# Patient Record
Sex: MALE | Race: WHITE | NOT HISPANIC OR LATINO | URBAN - METROPOLITAN AREA
[De-identification: names, ages, dates, MRNs, and addresses within clinical notes are randomized per-mention and may not be internally consistent; named-entity substitution may affect disease eponyms.]

---

## 2016-07-21 RX ORDER — GABAPENTIN 400 MG/1
3 CAPSULE ORAL
Qty: 0 | Refills: 0 | DISCHARGE
Start: 2016-07-21

## 2016-07-21 RX ORDER — POTASSIUM CHLORIDE 20 MEQ
1 PACKET (EA) ORAL
Qty: 0 | Refills: 0 | DISCHARGE
Start: 2016-07-21

## 2016-07-21 RX ORDER — ASPIRIN/CALCIUM CARB/MAGNESIUM 324 MG
1 TABLET ORAL
Qty: 0 | Refills: 0 | DISCHARGE
Start: 2016-07-21

## 2017-07-24 ENCOUNTER — EMERGENCY (EMERGENCY)
Facility: HOSPITAL | Age: 72
LOS: 1 days | Discharge: HOME | End: 2017-07-24
Attending: EMERGENCY MEDICINE
Payer: MEDICARE

## 2017-07-24 VITALS
TEMPERATURE: 98 F | DIASTOLIC BLOOD PRESSURE: 62 MMHG | SYSTOLIC BLOOD PRESSURE: 133 MMHG | HEART RATE: 42 BPM | OXYGEN SATURATION: 100 % | RESPIRATION RATE: 18 BRPM

## 2017-07-24 VITALS
HEART RATE: 48 BPM | TEMPERATURE: 98 F | HEIGHT: 70 IN | DIASTOLIC BLOOD PRESSURE: 50 MMHG | SYSTOLIC BLOOD PRESSURE: 97 MMHG | RESPIRATION RATE: 16 BRPM | WEIGHT: 184.97 LBS | OXYGEN SATURATION: 100 %

## 2017-07-24 DIAGNOSIS — Z98.89 OTHER SPECIFIED POSTPROCEDURAL STATES: Chronic | ICD-10-CM

## 2017-07-24 DIAGNOSIS — Z96.652 PRESENCE OF LEFT ARTIFICIAL KNEE JOINT: Chronic | ICD-10-CM

## 2017-07-24 DIAGNOSIS — Z95.1 PRESENCE OF AORTOCORONARY BYPASS GRAFT: Chronic | ICD-10-CM

## 2017-07-24 DIAGNOSIS — Z12.11 ENCOUNTER FOR SCREENING FOR MALIGNANT NEOPLASM OF COLON: Chronic | ICD-10-CM

## 2017-07-24 DIAGNOSIS — G56.00 CARPAL TUNNEL SYNDROME, UNSPECIFIED UPPER LIMB: Chronic | ICD-10-CM

## 2017-07-24 PROCEDURE — 71010: CPT | Mod: 26

## 2017-07-24 PROCEDURE — 70486 CT MAXILLOFACIAL W/O DYE: CPT

## 2017-07-24 PROCEDURE — 71045 X-RAY EXAM CHEST 1 VIEW: CPT

## 2017-07-24 PROCEDURE — 99284 EMERGENCY DEPT VISIT MOD MDM: CPT

## 2017-07-24 PROCEDURE — 72125 CT NECK SPINE W/O DYE: CPT | Mod: 26

## 2017-07-24 PROCEDURE — 70450 CT HEAD/BRAIN W/O DYE: CPT | Mod: 26

## 2017-07-24 PROCEDURE — 70450 CT HEAD/BRAIN W/O DYE: CPT

## 2017-07-24 PROCEDURE — 99284 EMERGENCY DEPT VISIT MOD MDM: CPT | Mod: 25

## 2017-07-24 PROCEDURE — 70486 CT MAXILLOFACIAL W/O DYE: CPT | Mod: 26

## 2017-07-24 PROCEDURE — 72125 CT NECK SPINE W/O DYE: CPT

## 2017-07-24 RX ORDER — ACETAMINOPHEN 500 MG
650 TABLET ORAL ONCE
Qty: 0 | Refills: 0 | Status: COMPLETED | OUTPATIENT
Start: 2017-07-24 | End: 2017-07-24

## 2017-07-24 RX ADMIN — Medication 650 MILLIGRAM(S): at 13:51

## 2017-07-24 NOTE — ED PROVIDER NOTE - MEDICAL DECISION MAKING DETAILS
Will give Tylenol for diffuse body soreness. Obtain CT head, cspine and mac face. Will give Tylenol for diffuse body soreness. Obtain CT head, c-spine and max face.

## 2017-07-24 NOTE — ED PROVIDER NOTE - SKIN WOUND TYPE
ABRASION TO THE R KNEE. HEALING WOUND TO B/L LEGS. ECCHYMOSIS TO B/L EXTREMITIES./ABRASION(S)/BRUSING

## 2017-07-24 NOTE — ED ADULT NURSE NOTE - OBJECTIVE STATEMENT
Late entry : Pt states he feel out of bed into a wood floor while reaching out for something , pt had a heard time getting upm and was crawling Late entry : Pt states he feel out of bed into a wood floor while reaching out for something , pt had a hard  time getting up and was crawling on the floor scamping his l side of the cheek , denies LOC " My whole body is soar"

## 2017-07-24 NOTE — ED PROVIDER NOTE - PSH
Carpal tunnel syndrome  B/L  S/P surgery  Encounter for screening colonoscopy    H/O umbilical hernia repair    History of appendectomy    History of knee replacement, total, left    History of tonsillectomy  appendectpmy  S/P CABG x 3    S/P laminectomy  lumbar  Status post arthroscopic knee surgery  left knee x2  Status post cardiac surgery  triple by-pass surgery, cardiac stents x3

## 2017-07-24 NOTE — ED PROVIDER NOTE - OBJECTIVE STATEMENT
71 y/o M pt with a PMHx of anemia, CAD, DM, HDL, and ventral tachycardia BIBA to the ED c/o lacerations/abrasions/bruising s/p fall today. Pt states that he fell onto the floor, out of bed and had trouble getting up. Pt called EMS himself. Her reports overall soreness.Pt sates he takes 1 baby Asprin daily. Denies LOC, n/v/d, dizziness, headache, chest pain, SOB, fever, chills, abdominal pain or any other complaints. NKDA. This patient is a 71 y/o man with a PMHx of anemia, CAD, DM, HDL, and ventral tachycardia BIBA to the ED c/o lacerations/abrasions/bruising s/p fall today. Pt states that he fell onto the floor, out of bed and had trouble getting up. Pt called EMS himself. He reports overall soreness to areas where he susptained abrasions.  Pt sates he takes 1 baby Asprin daily. Denies LOC, n/v/d, dizziness, headache, chest pain, SOB, and abdominal pain.

## 2017-07-24 NOTE — ED PROVIDER NOTE - PMH
Anemia    Back pain  s/p lumbar lami  CAD (coronary artery disease)  with stent placement  Carpal tunnel syndrome, bilateral  s/p surgery  Diabetes    DJD (degenerative joint disease)    High cholesterol    Reactive airway disease    Sleep apnea  Intolerant of CPAP  Umbilical hernia    Ventricular tachycardia

## 2017-12-06 ENCOUNTER — OUTPATIENT (OUTPATIENT)
Dept: OUTPATIENT SERVICES | Facility: HOSPITAL | Age: 72
LOS: 1 days | End: 2017-12-06
Payer: MEDICARE

## 2017-12-06 ENCOUNTER — APPOINTMENT (OUTPATIENT)
Dept: RADIOLOGY | Facility: CLINIC | Age: 72
End: 2017-12-06

## 2017-12-06 DIAGNOSIS — Z96.652 PRESENCE OF LEFT ARTIFICIAL KNEE JOINT: Chronic | ICD-10-CM

## 2017-12-06 DIAGNOSIS — Z98.89 OTHER SPECIFIED POSTPROCEDURAL STATES: Chronic | ICD-10-CM

## 2017-12-06 DIAGNOSIS — G56.00 CARPAL TUNNEL SYNDROME, UNSPECIFIED UPPER LIMB: Chronic | ICD-10-CM

## 2017-12-06 DIAGNOSIS — Z95.1 PRESENCE OF AORTOCORONARY BYPASS GRAFT: Chronic | ICD-10-CM

## 2017-12-06 DIAGNOSIS — Z00.8 ENCOUNTER FOR OTHER GENERAL EXAMINATION: ICD-10-CM

## 2017-12-06 DIAGNOSIS — Z12.11 ENCOUNTER FOR SCREENING FOR MALIGNANT NEOPLASM OF COLON: Chronic | ICD-10-CM

## 2017-12-06 PROCEDURE — 72110 X-RAY EXAM L-2 SPINE 4/>VWS: CPT

## 2017-12-06 PROCEDURE — 73502 X-RAY EXAM HIP UNI 2-3 VIEWS: CPT | Mod: 26,RT

## 2017-12-06 PROCEDURE — 72110 X-RAY EXAM L-2 SPINE 4/>VWS: CPT | Mod: 26

## 2017-12-06 PROCEDURE — 73502 X-RAY EXAM HIP UNI 2-3 VIEWS: CPT

## 2018-01-15 ENCOUNTER — INPATIENT (INPATIENT)
Facility: HOSPITAL | Age: 73
LOS: 3 days | Discharge: NURSING, REHAB OR LONG-TERM CARE | DRG: 552 | End: 2018-01-19
Attending: INTERNAL MEDICINE | Admitting: INTERNAL MEDICINE
Payer: MEDICARE

## 2018-01-15 VITALS
TEMPERATURE: 98 F | WEIGHT: 205.03 LBS | RESPIRATION RATE: 16 BRPM | DIASTOLIC BLOOD PRESSURE: 73 MMHG | OXYGEN SATURATION: 99 % | HEART RATE: 60 BPM | SYSTOLIC BLOOD PRESSURE: 138 MMHG | HEIGHT: 72 IN

## 2018-01-15 DIAGNOSIS — Z12.11 ENCOUNTER FOR SCREENING FOR MALIGNANT NEOPLASM OF COLON: Chronic | ICD-10-CM

## 2018-01-15 DIAGNOSIS — Z98.89 OTHER SPECIFIED POSTPROCEDURAL STATES: Chronic | ICD-10-CM

## 2018-01-15 DIAGNOSIS — Z95.1 PRESENCE OF AORTOCORONARY BYPASS GRAFT: Chronic | ICD-10-CM

## 2018-01-15 DIAGNOSIS — G56.00 CARPAL TUNNEL SYNDROME, UNSPECIFIED UPPER LIMB: Chronic | ICD-10-CM

## 2018-01-15 DIAGNOSIS — Z96.652 PRESENCE OF LEFT ARTIFICIAL KNEE JOINT: Chronic | ICD-10-CM

## 2018-01-15 LAB
ALBUMIN SERPL ELPH-MCNC: 4.2 G/DL — SIGNIFICANT CHANGE UP (ref 3.3–5.2)
ALP SERPL-CCNC: 91 U/L — SIGNIFICANT CHANGE UP (ref 40–120)
ALT FLD-CCNC: 21 U/L — SIGNIFICANT CHANGE UP
ANION GAP SERPL CALC-SCNC: 15 MMOL/L — SIGNIFICANT CHANGE UP (ref 5–17)
APPEARANCE UR: CLEAR — SIGNIFICANT CHANGE UP
AST SERPL-CCNC: 29 U/L — SIGNIFICANT CHANGE UP
BACTERIA # UR AUTO: ABNORMAL
BASOPHILS # BLD AUTO: 0 K/UL — SIGNIFICANT CHANGE UP (ref 0–0.2)
BASOPHILS NFR BLD AUTO: 0.3 % — SIGNIFICANT CHANGE UP (ref 0–2)
BILIRUB SERPL-MCNC: 0.9 MG/DL — SIGNIFICANT CHANGE UP (ref 0.4–2)
BILIRUB UR-MCNC: NEGATIVE — SIGNIFICANT CHANGE UP
BUN SERPL-MCNC: 31 MG/DL — HIGH (ref 8–20)
CALCIUM SERPL-MCNC: 9.4 MG/DL — SIGNIFICANT CHANGE UP (ref 8.6–10.2)
CHLORIDE SERPL-SCNC: 100 MMOL/L — SIGNIFICANT CHANGE UP (ref 98–107)
CO2 SERPL-SCNC: 23 MMOL/L — SIGNIFICANT CHANGE UP (ref 22–29)
COLOR SPEC: YELLOW — SIGNIFICANT CHANGE UP
CREAT SERPL-MCNC: 0.81 MG/DL — SIGNIFICANT CHANGE UP (ref 0.5–1.3)
DIFF PNL FLD: ABNORMAL
EOSINOPHIL # BLD AUTO: 0.1 K/UL — SIGNIFICANT CHANGE UP (ref 0–0.5)
EOSINOPHIL NFR BLD AUTO: 1.2 % — SIGNIFICANT CHANGE UP (ref 0–5)
EPI CELLS # UR: SIGNIFICANT CHANGE UP
GLUCOSE SERPL-MCNC: 110 MG/DL — SIGNIFICANT CHANGE UP (ref 70–115)
GLUCOSE UR QL: NEGATIVE MG/DL — SIGNIFICANT CHANGE UP
HCT VFR BLD CALC: 34.2 % — LOW (ref 42–52)
HGB BLD-MCNC: 11.5 G/DL — LOW (ref 14–18)
KETONES UR-MCNC: NEGATIVE — SIGNIFICANT CHANGE UP
LEUKOCYTE ESTERASE UR-ACNC: NEGATIVE — SIGNIFICANT CHANGE UP
LYMPHOCYTES # BLD AUTO: 1.2 K/UL — SIGNIFICANT CHANGE UP (ref 1–4.8)
LYMPHOCYTES # BLD AUTO: 17.7 % — LOW (ref 20–55)
MCHC RBC-ENTMCNC: 31 PG — SIGNIFICANT CHANGE UP (ref 27–31)
MCHC RBC-ENTMCNC: 33.6 G/DL — SIGNIFICANT CHANGE UP (ref 32–36)
MCV RBC AUTO: 92.2 FL — SIGNIFICANT CHANGE UP (ref 80–94)
MONOCYTES # BLD AUTO: 0.6 K/UL — SIGNIFICANT CHANGE UP (ref 0–0.8)
MONOCYTES NFR BLD AUTO: 9.5 % — SIGNIFICANT CHANGE UP (ref 3–10)
NEUTROPHILS # BLD AUTO: 4.8 K/UL — SIGNIFICANT CHANGE UP (ref 1.8–8)
NEUTROPHILS NFR BLD AUTO: 71.2 % — SIGNIFICANT CHANGE UP (ref 37–73)
NITRITE UR-MCNC: NEGATIVE — SIGNIFICANT CHANGE UP
PH UR: 7 — SIGNIFICANT CHANGE UP (ref 5–8)
PLATELET # BLD AUTO: 201 K/UL — SIGNIFICANT CHANGE UP (ref 150–400)
POTASSIUM SERPL-MCNC: 4.6 MMOL/L — SIGNIFICANT CHANGE UP (ref 3.5–5.3)
POTASSIUM SERPL-SCNC: 4.6 MMOL/L — SIGNIFICANT CHANGE UP (ref 3.5–5.3)
PROT SERPL-MCNC: 7.3 G/DL — SIGNIFICANT CHANGE UP (ref 6.6–8.7)
PROT UR-MCNC: 15 MG/DL
RBC # BLD: 3.71 M/UL — LOW (ref 4.6–6.2)
RBC # FLD: 13.3 % — SIGNIFICANT CHANGE UP (ref 11–15.6)
RBC CASTS # UR COMP ASSIST: ABNORMAL /HPF (ref 0–4)
SODIUM SERPL-SCNC: 138 MMOL/L — SIGNIFICANT CHANGE UP (ref 135–145)
SP GR SPEC: 1.01 — SIGNIFICANT CHANGE UP (ref 1.01–1.02)
UROBILINOGEN FLD QL: NEGATIVE MG/DL — SIGNIFICANT CHANGE UP
WBC # BLD: 6.7 K/UL — SIGNIFICANT CHANGE UP (ref 4.8–10.8)
WBC # FLD AUTO: 6.7 K/UL — SIGNIFICANT CHANGE UP (ref 4.8–10.8)
WBC UR QL: SIGNIFICANT CHANGE UP

## 2018-01-15 PROCEDURE — 93010 ELECTROCARDIOGRAM REPORT: CPT

## 2018-01-15 PROCEDURE — 99285 EMERGENCY DEPT VISIT HI MDM: CPT

## 2018-01-15 PROCEDURE — 72131 CT LUMBAR SPINE W/O DYE: CPT | Mod: 26

## 2018-01-15 PROCEDURE — 74177 CT ABD & PELVIS W/CONTRAST: CPT | Mod: 26

## 2018-01-15 RX ORDER — CYCLOBENZAPRINE HYDROCHLORIDE 10 MG/1
10 TABLET, FILM COATED ORAL ONCE
Qty: 0 | Refills: 0 | Status: COMPLETED | OUTPATIENT
Start: 2018-01-15 | End: 2018-01-15

## 2018-01-15 RX ORDER — KETOROLAC TROMETHAMINE 30 MG/ML
15 SYRINGE (ML) INJECTION ONCE
Qty: 0 | Refills: 0 | Status: DISCONTINUED | OUTPATIENT
Start: 2018-01-15 | End: 2018-01-15

## 2018-01-15 RX ADMIN — Medication 15 MILLIGRAM(S): at 08:51

## 2018-01-15 RX ADMIN — Medication 1 MILLIGRAM(S): at 21:00

## 2018-01-15 RX ADMIN — CYCLOBENZAPRINE HYDROCHLORIDE 10 MILLIGRAM(S): 10 TABLET, FILM COATED ORAL at 08:51

## 2018-01-15 RX ADMIN — Medication 50 MILLIGRAM(S): at 08:51

## 2018-01-15 NOTE — ED PROVIDER NOTE - CHPI ED SYMPTOMS NEG
no motor function loss/no bowel dysfunction/no bladder dysfunction/no constipation/no numbness/no tingling

## 2018-01-15 NOTE — ED PROVIDER NOTE - SHIFT CHANGE DETAILS
RECEIVED IN SIGN OUT  ELDERLY MALE LIVES ALONE, SEVERE SPINAL STENOSIS ON MEDS  CAN BARELY WALK  NEEDS PT EVAL, SW

## 2018-01-15 NOTE — ED ADULT TRIAGE NOTE - CHIEF COMPLAINT QUOTE
pt comes to ed reporting bilateral lower extremity decreased sensation; states unable to work this morning when he woke up. reports hx "partial paralysis" from spinal injury but worse today.

## 2018-01-15 NOTE — ED PROVIDER NOTE - NS ED ROS FT
Denies fever, chills, HA, dizziness, blurry vision, sore throat, coughing, SOB, CP, nausea, vomiting, abdominal pain, flank pain, diarrhea, constipation, blood in stool, urinary frequency/urgency/dysuria, hematuria, LE edema, or rashes.

## 2018-01-15 NOTE — ED PROVIDER NOTE - OBJECTIVE STATEMENT
Patient with PMH "partial paralysis" of b/l legs presents complaining of worsening low back and LE pain/weakness. He states that last night his legs felt like "100 lbs". He notes that two weeks ago he started taking a muscle relaxer Rx'd by his PMD for worsening back and leg pain, which is Rx'd "as needed", but he has been needing it daily. He otherwise has had no changes to his medications. He denies any fevers, any trauma, and fecal or urinary incontinence. He states he has otherwise been in his usual state of good health.    He is a poor historian otherwise.

## 2018-01-15 NOTE — ED ADULT NURSE NOTE - OBJECTIVE STATEMENT
Patient arrived to the ED from home, patient states that he has a hx of partial paralysis in his legs for the last few years from a "bad spine". Patient states that he has had a laminectomy in the past but still has difficulty with his legs. Patient states that last night he started to have increased weakness in his legs and states that he was unable to walk. Patient states that he is still having some weakness. He denies any pain. NAD noted. Patient denies any changes in bowel or bladder function

## 2018-01-15 NOTE — ED PROVIDER NOTE - MEDICAL DECISION MAKING DETAILS
Patient presents complaining of worsening bilateral LE pain and weakness. No weakness appreciated on exam, sensation at baseline. No signs of trauma. Patient does have some abdominal tenderness to palpation suprapubically. Will CT A/P and reconstruct L/S, treat with steroids, toradol and flexeril for back pain and reassess.

## 2018-01-15 NOTE — ED PROVIDER NOTE - LOCATION
back Full ROM of left hip, left knee, left ankle with 5/5 strength. Left TKR. Full ROM of the right hip, knee and ankle with 5/5 strength.

## 2018-01-15 NOTE — CHART NOTE - NSCHARTNOTEFT_GEN_A_CORE
AMILCAR Note - pt referred to SW for d/c planning. pt lives alone, utilizes first floor (has one step then landing then step into home - pt stays on first floor - laundry is in basement 12 steps down) pt has family local and caregiver Anahi. pt states he can not walk recently and the cane isn't helping - according to EMR pt has all home DME RW, [employer], Cane, Grab bars etc. MD ordered PT eval but it has not been completed today - pt to stay in ED for PT eval and safe d/c planning. pt has said he is agreeable to [facility] if that is recommendation.   pt was here at [facility] in Nov 2016 and did go to [facility] (previously was in [facility] and will not return there) SW to follow for safe planning after PT eval.

## 2018-01-15 NOTE — ED PROVIDER NOTE - CARE PLAN
Principal Discharge DX:	Chronic bilateral low back pain with bilateral sciatica  Secondary Diagnosis:	Constipation, unspecified constipation type Principal Discharge DX:	Inability to ambulate due to multiple joints  Secondary Diagnosis:	Constipation, unspecified constipation type  Secondary Diagnosis:	Functional disease present

## 2018-01-15 NOTE — ED PROVIDER NOTE - PROGRESS NOTE DETAILS
Chemistry not resulted. I called lab who notes that they have no power and that only one machine is working and there is a host communication error and no results are crossing over. The patient's chemistry is currently running. Multiple attempts by myself and nurse to get PT to see the patient prior to 18:00, without avail. I cannot send the patient home as he is unable to ambulate without assistance and he only uses a cane at home and lives alone. I contacted  [facility] for assistance. Patient signed out to Dr. Presley pending PT to see in the morning. PT IN NAD, HAVING BREAKFAST. COMFORTABLE

## 2018-01-15 NOTE — ED PROVIDER NOTE - PRINCIPAL DIAGNOSIS
Chronic bilateral low back pain with bilateral sciatica Inability to ambulate due to multiple joints

## 2018-01-16 DIAGNOSIS — M48.061 SPINAL STENOSIS, LUMBAR REGION WITHOUT NEUROGENIC CLAUDICATION: ICD-10-CM

## 2018-01-16 DIAGNOSIS — I25.10 ATHEROSCLEROTIC HEART DISEASE OF NATIVE CORONARY ARTERY WITHOUT ANGINA PECTORIS: ICD-10-CM

## 2018-01-16 DIAGNOSIS — R26.2 DIFFICULTY IN WALKING, NOT ELSEWHERE CLASSIFIED: ICD-10-CM

## 2018-01-16 DIAGNOSIS — E11.9 TYPE 2 DIABETES MELLITUS WITHOUT COMPLICATIONS: ICD-10-CM

## 2018-01-16 LAB
GLUCOSE BLDC GLUCOMTR-MCNC: 103 MG/DL — HIGH (ref 70–99)
GLUCOSE BLDC GLUCOMTR-MCNC: 164 MG/DL — HIGH (ref 70–99)

## 2018-01-16 PROCEDURE — 99223 1ST HOSP IP/OBS HIGH 75: CPT | Mod: AI

## 2018-01-16 RX ORDER — BUMETANIDE 0.25 MG/ML
1 INJECTION INTRAMUSCULAR; INTRAVENOUS DAILY
Qty: 0 | Refills: 0 | Status: DISCONTINUED | OUTPATIENT
Start: 2018-01-16 | End: 2018-01-19

## 2018-01-16 RX ORDER — ASPIRIN/CALCIUM CARB/MAGNESIUM 324 MG
81 TABLET ORAL DAILY
Qty: 0 | Refills: 0 | Status: DISCONTINUED | OUTPATIENT
Start: 2018-01-16 | End: 2018-01-19

## 2018-01-16 RX ORDER — OXYCODONE AND ACETAMINOPHEN 5; 325 MG/1; MG/1
1 TABLET ORAL EVERY 6 HOURS
Qty: 0 | Refills: 0 | Status: DISCONTINUED | OUTPATIENT
Start: 2018-01-16 | End: 2018-01-19

## 2018-01-16 RX ORDER — MORPHINE SULFATE 50 MG/1
2 CAPSULE, EXTENDED RELEASE ORAL EVERY 6 HOURS
Qty: 0 | Refills: 0 | Status: DISCONTINUED | OUTPATIENT
Start: 2018-01-16 | End: 2018-01-19

## 2018-01-16 RX ORDER — SODIUM CHLORIDE 9 MG/ML
1000 INJECTION, SOLUTION INTRAVENOUS
Qty: 0 | Refills: 0 | Status: DISCONTINUED | OUTPATIENT
Start: 2018-01-16 | End: 2018-01-19

## 2018-01-16 RX ORDER — DEXTROSE 50 % IN WATER 50 %
1 SYRINGE (ML) INTRAVENOUS ONCE
Qty: 0 | Refills: 0 | Status: DISCONTINUED | OUTPATIENT
Start: 2018-01-16 | End: 2018-01-19

## 2018-01-16 RX ORDER — ATORVASTATIN CALCIUM 80 MG/1
40 TABLET, FILM COATED ORAL AT BEDTIME
Qty: 0 | Refills: 0 | Status: DISCONTINUED | OUTPATIENT
Start: 2018-01-16 | End: 2018-01-19

## 2018-01-16 RX ORDER — ENOXAPARIN SODIUM 100 MG/ML
40 INJECTION SUBCUTANEOUS EVERY 24 HOURS
Qty: 0 | Refills: 0 | Status: DISCONTINUED | OUTPATIENT
Start: 2018-01-16 | End: 2018-01-19

## 2018-01-16 RX ORDER — DEXTROSE 50 % IN WATER 50 %
12.5 SYRINGE (ML) INTRAVENOUS ONCE
Qty: 0 | Refills: 0 | Status: DISCONTINUED | OUTPATIENT
Start: 2018-01-16 | End: 2018-01-19

## 2018-01-16 RX ORDER — GABAPENTIN 400 MG/1
300 CAPSULE ORAL AT BEDTIME
Qty: 0 | Refills: 0 | Status: DISCONTINUED | OUTPATIENT
Start: 2018-01-16 | End: 2018-01-19

## 2018-01-16 RX ORDER — DEXTROSE 50 % IN WATER 50 %
25 SYRINGE (ML) INTRAVENOUS ONCE
Qty: 0 | Refills: 0 | Status: DISCONTINUED | OUTPATIENT
Start: 2018-01-16 | End: 2018-01-19

## 2018-01-16 RX ORDER — GLUCAGON INJECTION, SOLUTION 0.5 MG/.1ML
1 INJECTION, SOLUTION SUBCUTANEOUS ONCE
Qty: 0 | Refills: 0 | Status: DISCONTINUED | OUTPATIENT
Start: 2018-01-16 | End: 2018-01-19

## 2018-01-16 RX ORDER — INSULIN LISPRO 100/ML
VIAL (ML) SUBCUTANEOUS
Qty: 0 | Refills: 0 | Status: DISCONTINUED | OUTPATIENT
Start: 2018-01-16 | End: 2018-01-19

## 2018-01-16 RX ORDER — MAGNESIUM OXIDE 400 MG ORAL TABLET 241.3 MG
400 TABLET ORAL DAILY
Qty: 0 | Refills: 0 | Status: DISCONTINUED | OUTPATIENT
Start: 2018-01-16 | End: 2018-01-19

## 2018-01-16 RX ORDER — MOMETASONE FUROATE 220 UG/1
1 INHALANT RESPIRATORY (INHALATION)
Qty: 0 | Refills: 0 | Status: DISCONTINUED | OUTPATIENT
Start: 2018-01-16 | End: 2018-01-19

## 2018-01-16 RX ORDER — GABAPENTIN 400 MG/1
300 CAPSULE ORAL THREE TIMES A DAY
Qty: 0 | Refills: 0 | Status: DISCONTINUED | OUTPATIENT
Start: 2018-01-16 | End: 2018-01-19

## 2018-01-16 RX ORDER — PANTOPRAZOLE SODIUM 20 MG/1
40 TABLET, DELAYED RELEASE ORAL
Qty: 0 | Refills: 0 | Status: DISCONTINUED | OUTPATIENT
Start: 2018-01-16 | End: 2018-01-19

## 2018-01-16 RX ORDER — ATENOLOL 25 MG/1
50 TABLET ORAL DAILY
Qty: 0 | Refills: 0 | Status: DISCONTINUED | OUTPATIENT
Start: 2018-01-16 | End: 2018-01-19

## 2018-01-16 RX ORDER — POTASSIUM CHLORIDE 20 MEQ
10 PACKET (EA) ORAL DAILY
Qty: 0 | Refills: 0 | Status: DISCONTINUED | OUTPATIENT
Start: 2018-01-16 | End: 2018-01-19

## 2018-01-16 RX ADMIN — ATORVASTATIN CALCIUM 40 MILLIGRAM(S): 80 TABLET, FILM COATED ORAL at 21:10

## 2018-01-16 RX ADMIN — OXYCODONE AND ACETAMINOPHEN 1 TABLET(S): 5; 325 TABLET ORAL at 17:37

## 2018-01-16 RX ADMIN — GABAPENTIN 300 MILLIGRAM(S): 400 CAPSULE ORAL at 21:11

## 2018-01-16 RX ADMIN — Medication 2: at 17:22

## 2018-01-16 RX ADMIN — OXYCODONE AND ACETAMINOPHEN 1 TABLET(S): 5; 325 TABLET ORAL at 17:22

## 2018-01-16 RX ADMIN — MOMETASONE FUROATE 1 PUFF(S): 220 INHALANT RESPIRATORY (INHALATION) at 20:00

## 2018-01-16 NOTE — PHYSICAL THERAPY INITIAL EVALUATION ADULT - ADDITIONAL COMMENTS
Pt lives alone in a 2 story house with 3 steps to enter on the 1st floor.  Reports amb with SAC and Modified Independent with ADLs and self care.  Has a HHA 3 x per week for cooking, shopping and laundry

## 2018-01-16 NOTE — H&P ADULT - HISTORY OF PRESENT ILLNESS
71 yo M w/ hx CABG/ PCI, DM2, Lumbar laminectomy with resulting partial paralysis from waist down, Cervical spine surgery presents with progressive weakness in LE along with back pain and unsteady gait with cane.    no new saddle paresthesia, urinary/bowel incontinence or LE weakness.  all findings at baseline per patient.    in ER, seen by physical therapy and Subacute rehab advised with patient agreeable as unsafe discharge.

## 2018-01-16 NOTE — CHART NOTE - NSCHARTNOTEFT_GEN_A_CORE
SOCIAL WORK NOTE:  PATIENT HELD OVERNIGHT FOR PT AND SW IN THE [facility]. CONSULTED WITH PT AND PATIENT IS IN DEFINATE NEED OF REHAB.  MET WITH PATIENT WHOM IS ALERT AND ORIENTED. PLEASANT AND COOPERATIVE.  HAD A LONG CONVERSATION WITH PATIENT REGARDING HIS OPTIONS FOR  DC PLANNING.  PATIENT DOES NOT REALLY HAVE ANY FAMILY THAT CAN ASSIST AND WOULD LIKE [facility].  MADE PATIENT AWARE OF THE 3 NIGHT STAY RULE AND THAT HE WOULDN'T BE PREPARED TO LEAVE UNTIL FRIDAY MORNING INTO A [facility]. ALSO MADE THE PATIENT AWARE THAT HIS 3 NIGHT STAY IS A NON-QUALIFYING STAY AND MAY B E AT RISK TO GETTING A BILL FOR THE [facility] DOWN THE LINE.  PATIENT EXPRESSED UNDERSTANDING AND IS VERY AFRAID TO AMBULATE WITH CANE AT THIS TIME AND WILL TAKE [facility].  PREFERENCE IS FOR [facility] ([facility]). DR MARQUEZ AWARE AND CCC AS WELL.  WILL COMPLETE PSYCHOSOCIAL ONCE PATINT ADMITTED

## 2018-01-16 NOTE — H&P ADULT - ASSESSMENT
71 yo M w/ hx CABG/ PCI, DM2, Lumbar laminectomy with resulting partial paralysis from waist down, Cervical spine surgery presents with progressive weakness in LE along with back pain and unsteady gait with cane.

## 2018-01-16 NOTE — H&P ADULT - FAMILY HISTORY
Family history of early CAD     Family history of CHF (congestive heart failure)     Family history of dementia     Family history of prostate cancer     Sibling  Still living? Yes, Estimated age: Age Unknown  Family history of diabetes mellitus, Age at diagnosis: Age Unknown

## 2018-01-16 NOTE — PHYSICAL THERAPY INITIAL EVALUATION ADULT - GENERAL OBSERVATIONS, REHAB EVAL
Pt found lying in bed with c/o bilat LE weakness and numbness, pleasant and engaging and agreeable to PT

## 2018-01-16 NOTE — H&P ADULT - EXTREMITIES COMMENTS
chronic LE venous insufficiency skin changes  small ulceration right heel without surrounding erythema or fluctuance noted

## 2018-01-16 NOTE — ED ADULT NURSE REASSESSMENT NOTE - NS ED NURSE REASSESS COMMENT FT1
aware   pt to be admitted for 3 days stay...Dr Bangura aware
6 tower to call right back for report
PT at bedside  pt states ambulates at home with cane/rw   breakfast given
called 6 tower to give report.  pt with  ?ulcer looking small circular area noted to bottom left heel  'its been there awhile'  'it doesn't really hurt'  no drainage noted
report given to Enedina DEAN     bandaids intact to BUE  'please leave them there'  'I need them just like they are'   gauze applied with kait to left foot.  waiting transport
Contact Info: Pina Kwong (caregiver) [phone]. Please call her with any information updates.
Patient continues to rest in bed at this time, patient is waiting to be seen by PT. Several attempts made to contact PT, but no answer.
Patient continues to rest in bed at this time, patient taken to CT. NAD noted. Updated on progress.
Pt is resting in bed comfortably at this time, no apparent distress noted at this time. pt safety maintained. Pt denies any complaints at this time. Plan of care explained, will continue to monitor.
assumed care of pt   pt resting comfortably on stretcher,  multiple bandaids to BUE  'I have a lot of skin tears/issues"      IVSL 20g RFA +flush +blood return site clean/dry    [place]=strength    waiting for PT/SW
at 2100, pt had an episode of shortness of breath and was shaking his arms saying "he is having convulsions." MD Presley called to bedside to evaluate pt. Pt was placed on 2L Nc. RN instructed deep breathing exercises. stat ekg was performed at this time and pt was medicated as per orders. pt states he has h/o anxiety. pt states he feels better at this time. will continue to monitor.
pt care assumed at 1930, no apparent distress noted at this time, charting as noted. pt received Alert and Oriented to person, place, situation and time laying in bed comfortably. pt c/o of the noisy environment. door was closed at this time and ear plugs were provided. pt is awaiting PT in the morning. plan of care explained, will continue to monitor.

## 2018-01-16 NOTE — PROVIDER CONTACT NOTE (OTHER) - ASSESSMENT
Pt with no c/o pain before, during or after RX.  Pt will benefit from PT to maximize functional independence.  Will continue to follow.  Pt left supine in bed in no apparent distress and call bell within reach. Nurse aware.

## 2018-01-17 LAB
GLUCOSE BLDC GLUCOMTR-MCNC: 116 MG/DL — HIGH (ref 70–99)
GLUCOSE BLDC GLUCOMTR-MCNC: 154 MG/DL — HIGH (ref 70–99)
GLUCOSE BLDC GLUCOMTR-MCNC: 168 MG/DL — HIGH (ref 70–99)
GLUCOSE BLDC GLUCOMTR-MCNC: 86 MG/DL — SIGNIFICANT CHANGE UP (ref 70–99)
HBA1C BLD-MCNC: 6.2 % — HIGH (ref 4–5.6)

## 2018-01-17 PROCEDURE — 99233 SBSQ HOSP IP/OBS HIGH 50: CPT

## 2018-01-17 RX ORDER — LANOLIN ALCOHOL/MO/W.PET/CERES
5 CREAM (GRAM) TOPICAL ONCE
Qty: 0 | Refills: 0 | Status: COMPLETED | OUTPATIENT
Start: 2018-01-17 | End: 2018-01-17

## 2018-01-17 RX ADMIN — Medication 2: at 11:49

## 2018-01-17 RX ADMIN — PANTOPRAZOLE SODIUM 40 MILLIGRAM(S): 20 TABLET, DELAYED RELEASE ORAL at 05:22

## 2018-01-17 RX ADMIN — ATORVASTATIN CALCIUM 40 MILLIGRAM(S): 80 TABLET, FILM COATED ORAL at 21:33

## 2018-01-17 RX ADMIN — Medication 10 MILLIEQUIVALENT(S): at 11:50

## 2018-01-17 RX ADMIN — BUMETANIDE 1 MILLIGRAM(S): 0.25 INJECTION INTRAMUSCULAR; INTRAVENOUS at 05:21

## 2018-01-17 RX ADMIN — ENOXAPARIN SODIUM 40 MILLIGRAM(S): 100 INJECTION SUBCUTANEOUS at 05:22

## 2018-01-17 RX ADMIN — OXYCODONE AND ACETAMINOPHEN 1 TABLET(S): 5; 325 TABLET ORAL at 00:48

## 2018-01-17 RX ADMIN — MOMETASONE FUROATE 1 PUFF(S): 220 INHALANT RESPIRATORY (INHALATION) at 05:21

## 2018-01-17 RX ADMIN — GABAPENTIN 300 MILLIGRAM(S): 400 CAPSULE ORAL at 21:34

## 2018-01-17 RX ADMIN — MAGNESIUM OXIDE 400 MG ORAL TABLET 400 MILLIGRAM(S): 241.3 TABLET ORAL at 11:50

## 2018-01-17 RX ADMIN — OXYCODONE AND ACETAMINOPHEN 1 TABLET(S): 5; 325 TABLET ORAL at 22:30

## 2018-01-17 RX ADMIN — ATENOLOL 50 MILLIGRAM(S): 25 TABLET ORAL at 05:21

## 2018-01-17 RX ADMIN — OXYCODONE AND ACETAMINOPHEN 1 TABLET(S): 5; 325 TABLET ORAL at 01:22

## 2018-01-17 RX ADMIN — OXYCODONE AND ACETAMINOPHEN 1 TABLET(S): 5; 325 TABLET ORAL at 21:32

## 2018-01-17 RX ADMIN — Medication 81 MILLIGRAM(S): at 11:50

## 2018-01-17 RX ADMIN — Medication 2: at 17:05

## 2018-01-17 NOTE — PROGRESS NOTE ADULT - SUBJECTIVE AND OBJECTIVE BOX
seen for weakness    chronic LE weakness   pain ok  ros negative otherwise     MEDICATIONS  (STANDING):  aspirin  chewable 81 milliGRAM(s) Oral daily  ATENolol  Tablet 50 milliGRAM(s) Oral daily  atorvastatin 40 milliGRAM(s) Oral at bedtime  buMETAnide 1 milliGRAM(s) Oral daily  dextrose 5%. 1000 milliLiter(s) (50 mL/Hr) IV Continuous <Continuous>  dextrose 50% Injectable 12.5 Gram(s) IV Push once  dextrose 50% Injectable 25 Gram(s) IV Push once  dextrose 50% Injectable 25 Gram(s) IV Push once  enoxaparin Injectable 40 milliGRAM(s) SubCutaneous every 24 hours  gabapentin 300 milliGRAM(s) Oral at bedtime  insulin lispro (HumaLOG) corrective regimen sliding scale   SubCutaneous three times a day before meals  magnesium oxide 400 milliGRAM(s) Oral daily  mometasone 220 MICROgram(s) Inhaler 1 Puff(s) Inhalation two times a day  pantoprazole    Tablet 40 milliGRAM(s) Oral before breakfast  potassium chloride    Tablet ER 10 milliEquivalent(s) Oral daily    MEDICATIONS  (PRN):  dextrose Gel 1 Dose(s) Oral once PRN Blood Glucose LESS THAN 70 milliGRAM(s)/deciliter  gabapentin 300 milliGRAM(s) Oral three times a day PRN moderate pain  glucagon  Injectable 1 milliGRAM(s) IntraMuscular once PRN Glucose LESS THAN 70 milligrams/deciliter  morphine  - Injectable 2 milliGRAM(s) IV Push every 6 hours PRN breakthrough pain  oxyCODONE    5 mG/acetaminophen 325 mG 1 Tablet(s) Oral every 6 hours PRN Severe Pain (7 - 10)      Allergies    penicillin (Other)    Vital Signs Last 24 Hrs  T(C): 36.6 (17 Jan 2018 10:44), Max: 36.8 (17 Jan 2018 00:10)  T(F): 97.8 (17 Jan 2018 10:44), Max: 98.2 (17 Jan 2018 00:10)  HR: 78 (17 Jan 2018 10:44) (67 - 82)  BP: 109/63 (17 Jan 2018 10:44) (108/50 - 136/67)  BP(mean): --  RR: 18 (17 Jan 2018 10:44) (18 - 19)  SpO2: 98% (17 Jan 2018 10:44) (98% - 98%)    PHYSICAL EXAM:    GENERAL: NAD  CHEST/LUNG: Clear to percussion bilaterally  HEART: Regular rate and rhythm; S1 S2  ABDOMEN: Soft, Nontender, Nondistended; Bowel sounds present  EXTREMITIES: no edema.  NERVOUS SYSTEM:  Alert & Oriented X3, gen weakness, non focal   LABS:                CAPILLARY BLOOD GLUCOSE      POCT Blood Glucose.: 154 mg/dL (17 Jan 2018 11:44)  POCT Blood Glucose.: 86 mg/dL (17 Jan 2018 08:43)  POCT Blood Glucose.: 103 mg/dL (16 Jan 2018 21:10)  POCT Blood Glucose.: 164 mg/dL (16 Jan 2018 17:08)        RADIOLOGY & ADDITIONAL TESTS:

## 2018-01-18 LAB
ANION GAP SERPL CALC-SCNC: 14 MMOL/L — SIGNIFICANT CHANGE UP (ref 5–17)
BUN SERPL-MCNC: 50 MG/DL — HIGH (ref 8–20)
CALCIUM SERPL-MCNC: 8.9 MG/DL — SIGNIFICANT CHANGE UP (ref 8.6–10.2)
CHLORIDE SERPL-SCNC: 96 MMOL/L — LOW (ref 98–107)
CO2 SERPL-SCNC: 28 MMOL/L — SIGNIFICANT CHANGE UP (ref 22–29)
CREAT SERPL-MCNC: 1.33 MG/DL — HIGH (ref 0.5–1.3)
CRP SERPL-MCNC: <0.4 MG/DL — SIGNIFICANT CHANGE UP (ref 0–0.4)
ERYTHROCYTE [SEDIMENTATION RATE] IN BLOOD: 46 MM/HR — HIGH (ref 0–20)
GLUCOSE BLDC GLUCOMTR-MCNC: 105 MG/DL — HIGH (ref 70–99)
GLUCOSE BLDC GLUCOMTR-MCNC: 112 MG/DL — HIGH (ref 70–99)
GLUCOSE BLDC GLUCOMTR-MCNC: 113 MG/DL — HIGH (ref 70–99)
GLUCOSE BLDC GLUCOMTR-MCNC: 195 MG/DL — HIGH (ref 70–99)
GLUCOSE SERPL-MCNC: 182 MG/DL — HIGH (ref 70–115)
HCT VFR BLD CALC: 34.3 % — LOW (ref 42–52)
HGB BLD-MCNC: 11.6 G/DL — LOW (ref 14–18)
LACTATE BLDV-MCNC: 1.3 MMOL/L — SIGNIFICANT CHANGE UP (ref 0.5–2)
MCHC RBC-ENTMCNC: 31.2 PG — HIGH (ref 27–31)
MCHC RBC-ENTMCNC: 33.8 G/DL — SIGNIFICANT CHANGE UP (ref 32–36)
MCV RBC AUTO: 92.2 FL — SIGNIFICANT CHANGE UP (ref 80–94)
PLATELET # BLD AUTO: 207 K/UL — SIGNIFICANT CHANGE UP (ref 150–400)
POTASSIUM SERPL-MCNC: 4.6 MMOL/L — SIGNIFICANT CHANGE UP (ref 3.5–5.3)
POTASSIUM SERPL-SCNC: 4.6 MMOL/L — SIGNIFICANT CHANGE UP (ref 3.5–5.3)
RBC # BLD: 3.72 M/UL — LOW (ref 4.6–6.2)
RBC # FLD: 13.4 % — SIGNIFICANT CHANGE UP (ref 11–15.6)
SODIUM SERPL-SCNC: 138 MMOL/L — SIGNIFICANT CHANGE UP (ref 135–145)
WBC # BLD: 6.1 K/UL — SIGNIFICANT CHANGE UP (ref 4.8–10.8)
WBC # FLD AUTO: 6.1 K/UL — SIGNIFICANT CHANGE UP (ref 4.8–10.8)

## 2018-01-18 PROCEDURE — 99223 1ST HOSP IP/OBS HIGH 75: CPT

## 2018-01-18 PROCEDURE — 99233 SBSQ HOSP IP/OBS HIGH 50: CPT

## 2018-01-18 PROCEDURE — 72148 MRI LUMBAR SPINE W/O DYE: CPT | Mod: 26

## 2018-01-18 PROCEDURE — 72146 MRI CHEST SPINE W/O DYE: CPT | Mod: 26,59

## 2018-01-18 PROCEDURE — 72141 MRI NECK SPINE W/O DYE: CPT | Mod: 26

## 2018-01-18 PROCEDURE — 72147 MRI CHEST SPINE W/DYE: CPT | Mod: 26

## 2018-01-18 RX ORDER — SODIUM CHLORIDE 9 MG/ML
1000 INJECTION INTRAMUSCULAR; INTRAVENOUS; SUBCUTANEOUS
Qty: 0 | Refills: 0 | Status: DISCONTINUED | OUTPATIENT
Start: 2018-01-18 | End: 2018-01-19

## 2018-01-18 RX ORDER — ALPRAZOLAM 0.25 MG
0.25 TABLET ORAL THREE TIMES A DAY
Qty: 0 | Refills: 0 | Status: DISCONTINUED | OUTPATIENT
Start: 2018-01-18 | End: 2018-01-19

## 2018-01-18 RX ADMIN — ATORVASTATIN CALCIUM 40 MILLIGRAM(S): 80 TABLET, FILM COATED ORAL at 21:38

## 2018-01-18 RX ADMIN — ENOXAPARIN SODIUM 40 MILLIGRAM(S): 100 INJECTION SUBCUTANEOUS at 05:58

## 2018-01-18 RX ADMIN — Medication 0.25 MILLIGRAM(S): at 17:16

## 2018-01-18 RX ADMIN — MAGNESIUM OXIDE 400 MG ORAL TABLET 400 MILLIGRAM(S): 241.3 TABLET ORAL at 13:14

## 2018-01-18 RX ADMIN — Medication 10 MILLIEQUIVALENT(S): at 13:14

## 2018-01-18 RX ADMIN — PANTOPRAZOLE SODIUM 40 MILLIGRAM(S): 20 TABLET, DELAYED RELEASE ORAL at 05:57

## 2018-01-18 RX ADMIN — MOMETASONE FUROATE 1 PUFF(S): 220 INHALANT RESPIRATORY (INHALATION) at 09:16

## 2018-01-18 RX ADMIN — Medication 2: at 13:14

## 2018-01-18 RX ADMIN — OXYCODONE AND ACETAMINOPHEN 1 TABLET(S): 5; 325 TABLET ORAL at 11:35

## 2018-01-18 RX ADMIN — BUMETANIDE 1 MILLIGRAM(S): 0.25 INJECTION INTRAMUSCULAR; INTRAVENOUS at 05:57

## 2018-01-18 RX ADMIN — OXYCODONE AND ACETAMINOPHEN 1 TABLET(S): 5; 325 TABLET ORAL at 11:28

## 2018-01-18 RX ADMIN — Medication 5 MILLIGRAM(S): at 00:11

## 2018-01-18 RX ADMIN — MOMETASONE FUROATE 1 PUFF(S): 220 INHALANT RESPIRATORY (INHALATION) at 21:38

## 2018-01-18 RX ADMIN — Medication 81 MILLIGRAM(S): at 13:14

## 2018-01-18 RX ADMIN — ATENOLOL 50 MILLIGRAM(S): 25 TABLET ORAL at 05:57

## 2018-01-18 RX ADMIN — GABAPENTIN 300 MILLIGRAM(S): 400 CAPSULE ORAL at 21:38

## 2018-01-18 RX ADMIN — SODIUM CHLORIDE 75 MILLILITER(S): 9 INJECTION INTRAMUSCULAR; INTRAVENOUS; SUBCUTANEOUS at 23:00

## 2018-01-18 NOTE — DIETITIAN INITIAL EVALUATION ADULT. - OTHER INFO
Pt lost 150 lbs 10 years ago, but recently gained back ~50 lbs. Pt states his cardiologist wants him at 170lbs.

## 2018-01-18 NOTE — PROGRESS NOTE ADULT - SUBJECTIVE AND OBJECTIVE BOX
Pt Name: ALESSIA IBARRA    MRN: [id number]    71 yo M admitted to medicine 1/16/18 w/ hx CABG/ PCI, DM2, Lumbar laminectomy with resulting partial paralysis from waist down, Cervical spine surgery presents with progressive weakness in LE along with back pain and unsteady gait with cane. Denies any back pain ant this time. States his legs feel heavy   Denies new saddle paresthesia, urinary/bowel incontinence or LE weakness.      HEALTH ISSUES - PROBLEM Dx:  Coronary artery disease involving native coronary artery of native heart without angina pectoris: Coronary artery disease involving native coronary artery of native heart without angina pectoris  Type 2 diabetes mellitus without complication, without long-term current use of insulin: Type 2 diabetes mellitus without complication, without long-term current use of insulin  Spinal stenosis of lumbar region with radiculopathy: Spinal stenosis of lumbar region with radiculopathy    REVIEW OF SYSTEMS    General: alert, responsive, in NAD	    Skin/Breast: no rashes, no pruritis  	  Ophthalmologic: No visual changes. No redness  	  ENMT: no discharge, no swelling    Respiratory and Thorax: no difficulty breathing. no cough  	  Cardiovascular:	no chest pain, no palpitations    Gastrointestinal:	no abdominal pain, no diarrhea    Genitourinary: no dysuria, no bleeding    Musculoskeletal:	 see hpi    Neurological: no sensory or motor changes    Psychiatric: no anxiety or depression	    Hematology/Lymphatics:	 no swelling    Endocrine: no Hx of diabetes    ROS is otherwise negative.    PAST MEDICAL & SURGICAL HISTORY:  Anemia  Ventricular tachycardia  Reactive airway disease  Back pain: s/p lumbar lami  Sleep apnea: Intolerant of CPAP  Carpal tunnel syndrome, bilateral: s/p surgery  DJD (degenerative joint disease)  Umbilical hernia  High cholesterol  CAD (coronary artery disease): with stent placement  Diabetes  History of knee replacement, total, left  S/P CABG x 3  H/O umbilical hernia repair  Carpal tunnel syndrome: B/L  S/P surgery  Status post cardiac surgery: triple by-pass surgery, cardiac stents x3  S/P laminectomy: lumbar  Status post arthroscopic knee surgery: left knee x2  History of appendectomy  History of tonsillectomy: appendectpmy  Encounter for screening colonoscopy    Allergies: penicillin (Other)      Medications: aspirin  chewable 81 milliGRAM(s) Oral daily  ATENolol  Tablet 50 milliGRAM(s) Oral daily  atorvastatin 40 milliGRAM(s) Oral at bedtime  buMETAnide 1 milliGRAM(s) Oral daily  dextrose 5%. 1000 milliLiter(s) IV Continuous <Continuous>  dextrose 50% Injectable 12.5 Gram(s) IV Push once  dextrose 50% Injectable 25 Gram(s) IV Push once  dextrose 50% Injectable 25 Gram(s) IV Push once  dextrose Gel 1 Dose(s) Oral once PRN  enoxaparin Injectable 40 milliGRAM(s) SubCutaneous every 24 hours  gabapentin 300 milliGRAM(s) Oral at bedtime  gabapentin 300 milliGRAM(s) Oral three times a day PRN  glucagon  Injectable 1 milliGRAM(s) IntraMuscular once PRN  insulin lispro (HumaLOG) corrective regimen sliding scale   SubCutaneous three times a day before meals  magnesium oxide 400 milliGRAM(s) Oral daily  mometasone 220 MICROgram(s) Inhaler 1 Puff(s) Inhalation two times a day  morphine  - Injectable 2 milliGRAM(s) IV Push every 6 hours PRN  oxyCODONE    5 mG/acetaminophen 325 mG 1 Tablet(s) Oral every 6 hours PRN  pantoprazole    Tablet 40 milliGRAM(s) Oral before breakfast  potassium chloride    Tablet ER 10 milliEquivalent(s) Oral daily      FAMILY HISTORY:  Family history of diabetes mellitus (Sibling)  Family history of prostate cancer  Family history of dementia  Family history of CHF (congestive heart failure)  Family history of early CAD  : non-contributory    Social History:     Ambulation: Walking independently [ ] With Cane [ ] With Walker [ ]  Bedbound [ ]     PHYSICAL EXAM:    Vital Signs Last 24 Hrs  T(C): 36.6 (18 Jan 2018 07:20), Max: 36.9 (17 Jan 2018 23:33)  T(F): 97.8 (18 Jan 2018 07:20), Max: 98.5 (17 Jan 2018 23:33)  HR: 76 (18 Jan 2018 07:20) (63 - 78)  BP: 120/61 (18 Jan 2018 07:20) (107/53 - 120/61)  BP(mean): --  RR: 18 (18 Jan 2018 07:20) (18 - 19)  SpO2: 99% (18 Jan 2018 07:20) (98% - 100%)  Daily     Daily     Appearance: Alert, responsive, in no acute distress.    Skin: no rash on visible skin. Skin is clean, dry and intact. No bleeding. No abrasions. No ulcerations.    Vascular: 2+ distal pulses. Cap refill < 2 sec. No signs of venous insuffiency or stasis. No extremity ulcerations. No cyanosis.    Musculoskeletal:         Left Upper Extremity: Atraumatic with normal alignment NROM. No crepitus. No bony tenderness.        Right Upper Extremity: Atraumatic with normal alignment NROM. No crepitus. No bony tenderness.        Left Lower Extremity: Atraumatic with normal alignment NROM. No crepitus. No bony tenderness.        Right Lower Extremity: Atraumatic with normal alignment NROM. No crepitus. No bony tenderness.     Neurological: Sensation diminished to light touch in all extremities - patient's baseline.     Pathologic reflexes: [ ] Preciado,  [ ]  Clonus, Babinski, Cremasteric, Rectal tone            Reflexes:   Biceps, Brachioradialis, Patella, Achilles intact            Motor exam:          Upper extremity              Bi(C5)  WE(C6)  EE(C7)   FF(C8)                                                R         5/5        5/5        5/5       5/5                                               L          5/5        5/5        5/5       5/5         Lower extremity          HF(L2)   KE(L3)    TA(L4)   EHL(L5)  GS(S1)                                                 R        5/5        5/5        5/5       5/5         5/5                                               L         5/5        5/5       5/5       5/5          5/5    Imaging Studies:     EXAM:  CT REFORM SPINE L                          PROCEDURE DATE:  01/15/2018      INTERPRETATION:  TECHNIQUE: CT examination of the lumbar spine was   performed in continuous axial increments from L1 through the superior   portion of the sacrum. The examination was performed without intravenous   contrast material. The exam was filmed at bone windows and soft tissue   windows. Post processing sagittal and coronal reformatted imaging was   obtained.     HISTORY: Lower back pain.    FINDINGS there is a moderate to severe multifactorial central canal   stenosis@the L4-L5 disc level with a 3 mm anterolisthesis of L4 on L5   secondary to a bilateral facet arthrosis. There is narrowing of the   lateral recess@L5 vertebra resulting in narrowing of the bilateral L5   nerve roots. The left and right L4-L5 foramens are narrowed by superior   displacement of the L5 superior facet.    The L5-S1, L3-4, L2-3, L1-2 and T12-L1 disc levels show no herniated disc   central canal stenosis or foraminal stenosis. No fracture seen. As noted   there is a facet arthropathy involving the L4-S1 facet joints   bilaterally. The  Posterior spinous processes intact. The  Paraspinal soft tissues and the retroperitoneal vascular structures aside   from calcified plaques of abdominal aorta and iliac arteries intact. SI   joints intact.     IMPRESSION:  moderate multifactorial central canal stenosis @the L4-5 disc   level with bilateral L4-5 foraminal stenosis as described.  	  A/P:  Patient is a 72y old  Male who presents with a chief complaint of weakness, back pain (16 Jan 2018 13:42)    PLAN:  * Pain control  * MRI ordered  * Treatment plan to be finalized after review of pending imaging studies

## 2018-01-18 NOTE — PROGRESS NOTE ADULT - SUBJECTIVE AND OBJECTIVE BOX
seen for weakness    complaining of intermittent back/leg pain, pain meds helping.  ROS negative     MEDICATIONS  (STANDING):  aspirin  chewable 81 milliGRAM(s) Oral daily  ATENolol  Tablet 50 milliGRAM(s) Oral daily  atorvastatin 40 milliGRAM(s) Oral at bedtime  buMETAnide 1 milliGRAM(s) Oral daily  dextrose 5%. 1000 milliLiter(s) (50 mL/Hr) IV Continuous <Continuous>  dextrose 50% Injectable 12.5 Gram(s) IV Push once  dextrose 50% Injectable 25 Gram(s) IV Push once  dextrose 50% Injectable 25 Gram(s) IV Push once  enoxaparin Injectable 40 milliGRAM(s) SubCutaneous every 24 hours  gabapentin 300 milliGRAM(s) Oral at bedtime  insulin lispro (HumaLOG) corrective regimen sliding scale   SubCutaneous three times a day before meals  magnesium oxide 400 milliGRAM(s) Oral daily  mometasone 220 MICROgram(s) Inhaler 1 Puff(s) Inhalation two times a day  pantoprazole    Tablet 40 milliGRAM(s) Oral before breakfast  potassium chloride    Tablet ER 10 milliEquivalent(s) Oral daily    MEDICATIONS  (PRN):  dextrose Gel 1 Dose(s) Oral once PRN Blood Glucose LESS THAN 70 milliGRAM(s)/deciliter  gabapentin 300 milliGRAM(s) Oral three times a day PRN moderate pain  glucagon  Injectable 1 milliGRAM(s) IntraMuscular once PRN Glucose LESS THAN 70 milligrams/deciliter  morphine  - Injectable 2 milliGRAM(s) IV Push every 6 hours PRN breakthrough pain  oxyCODONE    5 mG/acetaminophen 325 mG 1 Tablet(s) Oral every 6 hours PRN Severe Pain (7 - 10)      Allergies    penicillin (Other)    Vital Signs Last 24 Hrs  T(C): 36.6 (18 Jan 2018 07:20), Max: 36.9 (17 Jan 2018 23:33)  T(F): 97.8 (18 Jan 2018 07:20), Max: 98.5 (17 Jan 2018 23:33)  HR: 76 (18 Jan 2018 07:20) (63 - 76)  BP: 120/61 (18 Jan 2018 07:20) (107/53 - 120/61)  BP(mean): --  RR: 18 (18 Jan 2018 07:20) (18 - 19)  SpO2: 99% (18 Jan 2018 07:20) (99% - 100%)    PHYSICAL EXAM:    GENERAL: NAD   CHEST/LUNG: Clear to percussion bilaterally  HEART: Regular rate and rhythm; S1 S2  ABDOMEN: Soft, Nontender, Nondistended; Bowel sounds present  EXTREMITIES:  no edema.   NERVOUS SYSTEM:  Alert & Oriented X3 nonfocal  PSYCH: normal mood, appropriate response.    LABS:                CAPILLARY BLOOD GLUCOSE      POCT Blood Glucose.: 195 mg/dL (18 Jan 2018 10:53)  POCT Blood Glucose.: 105 mg/dL (18 Jan 2018 07:28)  POCT Blood Glucose.: 116 mg/dL (17 Jan 2018 21:36)  POCT Blood Glucose.: 168 mg/dL (17 Jan 2018 17:04)  POCT Blood Glucose.: 154 mg/dL (17 Jan 2018 11:44)        RADIOLOGY & ADDITIONAL TESTS:

## 2018-01-19 ENCOUNTER — TRANSCRIPTION ENCOUNTER (OUTPATIENT)
Age: 73
End: 2018-01-19

## 2018-01-19 VITALS
OXYGEN SATURATION: 99 % | TEMPERATURE: 98 F | DIASTOLIC BLOOD PRESSURE: 65 MMHG | RESPIRATION RATE: 18 BRPM | HEART RATE: 58 BPM | SYSTOLIC BLOOD PRESSURE: 126 MMHG

## 2018-01-19 LAB
ANION GAP SERPL CALC-SCNC: 17 MMOL/L — SIGNIFICANT CHANGE UP (ref 5–17)
BUN SERPL-MCNC: 53 MG/DL — HIGH (ref 8–20)
CALCIUM SERPL-MCNC: 8.8 MG/DL — SIGNIFICANT CHANGE UP (ref 8.6–10.2)
CHLORIDE SERPL-SCNC: 98 MMOL/L — SIGNIFICANT CHANGE UP (ref 98–107)
CO2 SERPL-SCNC: 26 MMOL/L — SIGNIFICANT CHANGE UP (ref 22–29)
CREAT SERPL-MCNC: 1.17 MG/DL — SIGNIFICANT CHANGE UP (ref 0.5–1.3)
GLUCOSE BLDC GLUCOMTR-MCNC: 111 MG/DL — HIGH (ref 70–99)
GLUCOSE BLDC GLUCOMTR-MCNC: 178 MG/DL — HIGH (ref 70–99)
GLUCOSE BLDC GLUCOMTR-MCNC: 193 MG/DL — HIGH (ref 70–99)
GLUCOSE SERPL-MCNC: 119 MG/DL — HIGH (ref 70–115)
POTASSIUM SERPL-MCNC: 4.1 MMOL/L — SIGNIFICANT CHANGE UP (ref 3.5–5.3)
POTASSIUM SERPL-SCNC: 4.1 MMOL/L — SIGNIFICANT CHANGE UP (ref 3.5–5.3)
SODIUM SERPL-SCNC: 141 MMOL/L — SIGNIFICANT CHANGE UP (ref 135–145)

## 2018-01-19 PROCEDURE — 36415 COLL VENOUS BLD VENIPUNCTURE: CPT

## 2018-01-19 PROCEDURE — 96374 THER/PROPH/DIAG INJ IV PUSH: CPT | Mod: XU

## 2018-01-19 PROCEDURE — 85652 RBC SED RATE AUTOMATED: CPT

## 2018-01-19 PROCEDURE — 99285 EMERGENCY DEPT VISIT HI MDM: CPT | Mod: 25

## 2018-01-19 PROCEDURE — 82962 GLUCOSE BLOOD TEST: CPT

## 2018-01-19 PROCEDURE — 80048 BASIC METABOLIC PNL TOTAL CA: CPT

## 2018-01-19 PROCEDURE — 83605 ASSAY OF LACTIC ACID: CPT

## 2018-01-19 PROCEDURE — 72141 MRI NECK SPINE W/O DYE: CPT

## 2018-01-19 PROCEDURE — 99239 HOSP IP/OBS DSCHRG MGMT >30: CPT

## 2018-01-19 PROCEDURE — 85027 COMPLETE CBC AUTOMATED: CPT

## 2018-01-19 PROCEDURE — 96375 TX/PRO/DX INJ NEW DRUG ADDON: CPT | Mod: XU

## 2018-01-19 PROCEDURE — 97163 PT EVAL HIGH COMPLEX 45 MIN: CPT

## 2018-01-19 PROCEDURE — 83036 HEMOGLOBIN GLYCOSYLATED A1C: CPT

## 2018-01-19 PROCEDURE — 72147 MRI CHEST SPINE W/DYE: CPT

## 2018-01-19 PROCEDURE — 74177 CT ABD & PELVIS W/CONTRAST: CPT

## 2018-01-19 PROCEDURE — 93005 ELECTROCARDIOGRAM TRACING: CPT

## 2018-01-19 PROCEDURE — 94640 AIRWAY INHALATION TREATMENT: CPT

## 2018-01-19 PROCEDURE — 72148 MRI LUMBAR SPINE W/O DYE: CPT

## 2018-01-19 PROCEDURE — 80053 COMPREHEN METABOLIC PANEL: CPT

## 2018-01-19 PROCEDURE — 72146 MRI CHEST SPINE W/O DYE: CPT

## 2018-01-19 PROCEDURE — 81001 URINALYSIS AUTO W/SCOPE: CPT

## 2018-01-19 PROCEDURE — 86140 C-REACTIVE PROTEIN: CPT

## 2018-01-19 RX ORDER — ALPRAZOLAM 0.25 MG
1 TABLET ORAL
Qty: 0 | Refills: 0 | COMMUNITY
Start: 2018-01-19

## 2018-01-19 RX ORDER — LANOLIN ALCOHOL/MO/W.PET/CERES
5 CREAM (GRAM) TOPICAL ONCE
Qty: 0 | Refills: 0 | Status: COMPLETED | OUTPATIENT
Start: 2018-01-19 | End: 2018-01-19

## 2018-01-19 RX ORDER — ALPRAZOLAM 0.25 MG
1 TABLET ORAL
Qty: 0 | Refills: 0 | DISCHARGE
Start: 2018-01-19

## 2018-01-19 RX ADMIN — Medication 2: at 16:05

## 2018-01-19 RX ADMIN — MOMETASONE FUROATE 1 PUFF(S): 220 INHALANT RESPIRATORY (INHALATION) at 06:02

## 2018-01-19 RX ADMIN — OXYCODONE AND ACETAMINOPHEN 1 TABLET(S): 5; 325 TABLET ORAL at 11:58

## 2018-01-19 RX ADMIN — ATENOLOL 50 MILLIGRAM(S): 25 TABLET ORAL at 06:02

## 2018-01-19 RX ADMIN — Medication 5 MILLIGRAM(S): at 01:30

## 2018-01-19 RX ADMIN — BUMETANIDE 1 MILLIGRAM(S): 0.25 INJECTION INTRAMUSCULAR; INTRAVENOUS at 06:02

## 2018-01-19 RX ADMIN — MAGNESIUM OXIDE 400 MG ORAL TABLET 400 MILLIGRAM(S): 241.3 TABLET ORAL at 11:52

## 2018-01-19 RX ADMIN — OXYCODONE AND ACETAMINOPHEN 1 TABLET(S): 5; 325 TABLET ORAL at 01:26

## 2018-01-19 RX ADMIN — Medication 2: at 11:51

## 2018-01-19 RX ADMIN — PANTOPRAZOLE SODIUM 40 MILLIGRAM(S): 20 TABLET, DELAYED RELEASE ORAL at 06:02

## 2018-01-19 RX ADMIN — Medication 0.25 MILLIGRAM(S): at 15:41

## 2018-01-19 RX ADMIN — OXYCODONE AND ACETAMINOPHEN 1 TABLET(S): 5; 325 TABLET ORAL at 13:00

## 2018-01-19 RX ADMIN — ENOXAPARIN SODIUM 40 MILLIGRAM(S): 100 INJECTION SUBCUTANEOUS at 06:02

## 2018-01-19 RX ADMIN — GABAPENTIN 300 MILLIGRAM(S): 400 CAPSULE ORAL at 22:00

## 2018-01-19 RX ADMIN — OXYCODONE AND ACETAMINOPHEN 1 TABLET(S): 5; 325 TABLET ORAL at 00:26

## 2018-01-19 RX ADMIN — ATORVASTATIN CALCIUM 40 MILLIGRAM(S): 80 TABLET, FILM COATED ORAL at 22:00

## 2018-01-19 RX ADMIN — Medication 81 MILLIGRAM(S): at 11:52

## 2018-01-19 RX ADMIN — Medication 10 MILLIEQUIVALENT(S): at 11:52

## 2018-01-19 NOTE — PROGRESS NOTE ADULT - PROBLEM SELECTOR PLAN 2
shanta  hold glipizide and metformin as inpatient

## 2018-01-19 NOTE — PROGRESS NOTE ADULT - SUBJECTIVE AND OBJECTIVE BOX
ALESSIA STACEY    [id number]                      71 yo M admitted to medicine 1/16/18 w/ hx CABG/ PCI, DM2, Lumbar laminectomy and Prior cervical spine surgery. Patient presents with progressive weakness in LE along with back pain and unsteady gait with cane. Denies any back pain ant this time. States his legs feel heavy   Denies new saddle paresthesia, urinary/bowel incontinence or LE weakness.      SUBJECTIVE: Patient seen and examined with Dr Fuller    Pain (0-10): 0    OBJECTIVE:     Vital Signs Last 24 Hrs  T(C): 36.9 (19 Jan 2018 07:51), Max: 36.9 (18 Jan 2018 15:08)  T(F): 98.4 (19 Jan 2018 07:51), Max: 98.5 (18 Jan 2018 15:08)  HR: 64 (19 Jan 2018 07:51) (50 - 64)  BP: 128/70 (19 Jan 2018 07:51) (96/52 - 128/70)  BP(mean): --  RR: 20 (19 Jan 2018 07:51) (18 - 20)  SpO2: 99% (19 Jan 2018 07:51) (98% - 100%)  I&O's Summary      Constitutional: Alert, responsive, in no acute distress.     Abdominal: soft and supple non distended    Lymphatics no pretibial pitting edema    Spine:          Sensation: at baseline          Pathologic reflexes: No Clonus noted                       Motor exam:  Full ROM bilateral UE,  strength equal.   LE: bilaterally patient able to SLR, Has full ROM and strength 4/5 equally.   warm well perfused; capillary refill <3 seconds       LABS:                        11.6   6.1   )-----------( 207      ( 18 Jan 2018 18:46 )             34.3       01-18    138  |  96<L>  |  50.0<H>  ----------------------------<  182<H>  4.6   |  28.0  |  1.33<H>    Ca    8.9      18 Jan 2018 18:46          RADIOLOGY & ADDITIONAL STUDIES:   ******PRELIMINARY REPORT******    ******PRELIMINARY REPORT******           EXAM:  MR SPINE THORACIC IC                          PROCEDURE DATE:  01/18/2018        ******PRELIMINARY REPORT******    ******PRELIMINARY REPORT******          INTERPRETATION:  [facility] RADIOLOGIST PRELIMINARY REPORT    EXAM:    MR Thoracic Spine Without and With Intravenous Contrast    CLINICAL HISTORY:    72 years old, male; Pain; Pain in thoracic spine; Without myelpathy or   radiculopathy    TECHNIQUE:    Magnetic resonance images of the thoracic spine without and with   intravenous   contrast in multiple planes.    CONTRAST:    9 mL of gadavist administered intravenously.    COMPARISON:    MR THORACIC SPINE 2018-01-18 08:23    FINDINGS:    Vertebrae:  Postcontrastimaging through the thoracic spine was   obtained to   complement a prior study from 8:32 AM this morning.  No acute fracture.    Discs/spinal canal/neural foramina:  At all levels from C7-T1 to T8-9,   most   pronounced at T6-7, there is fairly diffuse but mild enhancement of the   thoracic discs.  There is no significant disc herniation, central or   neural   foraminal stenosis at any level. There is multilevel disc desiccation. On   prior   STIR images, only the approximately T7-8 disc demonstrates moderately   increased   signal.    Spinal cord:  Unremarkable.  Normal signal.  No abnormal enhancement.    Soft tissues:  Unremarkable.  Other: Axial postcontrast images are significantly degraded by patient   motion   artifact.    IMPRESSION:        At all levels from C7-T1 to T8-9, most pronounced at T6-7, there is   fairly   diffuse but mild enhancement of the thoracic discs. No other abnormal   enhancement is seen. There is no dominant disc enhancement, abnormally   increased or signal, epidural enhancement or other findings suggestive of   infection, aside from the slight increased or signal at approximately   T7-8.   Findings are felt most likely to reflect inflammatory or arthritic   enhancement.   Multilevel discitis cannot be absolutely excluded but is felt much less   likely.   Correlation with sedimentation rate and white cell count would be helpful   to   exclude the somewhat less likely possibility of discitis at multiple   levels.   Otherwise unremarkable MRI thoracic spine with contrast.          ******PRELIMINARY REPORT******    ******PRELIMINARY REPORT******              JESSICA JACKSON M.D.;[facility] RADIOLOGIST        A/P :  72y Male S/P         POD#  -   Patient is familiar to our service. Lengthy discussion with Dr Fuller regarding options.   PT will see patient again this morning, pending how he does will determine treatment.   If patient is able to ambulate with walker or cane he may DC to [facility] today. If patient unable to accomplish that we will start the surgical pre op fpr next week.  Patient understands his risks/ benefits and options. We will follow up after PT sees patient. ALESSIA STACEY    [id number]                      71 yo M admitted to medicine 1/16/18 w/ hx CABG/ PCI, DM2, Lumbar laminectomy and Prior cervical spine surgery. Patient presents with progressive weakness in LE along with back pain and unsteady gait with cane. Denies any back pain ant this time. States his legs feel heavy   Denies new saddle paresthesia, urinary/bowel incontinence or LE weakness.      SUBJECTIVE: Patient seen and examined with Dr Fuller    Pain (0-10): 0    OBJECTIVE:     Vital Signs Last 24 Hrs  T(C): 36.9 (19 Jan 2018 07:51), Max: 36.9 (18 Jan 2018 15:08)  T(F): 98.4 (19 Jan 2018 07:51), Max: 98.5 (18 Jan 2018 15:08)  HR: 64 (19 Jan 2018 07:51) (50 - 64)  BP: 128/70 (19 Jan 2018 07:51) (96/52 - 128/70)  BP(mean): --  RR: 20 (19 Jan 2018 07:51) (18 - 20)  SpO2: 99% (19 Jan 2018 07:51) (98% - 100%)  I&O's Summary      Constitutional: Alert, responsive, in no acute distress.     Abdominal: soft and supple non distended    Lymphatics no pretibial pitting edema    Spine:          Sensation: at baseline          Pathologic reflexes: No Clonus noted                       Motor exam:  Full ROM bilateral UE,  strength equal.   LE: bilaterally patient able to SLR, Has full ROM and strength 4/5 equally.   warm well perfused; capillary refill <3 seconds       LABS:                        11.6   6.1   )-----------( 207      ( 18 Jan 2018 18:46 )             34.3       01-18    138  |  96<L>  |  50.0<H>  ----------------------------<  182<H>  4.6   |  28.0  |  1.33<H>    Ca    8.9      18 Jan 2018 18:46          RADIOLOGY & ADDITIONAL STUDIES:   ******PRELIMINARY REPORT******    ******PRELIMINARY REPORT******           EXAM:  MR SPINE THORACIC IC                          PROCEDURE DATE:  01/18/2018        ******PRELIMINARY REPORT******    ******PRELIMINARY REPORT******          INTERPRETATION:  [facility] RADIOLOGIST PRELIMINARY REPORT    EXAM:    MR Thoracic Spine Without and With Intravenous Contrast    CLINICAL HISTORY:    72 years old, male; Pain; Pain in thoracic spine; Without myelpathy or   radiculopathy    TECHNIQUE:    Magnetic resonance images of the thoracic spine without and with   intravenous   contrast in multiple planes.    CONTRAST:    9 mL of gadavist administered intravenously.    COMPARISON:    MR THORACIC SPINE 2018-01-18 08:23    FINDINGS:    Vertebrae:  Postcontrastimaging through the thoracic spine was   obtained to   complement a prior study from 8:32 AM this morning.  No acute fracture.    Discs/spinal canal/neural foramina:  At all levels from C7-T1 to T8-9,   most   pronounced at T6-7, there is fairly diffuse but mild enhancement of the   thoracic discs.  There is no significant disc herniation, central or   neural   foraminal stenosis at any level. There is multilevel disc desiccation. On   prior   STIR images, only the approximately T7-8 disc demonstrates moderately   increased   signal.    Spinal cord:  Unremarkable.  Normal signal.  No abnormal enhancement.    Soft tissues:  Unremarkable.  Other: Axial postcontrast images are significantly degraded by patient   motion   artifact.    IMPRESSION:        At all levels from C7-T1 to T8-9, most pronounced at T6-7, there is   fairly   diffuse but mild enhancement of the thoracic discs. No other abnormal   enhancement is seen. There is no dominant disc enhancement, abnormally   increased or signal, epidural enhancement or other findings suggestive of   infection, aside from the slight increased or signal at approximately   T7-8.   Findings are felt most likely to reflect inflammatory or arthritic   enhancement.   Multilevel discitis cannot be absolutely excluded but is felt much less   likely.   Correlation with sedimentation rate and white cell count would be helpful   to   exclude the somewhat less likely possibility of discitis at multiple   levels.   Otherwise unremarkable MRI thoracic spine with contrast.          ******PRELIMINARY REPORT******    ******PRELIMINARY REPORT******              JESSICA JACKSON M.D.;[facility] RADIOLOGIST        A/P :  72y Male S/P         POD#  -   Patient is familiar to our service. Lengthy discussion with Dr Fuller regarding options.   PT will see patient again this morning, pending how he does will determine treatment.   If patient is able to ambulate with walker or cane he may DC [facility] today. If patient unable to accomplish that we will start the surgical pre op fpr next week.  Patient understands his risks/ benefits and options. We will follow up after PT sees patient.     Pt evaluated with no path reflexes and did well with PT/OT. Secondary to no acute radiographixc changes I rec. CARMELA and out pt scheduling for post cervical lami and lumbar lami. pt agrees with immediate conservative mngt

## 2018-01-19 NOTE — DISCHARGE NOTE ADULT - MEDICATION SUMMARY - MEDICATIONS TO TAKE
I will START or STAY ON the medications listed below when I get home from the hospital:    aspirin 81 mg oral tablet, chewable  -- 1 tab(s) by mouth once a day  -- Indication: For CAD (coronary artery disease)    oxyCODONE-acetaminophen 5 mg-325 mg oral tablet  -- 1 tab(s) by mouth every 6 hours, As needed, Severe Pain (7 - 10)  -- Indication: For pain    nitroglycerin 0.3 mg/hr transdermal film, extended release  -- 1 patch by transdermal patch once a day  -- Indication: For CAD (coronary artery disease)    gabapentin 100 mg oral tablet  -- 3 tab(s) by mouth once a day (at bedtime)  -- Indication: For pain    glipiZIDE 5 mg oral tablet, extended release  -- 1 tab(s) by mouth once a day  -- Indication: For Diabetes    metFORMIN 500 mg oral tablet, extended release  -- 1 tab(s) by mouth once a day  -- hold for 3 days please  -- Indication: For Diabetes    simvastatin 80 mg oral tablet  -- 1 tab(s) by mouth once a day (at bedtime)  -- Indication: For CAD (coronary artery disease)    ALPRAZolam 0.25 mg oral tablet  -- 1 tab(s) by mouth 3 times a day, As needed, anxiety  -- Indication: For anxiety    atenolol 50 mg oral tablet  -- 1 tab(s) by mouth once a day  -- Indication: For CAD (coronary artery disease)    bumetanide 1 mg oral tablet  -- 1 tab(s) by mouth once a day  -- Indication: For CAD (coronary artery disease)    ferrous sulfate  -- 65 milligram(s) by mouth once a day  -- Indication: For anemia    magnesium oxide 400 mg (241.3 mg elemental magnesium) oral tablet  -- 1 tab(s) by mouth once a day  -- Indication: For Supplement    potassium chloride 10 mEq oral tablet, extended release  -- 1 tab(s) by mouth once a day  -- Indication: For Supplement    omeprazole 40 mg oral delayed release capsule  -- 1 cap(s) by mouth once a day  -- Indication: For reflux    Asmanex HFA  -- 220 microgram(s) inhaled 2 times a day  -- Indication: For Shortness of breath

## 2018-01-19 NOTE — DISCHARGE NOTE ADULT - PATIENT PORTAL LINK FT
“You can access the Follow[employer] Patient Portal, offered by [facility], by registering with the following website: [email or web address]”

## 2018-01-19 NOTE — DISCHARGE NOTE ADULT - SECONDARY DIAGNOSIS.
EFRAÍN (acute kidney injury) Coronary artery disease involving native coronary artery of native heart without angina pectoris Type 2 diabetes mellitus without complication, without long-term current use of insulin

## 2018-01-19 NOTE — DISCHARGE NOTE ADULT - PLAN OF CARE
improved with IV fluids.  cautious use of bumex. improvement. physical therapy and pain control  follow up with Dr Fuller in 2 weeks. home medications well controlled  home medications

## 2018-01-19 NOTE — PROGRESS NOTE ADULT - SUBJECTIVE AND OBJECTIVE BOX
seen for spinal stenosis, weakness    no acute complaints  gen weakness.  ambulated with PT this am.  Ros otherwise negative     MEDICATIONS  (STANDING):  aspirin  chewable 81 milliGRAM(s) Oral daily  ATENolol  Tablet 50 milliGRAM(s) Oral daily  atorvastatin 40 milliGRAM(s) Oral at bedtime  dextrose 5%. 1000 milliLiter(s) (50 mL/Hr) IV Continuous <Continuous>  dextrose 50% Injectable 12.5 Gram(s) IV Push once  dextrose 50% Injectable 25 Gram(s) IV Push once  dextrose 50% Injectable 25 Gram(s) IV Push once  enoxaparin Injectable 40 milliGRAM(s) SubCutaneous every 24 hours  gabapentin 300 milliGRAM(s) Oral at bedtime  insulin lispro (HumaLOG) corrective regimen sliding scale   SubCutaneous three times a day before meals  magnesium oxide 400 milliGRAM(s) Oral daily  mometasone 220 MICROgram(s) Inhaler 1 Puff(s) Inhalation two times a day  pantoprazole    Tablet 40 milliGRAM(s) Oral before breakfast  potassium chloride    Tablet ER 10 milliEquivalent(s) Oral daily  sodium chloride 0.9%. 1000 milliLiter(s) (75 mL/Hr) IV Continuous <Continuous>    MEDICATIONS  (PRN):  ALPRAZolam 0.25 milliGRAM(s) Oral three times a day PRN anxiety  dextrose Gel 1 Dose(s) Oral once PRN Blood Glucose LESS THAN 70 milliGRAM(s)/deciliter  gabapentin 300 milliGRAM(s) Oral three times a day PRN moderate pain  glucagon  Injectable 1 milliGRAM(s) IntraMuscular once PRN Glucose LESS THAN 70 milligrams/deciliter  morphine  - Injectable 2 milliGRAM(s) IV Push every 6 hours PRN breakthrough pain  oxyCODONE    5 mG/acetaminophen 325 mG 1 Tablet(s) Oral every 6 hours PRN Severe Pain (7 - 10)      Allergies    penicillin (Other)      Vital Signs Last 24 Hrs  T(C): 36.9 (19 Jan 2018 07:51), Max: 36.9 (18 Jan 2018 15:08)  T(F): 98.4 (19 Jan 2018 07:51), Max: 98.5 (18 Jan 2018 15:08)  HR: 64 (19 Jan 2018 07:51) (50 - 64)  BP: 128/70 (19 Jan 2018 07:51) (96/52 - 128/70)  BP(mean): --  RR: 20 (19 Jan 2018 07:51) (18 - 20)  SpO2: 99% (19 Jan 2018 07:51) (98% - 100%)    PHYSICAL EXAM:    GENERAL: NAD  CHEST/LUNG: Clear to percussion bilaterally  HEART: Regular rate and rhythm; S1 S2  ABDOMEN: Soft, Nontender, Nondistended; Bowel sounds present  EXTREMITIES: no edema  NERVOUS SYSTEM:  Alert & Oriented X3, spontaneous movement extremities x 4  LABS:                        11.6   6.1   )-----------( 207      ( 18 Jan 2018 18:46 )             34.3     01-19    141  |  98  |  53.0<H>  ----------------------------<  119<H>  4.1   |  26.0  |  1.17    Ca    8.8      19 Jan 2018 09:03            CAPILLARY BLOOD GLUCOSE      POCT Blood Glucose.: 193 mg/dL (19 Jan 2018 11:50)  POCT Blood Glucose.: 111 mg/dL (19 Jan 2018 07:52)  POCT Blood Glucose.: 113 mg/dL (18 Jan 2018 21:55)  POCT Blood Glucose.: 112 mg/dL (18 Jan 2018 16:19)        RADIOLOGY & ADDITIONAL TESTS:

## 2018-01-19 NOTE — DISCHARGE NOTE ADULT - CARE PLAN
Principal Discharge DX:	Spinal stenosis of lumbar region, unspecified whether neurogenic claudication present  Goal:	improvement.  Assessment and plan of treatment:	physical therapy and pain control  follow up with Dr Fuller in 2 weeks.  Secondary Diagnosis:	Coronary artery disease involving native coronary artery of native heart without angina pectoris  Assessment and plan of treatment:	home medications  Secondary Diagnosis:	Type 2 diabetes mellitus without complication, without long-term current use of insulin  Assessment and plan of treatment:	well controlled  home medications  Secondary Diagnosis:	EFRAÍN (acute kidney injury)  Assessment and plan of treatment:	improved with IV fluids.  cautious use of bumex.

## 2018-01-19 NOTE — PROGRESS NOTE ADULT - PROBLEM SELECTOR PLAN 1
restart home meds, prn pain meds  MRI ordered after discussion with orthopedics  CARMELA pending discharge
restart home meds, prn pain meds  MRI ordered after discussion with orthopedics-- awaiting recommendations  CARMELA pending discharge
home meds, prn pain meds  MRI ordered after discussion with orthopedics--CARMELA with outpatient follow up as ambulating with PT

## 2018-01-19 NOTE — PROGRESS NOTE ADULT - PROBLEM SELECTOR PROBLEM 1
Spinal stenosis of lumbar region with radiculopathy

## 2018-01-29 ENCOUNTER — OTHER (OUTPATIENT)
Age: 73
End: 2018-01-29

## 2018-02-14 ENCOUNTER — APPOINTMENT (OUTPATIENT)
Dept: ORTHOPEDIC SURGERY | Facility: CLINIC | Age: 73
End: 2018-02-14
Payer: COMMERCIAL

## 2018-02-14 VITALS
DIASTOLIC BLOOD PRESSURE: 57 MMHG | HEIGHT: 72 IN | WEIGHT: 194 LBS | BODY MASS INDEX: 26.28 KG/M2 | SYSTOLIC BLOOD PRESSURE: 96 MMHG | HEART RATE: 54 BPM

## 2018-02-14 PROCEDURE — 72040 X-RAY EXAM NECK SPINE 2-3 VW: CPT

## 2018-02-14 PROCEDURE — 99215 OFFICE O/P EST HI 40 MIN: CPT

## 2018-03-26 ENCOUNTER — OUTPATIENT (OUTPATIENT)
Dept: OUTPATIENT SERVICES | Facility: HOSPITAL | Age: 73
LOS: 1 days | End: 2018-03-26
Payer: MEDICARE

## 2018-03-26 VITALS
HEART RATE: 51 BPM | TEMPERATURE: 98 F | SYSTOLIC BLOOD PRESSURE: 98 MMHG | WEIGHT: 199.96 LBS | RESPIRATION RATE: 16 BRPM | DIASTOLIC BLOOD PRESSURE: 53 MMHG | HEIGHT: 72 IN

## 2018-03-26 DIAGNOSIS — Z98.89 OTHER SPECIFIED POSTPROCEDURAL STATES: Chronic | ICD-10-CM

## 2018-03-26 DIAGNOSIS — Z95.1 PRESENCE OF AORTOCORONARY BYPASS GRAFT: Chronic | ICD-10-CM

## 2018-03-26 DIAGNOSIS — E11.9 TYPE 2 DIABETES MELLITUS WITHOUT COMPLICATIONS: ICD-10-CM

## 2018-03-26 DIAGNOSIS — Z96.652 PRESENCE OF LEFT ARTIFICIAL KNEE JOINT: Chronic | ICD-10-CM

## 2018-03-26 DIAGNOSIS — M19.90 UNSPECIFIED OSTEOARTHRITIS, UNSPECIFIED SITE: ICD-10-CM

## 2018-03-26 DIAGNOSIS — I25.10 ATHEROSCLEROTIC HEART DISEASE OF NATIVE CORONARY ARTERY WITHOUT ANGINA PECTORIS: ICD-10-CM

## 2018-03-26 DIAGNOSIS — Z29.9 ENCOUNTER FOR PROPHYLACTIC MEASURES, UNSPECIFIED: ICD-10-CM

## 2018-03-26 DIAGNOSIS — Z12.11 ENCOUNTER FOR SCREENING FOR MALIGNANT NEOPLASM OF COLON: Chronic | ICD-10-CM

## 2018-03-26 DIAGNOSIS — Z01.818 ENCOUNTER FOR OTHER PREPROCEDURAL EXAMINATION: ICD-10-CM

## 2018-03-26 DIAGNOSIS — G56.00 CARPAL TUNNEL SYNDROME, UNSPECIFIED UPPER LIMB: Chronic | ICD-10-CM

## 2018-03-26 LAB
ANION GAP SERPL CALC-SCNC: 11 MMOL/L — SIGNIFICANT CHANGE UP (ref 5–17)
APPEARANCE UR: CLEAR — SIGNIFICANT CHANGE UP
APTT BLD: 33.8 SEC — SIGNIFICANT CHANGE UP (ref 27.5–37.4)
BILIRUB UR-MCNC: NEGATIVE — SIGNIFICANT CHANGE UP
BUN SERPL-MCNC: 37 MG/DL — HIGH (ref 8–20)
CALCIUM SERPL-MCNC: 9.5 MG/DL — SIGNIFICANT CHANGE UP (ref 8.6–10.2)
CHLORIDE SERPL-SCNC: 100 MMOL/L — SIGNIFICANT CHANGE UP (ref 98–107)
CO2 SERPL-SCNC: 29 MMOL/L — SIGNIFICANT CHANGE UP (ref 22–29)
COLOR SPEC: SIGNIFICANT CHANGE UP
CREAT SERPL-MCNC: 0.91 MG/DL — SIGNIFICANT CHANGE UP (ref 0.5–1.3)
DIFF PNL FLD: NEGATIVE — SIGNIFICANT CHANGE UP
GLUCOSE SERPL-MCNC: 98 MG/DL — SIGNIFICANT CHANGE UP (ref 70–115)
GLUCOSE UR QL: NEGATIVE MG/DL — SIGNIFICANT CHANGE UP
HBA1C BLD-MCNC: 5.6 % — SIGNIFICANT CHANGE UP (ref 4–5.6)
HCT VFR BLD CALC: 36.1 % — LOW (ref 42–52)
HGB BLD-MCNC: 12 G/DL — LOW (ref 14–18)
INR BLD: 0.94 RATIO — SIGNIFICANT CHANGE UP (ref 0.88–1.16)
KETONES UR-MCNC: NEGATIVE — SIGNIFICANT CHANGE UP
LEUKOCYTE ESTERASE UR-ACNC: NEGATIVE — SIGNIFICANT CHANGE UP
MCHC RBC-ENTMCNC: 30.5 PG — SIGNIFICANT CHANGE UP (ref 27–31)
MCHC RBC-ENTMCNC: 33.2 G/DL — SIGNIFICANT CHANGE UP (ref 32–36)
MCV RBC AUTO: 91.6 FL — SIGNIFICANT CHANGE UP (ref 80–94)
MRSA PCR RESULT.: SIGNIFICANT CHANGE UP
NITRITE UR-MCNC: NEGATIVE — SIGNIFICANT CHANGE UP
PH UR: 6 — SIGNIFICANT CHANGE UP (ref 5–8)
PLATELET # BLD AUTO: 194 K/UL — SIGNIFICANT CHANGE UP (ref 150–400)
POTASSIUM SERPL-MCNC: 4.3 MMOL/L — SIGNIFICANT CHANGE UP (ref 3.5–5.3)
POTASSIUM SERPL-SCNC: 4.3 MMOL/L — SIGNIFICANT CHANGE UP (ref 3.5–5.3)
PROT UR-MCNC: NEGATIVE MG/DL — SIGNIFICANT CHANGE UP
PROTHROM AB SERPL-ACNC: 10.3 SEC — SIGNIFICANT CHANGE UP (ref 9.8–12.7)
RBC # BLD: 3.94 M/UL — LOW (ref 4.6–6.2)
RBC # FLD: 13 % — SIGNIFICANT CHANGE UP (ref 11–15.6)
S AUREUS DNA NOSE QL NAA+PROBE: SIGNIFICANT CHANGE UP
SODIUM SERPL-SCNC: 140 MMOL/L — SIGNIFICANT CHANGE UP (ref 135–145)
SP GR SPEC: 1.01 — SIGNIFICANT CHANGE UP (ref 1.01–1.02)
TYPE + AB SCN PNL BLD: SIGNIFICANT CHANGE UP
UROBILINOGEN FLD QL: NEGATIVE MG/DL — SIGNIFICANT CHANGE UP
WBC # BLD: 7.4 K/UL — SIGNIFICANT CHANGE UP (ref 4.8–10.8)
WBC # FLD AUTO: 7.4 K/UL — SIGNIFICANT CHANGE UP (ref 4.8–10.8)

## 2018-03-26 PROCEDURE — 93010 ELECTROCARDIOGRAM REPORT: CPT

## 2018-03-26 NOTE — H&P PST ADULT - ASSESSMENT
72 year old male presents with cervical spine disorder scheduled for a cervical laminectomy at C4-C5-C6 and a posterior cervical fusion on 4/9/18. 72 year old male presents with cervical spine disorder scheduled for a cervical laminectomy at C4-C5-C6 and a posterior cervical fusion on 18.  CAPRINI SCORE [CLOT]    AGE RELATED RISK FACTORS                                                       MOBILITY RELATED FACTORS  [ ] Age 41-60 years                                            (1 Point)                  [ ] Bed rest                                                        (1 Point)  [X ] Age: 61-74 years                                           (2 Points)                 [ ] Plaster cast                                                   (2 Points)  [ ] Age= 75 years                                              (3 Points)                 [ ] Bed bound for more than 72 hours                 (2 Points)    DISEASE RELATED RISK FACTORS                                               GENDER SPECIFIC FACTORS  [ ] Edema in the lower extremities                       (1 Point)                  [ ] Pregnancy                                                     (1 Point)  [ ] Varicose veins                                               (1 Point)                  [ ] Post-partum < 6 weeks                                   (1 Point)             [X ] BMI > 25 Kg/m2                                            (1 Point)                  [ ] Hormonal therapy  or oral contraception          (1 Point)                 [ ] Sepsis (in the previous month)                        (1 Point)                  [ ] History of pregnancy complications                 (1 point)  [ ] Pneumonia or serious lung disease                                               [ ] Unexplained or recurrent                     (1 Point)           (in the previous month)                               (1 Point)  [ ] Abnormal pulmonary function test                     (1 Point)                 SURGERY RELATED RISK FACTORS  [ ] Acute myocardial infarction                              (1 Point)                 [ ]  Section                                             (1 Point)  [ ] Congestive heart failure (in the previous month)  (1 Point)               [ ] Minor surgery                                                  (1 Point)   [ ] Inflammatory bowel disease                             (1 Point)                 [ ] Arthroscopic surgery                                        (2 Points)  [ ] Central venous access                                      (2 Points)                [X ] General surgery lasting more than 45 minutes   (2 Points)       [ ] Stroke (in the previous month)                          (5 Points)               [ ] Elective arthroplasty                                         (5 Points)                                                                                                                                               HEMATOLOGY RELATED FACTORS                                                 TRAUMA RELATED RISK FACTORS  [ ] Prior episodes of VTE                                     (3 Points)                 [ ] Fracture of the hip, pelvis, or leg                       (5 Points)  [ ] Positive family history for VTE                         (3 Points)                 [ ] Acute spinal cord injury (in the previous month)  (5 Points)  [ ] Prothrombin 07316 A                                     (3 Points)                 [ ] Paralysis  (less than 1 month)                             (5 Points)  [ ] Factor V Leiden                                             (3 Points)                  [ ] Multiple Trauma within 1 month                        (5 Points)  [ ] Lupus anticoagulants                                     (3 Points)                                                           [ ] Anticardiolipin antibodies                               (3 Points)                                                       [ ] High homocysteine in the blood                      (3 Points)                                             [ ] Other congenital or acquired thrombophilia      (3 Points)                                                [ ] Heparin induced thrombocytopenia                  (3 Points)                                          Total Score [     5     ]

## 2018-03-26 NOTE — H&P PST ADULT - NS MD HP INPLANTS MED DEV
Left knee replacement/Artificial joint Artificial joint/Left knee replacement, hardware in cervical spine

## 2018-03-26 NOTE — H&P PST ADULT - MUSCULOSKELETAL
details… detailed exam decreased ROM due to pain/diminished strength/decreased ROM/Bilateral lower extremities

## 2018-03-26 NOTE — H&P PST ADULT - SKIN COMMENTS
multiple ecchymotic areas on upper and lower extremities. Skin tear on R lower shin covered with bandaid.

## 2018-03-26 NOTE — H&P PST ADULT - NSHPATTENDINGPLANDISCUSS_GEN_ALL_CORE
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

## 2018-03-26 NOTE — H&P PST ADULT - PSH
Carpal tunnel syndrome  B/L  S/P surgery  Encounter for screening colonoscopy    H/O umbilical hernia repair    History of appendectomy    History of knee replacement, total, left    History of tonsillectomy  appendectpmy  S/P CABG x 3    S/P laminectomy  lumbar  Status post arthroscopic knee surgery  left knee x2  Status post cardiac surgery  triple by-pass surgery, cardiac stents x3 Carpal tunnel syndrome  B/L  S/P surgery  Encounter for screening colonoscopy    H/O umbilical hernia repair    History of appendectomy    History of knee replacement, total, left    History of tonsillectomy  appendectomy  S/P CABG x 3    S/P laminectomy  lumbar  Status post arthroscopic knee surgery  left knee x2  Status post cardiac surgery  triple by-pass surgery, cardiac stents x3

## 2018-03-26 NOTE — PATIENT PROFILE ADULT. - PSH
Carpal tunnel syndrome  B/L  S/P surgery  Encounter for screening colonoscopy    H/O umbilical hernia repair    History of appendectomy    History of knee replacement, total, left    History of tonsillectomy  appendectomy  S/P CABG x 3    S/P laminectomy  lumbar  Status post arthroscopic knee surgery  left knee x2  Status post cardiac surgery  triple by-pass surgery, cardiac stents x3

## 2018-03-26 NOTE — H&P PST ADULT - HISTORY OF PRESENT ILLNESS
72 year old male with a history of Diabetes, CAD s/p CABG and multiple stent placements, dyslipidemia, KEVYN, and GERD presents with a cervical spine disorder scheduled for a cervical laminectomy and posterior cervical fusion on 4/9/18.

## 2018-03-26 NOTE — H&P PST ADULT - FAMILY HISTORY
Mother  Still living? No  Family history of early CAD, Age at diagnosis: Age Unknown  Family history of CHF (congestive heart failure), Age at diagnosis: 61-70     Father  Still living? No  Family history of dementia, Age at diagnosis:   Family history of prostate cancer, Age at diagnosis: Age Unknown     Sibling  Still living? Yes, Estimated age: Age Unknown  Family history of diabetes mellitus, Age at diagnosis: Age Unknown

## 2018-03-26 NOTE — H&P PST ADULT - PROBLEM SELECTOR PLAN 1
Cervical Laminectomy and posterior fusion 4/9  Labs pending.  Medical Clearance scheduled for 3/28/18.

## 2018-03-27 DIAGNOSIS — R82.71 BACTERIURIA: ICD-10-CM

## 2018-03-27 LAB
CULTURE RESULTS: SIGNIFICANT CHANGE UP
SPECIMEN SOURCE: SIGNIFICANT CHANGE UP

## 2018-03-27 RX ORDER — LEVOFLOXACIN 500 MG/1
500 TABLET, FILM COATED ORAL
Qty: 3 | Refills: 0 | Status: ACTIVE | COMMUNITY
Start: 2018-03-27 | End: 1900-01-01

## 2018-03-27 RX ORDER — ALPRAZOLAM 2 MG/1
TABLET ORAL
Refills: 0 | Status: ACTIVE | COMMUNITY

## 2018-04-04 ENCOUNTER — APPOINTMENT (OUTPATIENT)
Dept: CT IMAGING | Facility: CLINIC | Age: 73
End: 2018-04-04
Payer: MEDICARE

## 2018-04-04 ENCOUNTER — OUTPATIENT (OUTPATIENT)
Dept: OUTPATIENT SERVICES | Facility: HOSPITAL | Age: 73
LOS: 1 days | End: 2018-04-04
Payer: MEDICARE

## 2018-04-04 DIAGNOSIS — Z95.1 PRESENCE OF AORTOCORONARY BYPASS GRAFT: Chronic | ICD-10-CM

## 2018-04-04 DIAGNOSIS — G56.00 CARPAL TUNNEL SYNDROME, UNSPECIFIED UPPER LIMB: Chronic | ICD-10-CM

## 2018-04-04 DIAGNOSIS — Z98.89 OTHER SPECIFIED POSTPROCEDURAL STATES: Chronic | ICD-10-CM

## 2018-04-04 DIAGNOSIS — Z12.11 ENCOUNTER FOR SCREENING FOR MALIGNANT NEOPLASM OF COLON: Chronic | ICD-10-CM

## 2018-04-04 DIAGNOSIS — Z96.652 PRESENCE OF LEFT ARTIFICIAL KNEE JOINT: Chronic | ICD-10-CM

## 2018-04-04 DIAGNOSIS — G95.9 DISEASE OF SPINAL CORD, UNSPECIFIED: ICD-10-CM

## 2018-04-04 DIAGNOSIS — M47.812 SPONDYLOSIS WITHOUT MYELOPATHY OR RADICULOPATHY, CERVICAL REGION: ICD-10-CM

## 2018-04-04 PROCEDURE — 72125 CT NECK SPINE W/O DYE: CPT | Mod: 26

## 2018-04-04 PROCEDURE — 72125 CT NECK SPINE W/O DYE: CPT

## 2018-04-04 PROCEDURE — 76377 3D RENDER W/INTRP POSTPROCES: CPT

## 2018-04-04 PROCEDURE — 76377 3D RENDER W/INTRP POSTPROCES: CPT | Mod: 26

## 2018-04-08 ENCOUNTER — TRANSCRIPTION ENCOUNTER (OUTPATIENT)
Age: 73
End: 2018-04-08

## 2018-04-09 ENCOUNTER — APPOINTMENT (OUTPATIENT)
Dept: ORTHOPEDIC SURGERY | Facility: HOSPITAL | Age: 73
End: 2018-04-09

## 2018-04-09 ENCOUNTER — INPATIENT (INPATIENT)
Facility: HOSPITAL | Age: 73
LOS: 2 days | Discharge: HOME | DRG: 460 | End: 2018-04-12
Attending: ORTHOPAEDIC SURGERY | Admitting: ORTHOPAEDIC SURGERY
Payer: MEDICARE

## 2018-04-09 VITALS
RESPIRATION RATE: 16 BRPM | HEART RATE: 38 BPM | SYSTOLIC BLOOD PRESSURE: 136 MMHG | DIASTOLIC BLOOD PRESSURE: 78 MMHG | OXYGEN SATURATION: 100 % | WEIGHT: 199.96 LBS | HEIGHT: 72 IN | TEMPERATURE: 98 F

## 2018-04-09 DIAGNOSIS — Z96.652 PRESENCE OF LEFT ARTIFICIAL KNEE JOINT: Chronic | ICD-10-CM

## 2018-04-09 DIAGNOSIS — G95.9 DISEASE OF SPINAL CORD, UNSPECIFIED: ICD-10-CM

## 2018-04-09 DIAGNOSIS — Z12.11 ENCOUNTER FOR SCREENING FOR MALIGNANT NEOPLASM OF COLON: Chronic | ICD-10-CM

## 2018-04-09 DIAGNOSIS — Z98.89 OTHER SPECIFIED POSTPROCEDURAL STATES: Chronic | ICD-10-CM

## 2018-04-09 DIAGNOSIS — G56.00 CARPAL TUNNEL SYNDROME, UNSPECIFIED UPPER LIMB: Chronic | ICD-10-CM

## 2018-04-09 DIAGNOSIS — Z95.1 PRESENCE OF AORTOCORONARY BYPASS GRAFT: Chronic | ICD-10-CM

## 2018-04-09 LAB
GLUCOSE BLDC GLUCOMTR-MCNC: 121 MG/DL — HIGH (ref 70–99)
GLUCOSE BLDC GLUCOMTR-MCNC: 207 MG/DL — HIGH (ref 70–99)
GLUCOSE BLDC GLUCOMTR-MCNC: 208 MG/DL — HIGH (ref 70–99)
GLUCOSE BLDC GLUCOMTR-MCNC: 238 MG/DL — HIGH (ref 70–99)
TYPE + AB SCN PNL BLD: SIGNIFICANT CHANGE UP

## 2018-04-09 PROCEDURE — 36415 COLL VENOUS BLD VENIPUNCTURE: CPT

## 2018-04-09 PROCEDURE — 85027 COMPLETE CBC AUTOMATED: CPT

## 2018-04-09 PROCEDURE — 86850 RBC ANTIBODY SCREEN: CPT

## 2018-04-09 PROCEDURE — 87086 URINE CULTURE/COLONY COUNT: CPT

## 2018-04-09 PROCEDURE — 87640 STAPH A DNA AMP PROBE: CPT

## 2018-04-09 PROCEDURE — 81003 URINALYSIS AUTO W/O SCOPE: CPT

## 2018-04-09 PROCEDURE — 80048 BASIC METABOLIC PNL TOTAL CA: CPT

## 2018-04-09 PROCEDURE — G0463: CPT

## 2018-04-09 PROCEDURE — 22614 ARTHRD PST TQ 1NTRSPC EA ADD: CPT

## 2018-04-09 PROCEDURE — 83036 HEMOGLOBIN GLYCOSYLATED A1C: CPT

## 2018-04-09 PROCEDURE — 86900 BLOOD TYPING SEROLOGIC ABO: CPT

## 2018-04-09 PROCEDURE — 85730 THROMBOPLASTIN TIME PARTIAL: CPT

## 2018-04-09 PROCEDURE — 86923 COMPATIBILITY TEST ELECTRIC: CPT

## 2018-04-09 PROCEDURE — 63045 LAM FACETEC & FORAMOT CRV: CPT

## 2018-04-09 PROCEDURE — 85610 PROTHROMBIN TIME: CPT

## 2018-04-09 PROCEDURE — 93005 ELECTROCARDIOGRAM TRACING: CPT

## 2018-04-09 PROCEDURE — 22600 ARTHRD PST TQ 1NTRSPC CRV: CPT

## 2018-04-09 PROCEDURE — 63048 LAM FACETEC &FORAMOT EA ADDL: CPT

## 2018-04-09 PROCEDURE — 86901 BLOOD TYPING SEROLOGIC RH(D): CPT

## 2018-04-09 PROCEDURE — 22843 INSERT SPINE FIXATION DEVICE: CPT

## 2018-04-09 RX ORDER — ASPIRIN/CALCIUM CARB/MAGNESIUM 324 MG
81 TABLET ORAL DAILY
Qty: 0 | Refills: 0 | Status: DISCONTINUED | OUTPATIENT
Start: 2018-04-10 | End: 2018-04-12

## 2018-04-09 RX ORDER — FENTANYL CITRATE 50 UG/ML
25 INJECTION INTRAVENOUS
Qty: 0 | Refills: 0 | Status: DISCONTINUED | OUTPATIENT
Start: 2018-04-09 | End: 2018-04-09

## 2018-04-09 RX ORDER — INSULIN LISPRO 100/ML
VIAL (ML) SUBCUTANEOUS
Qty: 0 | Refills: 0 | Status: DISCONTINUED | OUTPATIENT
Start: 2018-04-09 | End: 2018-04-12

## 2018-04-09 RX ORDER — SODIUM CHLORIDE 9 MG/ML
1000 INJECTION, SOLUTION INTRAVENOUS
Qty: 0 | Refills: 0 | Status: DISCONTINUED | OUTPATIENT
Start: 2018-04-09 | End: 2018-04-12

## 2018-04-09 RX ORDER — ATENOLOL 25 MG/1
50 TABLET ORAL DAILY
Qty: 0 | Refills: 0 | Status: DISCONTINUED | OUTPATIENT
Start: 2018-04-11 | End: 2018-04-12

## 2018-04-09 RX ORDER — DEXTROSE 50 % IN WATER 50 %
1 SYRINGE (ML) INTRAVENOUS ONCE
Qty: 0 | Refills: 0 | Status: DISCONTINUED | OUTPATIENT
Start: 2018-04-09 | End: 2018-04-12

## 2018-04-09 RX ORDER — GLUCAGON INJECTION, SOLUTION 0.5 MG/.1ML
1 INJECTION, SOLUTION SUBCUTANEOUS ONCE
Qty: 0 | Refills: 0 | Status: DISCONTINUED | OUTPATIENT
Start: 2018-04-09 | End: 2018-04-12

## 2018-04-09 RX ORDER — ALPRAZOLAM 0.25 MG
0.25 TABLET ORAL THREE TIMES A DAY
Qty: 0 | Refills: 0 | Status: DISCONTINUED | OUTPATIENT
Start: 2018-04-09 | End: 2018-04-12

## 2018-04-09 RX ORDER — MAGNESIUM HYDROXIDE 400 MG/1
30 TABLET, CHEWABLE ORAL EVERY 12 HOURS
Qty: 0 | Refills: 0 | Status: DISCONTINUED | OUTPATIENT
Start: 2018-04-09 | End: 2018-04-12

## 2018-04-09 RX ORDER — SENNA PLUS 8.6 MG/1
2 TABLET ORAL AT BEDTIME
Qty: 0 | Refills: 0 | Status: DISCONTINUED | OUTPATIENT
Start: 2018-04-09 | End: 2018-04-12

## 2018-04-09 RX ORDER — ONDANSETRON 8 MG/1
4 TABLET, FILM COATED ORAL ONCE
Qty: 0 | Refills: 0 | Status: DISCONTINUED | OUTPATIENT
Start: 2018-04-09 | End: 2018-04-09

## 2018-04-09 RX ORDER — ALBUTEROL 90 UG/1
2.5 AEROSOL, METERED ORAL EVERY 6 HOURS
Qty: 0 | Refills: 0 | Status: DISCONTINUED | OUTPATIENT
Start: 2018-04-09 | End: 2018-04-12

## 2018-04-09 RX ORDER — BACITRACIN ZINC 500 UNIT/G
1 OINTMENT IN PACKET (EA) TOPICAL
Qty: 0 | Refills: 0 | Status: DISCONTINUED | OUTPATIENT
Start: 2018-04-09 | End: 2018-04-12

## 2018-04-09 RX ORDER — ATORVASTATIN CALCIUM 80 MG/1
40 TABLET, FILM COATED ORAL AT BEDTIME
Qty: 0 | Refills: 0 | Status: DISCONTINUED | OUTPATIENT
Start: 2018-04-09 | End: 2018-04-12

## 2018-04-09 RX ORDER — OXYCODONE HYDROCHLORIDE 5 MG/1
5 TABLET ORAL EVERY 4 HOURS
Qty: 0 | Refills: 0 | Status: DISCONTINUED | OUTPATIENT
Start: 2018-04-09 | End: 2018-04-12

## 2018-04-09 RX ORDER — TAMSULOSIN HYDROCHLORIDE 0.4 MG/1
0.4 CAPSULE ORAL AT BEDTIME
Qty: 0 | Refills: 0 | Status: DISCONTINUED | OUTPATIENT
Start: 2018-04-09 | End: 2018-04-12

## 2018-04-09 RX ORDER — SODIUM CHLORIDE 9 MG/ML
3 INJECTION INTRAMUSCULAR; INTRAVENOUS; SUBCUTANEOUS EVERY 8 HOURS
Qty: 0 | Refills: 0 | Status: DISCONTINUED | OUTPATIENT
Start: 2018-04-09 | End: 2018-04-09

## 2018-04-09 RX ORDER — PANTOPRAZOLE SODIUM 20 MG/1
40 TABLET, DELAYED RELEASE ORAL
Qty: 0 | Refills: 0 | Status: DISCONTINUED | OUTPATIENT
Start: 2018-04-09 | End: 2018-04-12

## 2018-04-09 RX ORDER — GABAPENTIN 400 MG/1
300 CAPSULE ORAL
Qty: 0 | Refills: 0 | Status: DISCONTINUED | OUTPATIENT
Start: 2018-04-09 | End: 2018-04-12

## 2018-04-09 RX ORDER — SODIUM CHLORIDE 9 MG/ML
1000 INJECTION, SOLUTION INTRAVENOUS
Qty: 0 | Refills: 0 | Status: DISCONTINUED | OUTPATIENT
Start: 2018-04-09 | End: 2018-04-09

## 2018-04-09 RX ORDER — BUPIVACAINE 13.3 MG/ML
20 INJECTION, SUSPENSION, LIPOSOMAL INFILTRATION ONCE
Qty: 0 | Refills: 0 | Status: DISCONTINUED | OUTPATIENT
Start: 2018-04-09 | End: 2018-04-09

## 2018-04-09 RX ORDER — MOMETASONE FUROATE 220 UG/1
2 INHALANT RESPIRATORY (INHALATION)
Qty: 0 | Refills: 0 | Status: DISCONTINUED | OUTPATIENT
Start: 2018-04-09 | End: 2018-04-12

## 2018-04-09 RX ORDER — DEXAMETHASONE 0.5 MG/5ML
4 ELIXIR ORAL EVERY 6 HOURS
Qty: 0 | Refills: 0 | Status: COMPLETED | OUTPATIENT
Start: 2018-04-09 | End: 2018-04-11

## 2018-04-09 RX ORDER — DEXTROSE 50 % IN WATER 50 %
25 SYRINGE (ML) INTRAVENOUS ONCE
Qty: 0 | Refills: 0 | Status: DISCONTINUED | OUTPATIENT
Start: 2018-04-09 | End: 2018-04-12

## 2018-04-09 RX ORDER — METHOCARBAMOL 500 MG/1
500 TABLET, FILM COATED ORAL EVERY 6 HOURS
Qty: 0 | Refills: 0 | Status: DISCONTINUED | OUTPATIENT
Start: 2018-04-09 | End: 2018-04-12

## 2018-04-09 RX ORDER — ACETAMINOPHEN 500 MG
975 TABLET ORAL EVERY 6 HOURS
Qty: 0 | Refills: 0 | Status: DISCONTINUED | OUTPATIENT
Start: 2018-04-09 | End: 2018-04-12

## 2018-04-09 RX ORDER — ENOXAPARIN SODIUM 100 MG/ML
40 INJECTION SUBCUTANEOUS EVERY 24 HOURS
Qty: 0 | Refills: 0 | Status: DISCONTINUED | OUTPATIENT
Start: 2018-04-10 | End: 2018-04-12

## 2018-04-09 RX ORDER — DEXTROSE 50 % IN WATER 50 %
12.5 SYRINGE (ML) INTRAVENOUS ONCE
Qty: 0 | Refills: 0 | Status: DISCONTINUED | OUTPATIENT
Start: 2018-04-09 | End: 2018-04-12

## 2018-04-09 RX ORDER — ONDANSETRON 8 MG/1
4 TABLET, FILM COATED ORAL EVERY 6 HOURS
Qty: 0 | Refills: 0 | Status: DISCONTINUED | OUTPATIENT
Start: 2018-04-09 | End: 2018-04-12

## 2018-04-09 RX ORDER — OXYCODONE HYDROCHLORIDE 5 MG/1
10 TABLET ORAL EVERY 4 HOURS
Qty: 0 | Refills: 0 | Status: DISCONTINUED | OUTPATIENT
Start: 2018-04-09 | End: 2018-04-12

## 2018-04-09 RX ORDER — VANCOMYCIN HCL 1 G
1000 VIAL (EA) INTRAVENOUS
Qty: 0 | Refills: 0 | Status: COMPLETED | OUTPATIENT
Start: 2018-04-09 | End: 2018-04-09

## 2018-04-09 RX ORDER — VANCOMYCIN HCL 1 G
1250 VIAL (EA) INTRAVENOUS ONCE
Qty: 0 | Refills: 0 | Status: COMPLETED | OUTPATIENT
Start: 2018-04-09 | End: 2018-04-09

## 2018-04-09 RX ORDER — NITROGLYCERIN 6.5 MG
1 CAPSULE, EXTENDED RELEASE ORAL DAILY
Qty: 0 | Refills: 0 | Status: DISCONTINUED | OUTPATIENT
Start: 2018-04-09 | End: 2018-04-12

## 2018-04-09 RX ORDER — MOMETASONE FUROATE 220 UG/1
2 INHALANT RESPIRATORY (INHALATION)
Qty: 0 | Refills: 0 | Status: DISCONTINUED | OUTPATIENT
Start: 2018-04-09 | End: 2018-04-09

## 2018-04-09 RX ADMIN — SODIUM CHLORIDE 80 MILLILITER(S): 9 INJECTION, SOLUTION INTRAVENOUS at 17:20

## 2018-04-09 RX ADMIN — Medication 0.25 MILLIGRAM(S): at 23:30

## 2018-04-09 RX ADMIN — OXYCODONE HYDROCHLORIDE 10 MILLIGRAM(S): 5 TABLET ORAL at 22:59

## 2018-04-09 RX ADMIN — GABAPENTIN 300 MILLIGRAM(S): 400 CAPSULE ORAL at 17:19

## 2018-04-09 RX ADMIN — OXYCODONE HYDROCHLORIDE 10 MILLIGRAM(S): 5 TABLET ORAL at 17:16

## 2018-04-09 RX ADMIN — Medication 1 APPLICATION(S): at 17:16

## 2018-04-09 RX ADMIN — SENNA PLUS 2 TABLET(S): 8.6 TABLET ORAL at 21:51

## 2018-04-09 RX ADMIN — ATORVASTATIN CALCIUM 40 MILLIGRAM(S): 80 TABLET, FILM COATED ORAL at 21:51

## 2018-04-09 RX ADMIN — Medication 3: at 17:16

## 2018-04-09 RX ADMIN — OXYCODONE HYDROCHLORIDE 10 MILLIGRAM(S): 5 TABLET ORAL at 21:59

## 2018-04-09 RX ADMIN — Medication 4 MILLIGRAM(S): at 23:30

## 2018-04-09 RX ADMIN — TAMSULOSIN HYDROCHLORIDE 0.4 MILLIGRAM(S): 0.4 CAPSULE ORAL at 21:51

## 2018-04-09 RX ADMIN — MOMETASONE FUROATE 2 PUFF(S): 220 INHALANT RESPIRATORY (INHALATION) at 23:30

## 2018-04-09 RX ADMIN — Medication 4 MILLIGRAM(S): at 17:16

## 2018-04-09 RX ADMIN — Medication 250 MILLIGRAM(S): at 18:30

## 2018-04-09 RX ADMIN — Medication 166.67 MILLIGRAM(S): at 07:06

## 2018-04-09 RX ADMIN — OXYCODONE HYDROCHLORIDE 10 MILLIGRAM(S): 5 TABLET ORAL at 18:15

## 2018-04-09 NOTE — BRIEF OPERATIVE NOTE - PROCEDURE
<<-----Click on this checkbox to enter Procedure Cervical fusion, posterior technique  04/09/2018    Active  DBRANDENSTEIN  Cervical laminectomy  04/09/2018    Active  DBRANDENSTEIN

## 2018-04-09 NOTE — PROGRESS NOTE ADULT - SUBJECTIVE AND OBJECTIVE BOX
ALESSIA [facility]  [id number]  73yMale    STATUS POST: posterior cervical laminectomy C4, C5, C6, posterior instrumented fusionC3-T1 for cervical stenosis with myelopathy, right lower extremity weakness, gait disturbance.  Disease of spinal cord  No h/o HF  Family history of diabetes mellitus (Sibling)  Family history of prostate cancer  Family history of dementia  Family history of CHF (congestive heart failure)  Family history of early CAD  Handoff  Anemia  Ventricular tachycardia  Reactive airway disease  Back pain  Sleep apnea  Tachycardia  Carpal tunnel syndrome, bilateral  DJD (degenerative joint disease)  Umbilical hernia  High cholesterol  CAD (coronary artery disease)  Diabetes  Hypertension  Cervical myelopathy with cervical radiculopathy  Cervical myelopathy with cervical radiculopathy  Need for prophylactic measure  Diabetes  CAD (coronary artery disease)  DJD (degenerative joint disease)  Cervical laminectomy  Cervical fusion, posterior technique  History of knee replacement, total, left  S/P CABG x 3  H/O left knee surgery  H/O umbilical hernia repair  Carpal tunnel syndrome  Status post cardiac surgery  S/P laminectomy  Status post arthroscopic knee surgery  History of appendectomy  History of tonsillectomy  Encounter for screening colonoscopy  DISEASE OF SPINAL CORD, UNSPEC      SUBJECTIVE: Patient seen and examined doing well  Pain controlled, positive posterior neck pain as expected     OBJECTIVE:   T(C): 36.4 (04-09-18 @ 06:00), Max: 36.4 (04-09-18 @ 06:00)  HR: 38 (04-09-18 @ 06:00) (38 - 38)  BP: 136/78 (04-09-18 @ 06:00) (136/78 - 136/78)  RR: 16 (04-09-18 @ 06:00) (16 - 16)  SpO2: 100% (04-09-18 @ 06:00) (100% - 100%)  Constitutional: Pleasant in no acute distress  Psych:A&Ox3  HEENT: gray point insertion sites clean, dry  Abdominal: soft and supple non distended  Lymphatics: no pretibial pitting edema  Spine:          Dressing:  clean/dry/intact, HV patent               Sensation:         [x ] Upper extremity         stocking distribution paresthesia bilaterally manually         [ x] Lower extremity           stocking distribution paresthesia bilaterally manually                               Motor:                  [ x] left Lower and upper extremity grossly intact manually, positive posterior cervicalgia as expected.  Right lower extremity 4/5 manually EHL, dorsi/plantarflexion, hip flexor, knee extension            Vascular:[x] warm well perfused; capillary refill <2 seconds, hairless, consistent with peripheral vascular disease               A/P :73y MaleS/P posterior cervical laminectomy and fusion as above  POD#0  -    Pain control  -    DVT ppx: [ x]SCDs, early ambulation,   asa 81 mg daily restart tomorrow and continue.  lovenox 40 mg daily until ambulating well.   -    Periop abx:  vanco      -    Likely 48-72 hours admission then acute vs CARMELA, acute rehab eval called  -    Monitor Drain Output, can discontinue drain when output equal or less than 10 cc/hr/12 hr.  change dressing when drain pulled   -    Resume home meds- simvastatin, alprazalam. Atenolol restart 4/11 with parameters or earlier as deemed by Medicine Service.  bumetamide on hold perioperatively, will resteart as deemed ready by medicine team.  Oral antiglycemics on hold.  Patient on diabetic diet, sliding scale insulin coverage.    -PT/OT WBAT, balance and gait  -brace cervical collar with OOB is for comfort and not mandatory but should be encouraged, never to be worn in bed, -medical follow up   - Dr Ricci will follow for medical post op.  flomax start tonight to avoid urinary retention.  pull engle tomorrow. ALESSIA [facility]  [id number]  73yMale    STATUS POST: posterior cervical laminectomy C4, C5, C6, posterior instrumented fusionC3-T1 for cervical stenosis with myelopathy, right lower extremity weakness, gait disturbance.  Disease of spinal cord  No h/o HF  Family history of diabetes mellitus (Sibling)  Family history of prostate cancer  Family history of dementia  Family history of CHF (congestive heart failure)  Family history of early CAD  Handoff  Anemia  Ventricular tachycardia  Reactive airway disease  Back pain  Sleep apnea  Tachycardia  Carpal tunnel syndrome, bilateral  DJD (degenerative joint disease)  Umbilical hernia  High cholesterol  CAD (coronary artery disease)  Diabetes  Hypertension  Cervical myelopathy with cervical radiculopathy  Cervical myelopathy with cervical radiculopathy  Need for prophylactic measure  Diabetes  CAD (coronary artery disease)  DJD (degenerative joint disease)  Cervical laminectomy  Cervical fusion, posterior technique  History of knee replacement, total, left  S/P CABG x 3  H/O left knee surgery  H/O umbilical hernia repair  Carpal tunnel syndrome  Status post cardiac surgery  S/P laminectomy  Status post arthroscopic knee surgery  History of appendectomy  History of tonsillectomy  Encounter for screening colonoscopy  DISEASE OF SPINAL CORD, UNSPEC      SUBJECTIVE: Patient seen and examined doing well  Pain controlled, positive posterior neck pain as expected     OBJECTIVE:   T(C): 36.4 (04-09-18 @ 06:00), Max: 36.4 (04-09-18 @ 06:00)  HR: 38 (04-09-18 @ 06:00) (38 - 38)  BP: 136/78 (04-09-18 @ 06:00) (136/78 - 136/78)  RR: 16 (04-09-18 @ 06:00) (16 - 16)  SpO2: 100% (04-09-18 @ 06:00) (100% - 100%)  Constitutional: Pleasant in no acute distress  Psych:A&Ox3  HEENT: gray point insertion sites clean, dry  Abdominal: soft and supple non distended  Lymphatics: no pretibial pitting edema  Spine:          Dressing:  clean/dry/intact, HV patent               Sensation:         [x ] Upper extremity         stocking distribution paresthesia bilaterally manually         [ x] Lower extremity           stocking distribution paresthesia bilaterally manually                               Motor:                  [ x] left Lower and upper extremity grossly intact manually, positive posterior cervicalgia as expected.  Right lower extremity 4/5 manually EHL, dorsi/plantarflexion, hip flexor, knee extension            Vascular:[x] warm well perfused; capillary refill <2 seconds, hairless, consistent with peripheral vascular disease               A/P :73y MaleS/P posterior cervical laminectomy and fusion as above  POD#0  -    Pain control  -    DVT ppx: [ x]SCDs, early ambulation,   asa 81 mg daily restart tomorrow and continue.  lovenox 40 mg daily until ambulating well.   -    Periop abx:  vanco      -    Likely 48-72 hours admission then acute vs CARMELA, acute rehab eval called  -    Monitor Drain Output, can discontinue drain when output equal or less than 10 cc/hr/12 hr.  change dressing when drain pulled   -    Resume home meds- simvastatin, alprazalam. Atenolol restart 4/11 with parameters or earlier as deemed by Medicine Service.  bumetamide on hold perioperatively, will resteart as deemed ready by medicine team.  Oral antiglycemics on hold.  Patient on diabetic diet, sliding scale insulin coverage.    -PT/OT WBAT, balance and gait, OOB with assistance as patient is myelopathic, has gait disturbance  -brace cervical collar with OOB is for comfort and not mandatory but should be encouraged, never to be worn in bed, -medical follow up   - Dr Ricci will follow for medical post op.  flomax start tonight to avoid urinary retention.  pull engle tomorrow. ALESSIA [facility]  [id number]  73yMale    STATUS POST: posterior cervical laminectomy C4, C5, C6, posterior instrumented fusionC3-T1 for cervical stenosis with myelopathy, right lower extremity weakness, gait disturbance.  Disease of spinal cord  No h/o HF  Family history of diabetes mellitus (Sibling)  Family history of prostate cancer  Family history of dementia  Family history of CHF (congestive heart failure)  Family history of early CAD  Handoff  Anemia  Ventricular tachycardia  Reactive airway disease  Back pain  Sleep apnea  Tachycardia  Carpal tunnel syndrome, bilateral  DJD (degenerative joint disease)  Umbilical hernia  High cholesterol  CAD (coronary artery disease)  Diabetes  Hypertension  Cervical myelopathy with cervical radiculopathy  Cervical myelopathy with cervical radiculopathy  Need for prophylactic measure  Diabetes  CAD (coronary artery disease)  DJD (degenerative joint disease)  Cervical laminectomy  Cervical fusion, posterior technique  History of knee replacement, total, left  S/P CABG x 3  H/O left knee surgery  H/O umbilical hernia repair  Carpal tunnel syndrome  Status post cardiac surgery  S/P laminectomy  Status post arthroscopic knee surgery  History of appendectomy  History of tonsillectomy  Encounter for screening colonoscopy  DISEASE OF SPINAL CORD, UNSPEC      SUBJECTIVE: Patient seen and examined doing well  Pain controlled, positive posterior neck pain as expected     OBJECTIVE:   T(C): 36.4 (04-09-18 @ 06:00), Max: 36.4 (04-09-18 @ 06:00)  HR: 38 (04-09-18 @ 06:00) (38 - 38)  BP: 136/78 (04-09-18 @ 06:00) (136/78 - 136/78)  RR: 16 (04-09-18 @ 06:00) (16 - 16)  SpO2: 100% (04-09-18 @ 06:00) (100% - 100%)  Constitutional: Pleasant in no acute distress  Psych:A&Ox3  HEENT: gray point insertion sites clean, dry  Abdominal: soft and supple non distended  Lymphatics: no pretibial pitting edema  Spine:          Dressing:  clean/dry/intact, HV patent               Sensation:         [x ] Upper extremity         stocking distribution paresthesia bilaterally manually         [ x] Lower extremity           stocking distribution paresthesia bilaterally manually                               Motor:                  [ x] bilateral Lower extremity EHL, dorsi/plantarflexion and upper extremity grossly intact manually, Bilateral lower extremity hip flexor, knee extension 4/5 manually            Vascular:[x] warm well perfused; capillary refill <2 seconds, hairless, consistent with peripheral vascular disease               A/P :73y MaleS/P posterior cervical laminectomy and fusion as above  POD#0  -    Pain control  -    DVT ppx: [ x]SCDs, early ambulation,   asa 81 mg daily restart tomorrow and continue.  lovenox 40 mg daily until ambulating well.   -    Periop abx:  vanco      -    Likely 48-72 hours admission then acute vs CARMELA, acute rehab eval called  -    Monitor Drain Output, can discontinue drain when output equal or less than 10 cc/hr/12 hr.  change dressing when drain pulled   -    Resume home meds- simvastatin, alprazalam. Atenolol restart 4/11 with parameters or earlier as deemed by Medicine Service.  bumetamide on hold perioperatively, will resteart as deemed ready by medicine team.  Oral antiglycemics on hold.  Patient on diabetic diet, sliding scale insulin coverage.    -PT/OT WBAT, balance and gait, OOB with assistance as patient is myelopathic, has gait disturbance  -brace cervical collar with OOB is for comfort and not mandatory but should be encouraged, never to be worn in bed, -medical follow up   - Dr Ricci will follow for medical post op.  flomax start tonight to avoid urinary retention.  pull engle tomorrow. ALESSIA [facility]  [id number]  73yMale    STATUS POST: posterior cervical laminectomy C4, C5, C6, posterior instrumented fusionC3-T1 for cervical stenosis with myelopathy, right lower extremity weakness, gait disturbance.  Disease of spinal cord  No h/o HF  Family history of diabetes mellitus (Sibling)  Family history of prostate cancer  Family history of dementia  Family history of CHF (congestive heart failure)  Family history of early CAD  Handoff  Anemia  Ventricular tachycardia  Reactive airway disease  Back pain  Sleep apnea  Tachycardia  Carpal tunnel syndrome, bilateral  DJD (degenerative joint disease)  Umbilical hernia  High cholesterol  CAD (coronary artery disease)  Diabetes  Hypertension  Cervical myelopathy with cervical radiculopathy  Cervical myelopathy with cervical radiculopathy  Need for prophylactic measure  Diabetes  CAD (coronary artery disease)  DJD (degenerative joint disease)  Cervical laminectomy  Cervical fusion, posterior technique  History of knee replacement, total, left  S/P CABG x 3  H/O left knee surgery  H/O umbilical hernia repair  Carpal tunnel syndrome  Status post cardiac surgery  S/P laminectomy  Status post arthroscopic knee surgery  History of appendectomy  History of tonsillectomy  Encounter for screening colonoscopy  DISEASE OF SPINAL CORD, UNSPEC      SUBJECTIVE: Patient seen and examined doing well  Pain controlled, positive posterior neck pain as expected     OBJECTIVE:   T(C): 36.4 (04-09-18 @ 06:00), Max: 36.4 (04-09-18 @ 06:00)  HR: 38 (04-09-18 @ 06:00) (38 - 38)  BP: 136/78 (04-09-18 @ 06:00) (136/78 - 136/78)  RR: 16 (04-09-18 @ 06:00) (16 - 16)  SpO2: 100% (04-09-18 @ 06:00) (100% - 100%)  Constitutional: Pleasant in no acute distress  Psych:A&Ox3  HEENT: gray point insertion sites clean, dry  Abdominal: soft and supple non distended  Lymphatics: no pretibial pitting edema  Spine:          Dressing:  clean/dry/intact, HV patent               Sensation:         [x ] Upper extremity         stocking distribution paresthesia bilaterally manually         [ x] Lower extremity           stocking distribution paresthesia bilaterally manually                               Motor:                  [ x] bilateral Lower extremity EHL, dorsi/plantarflexion and upper extremity grossly intact manually, Bilateral lower extremity hip flexor, knee extension 4/5 manually            Vascular:[x] warm well perfused; capillary refill <2 seconds, hairless, consistent with peripheral vascular disease               A/P :73y MaleS/P posterior cervical laminectomy and fusion as above  POD#0  -    Pain control  -    DVT ppx: [ x]SCDs, early ambulation,   asa 81 mg daily restart tomorrow and continue.  lovenox 40 mg daily until ambulating well.   -    Periop abx:  vanco      -    Likely 48-72 hours admission then acute vs CARMELA, acute rehab eval called  -    Monitor Drain Output, can discontinue drain when output equal or less than 10 cc/hr/12 hr.  change dressing when drain pulled   -    Resume home meds- simvastatin, alprazalam. Atenolol restart 4/11 with parameters or earlier as deemed by Medicine Service.  bumetamide on hold perioperatively, will resteart as deemed ready by medicine team.  Oral antiglycemics on hold.  Patient on diabetic diet, sliding scale insulin coverage.   - Hx asthma- albuterol neb prn, asmanex 2 puff bid.   -PT/OT WBAT, balance and gait, OOB with assistance as patient is myelopathic, has gait disturbance  -brace cervical collar with OOB is for comfort and not mandatory but should be encouraged, never to be worn in bed, -medical follow up   - Dr Ricci will follow for medical post op.  flomax start tonight to avoid urinary retention.  pull engle tomorrow.

## 2018-04-09 NOTE — CONSULT NOTE ADULT - SUBJECTIVE AND OBJECTIVE BOX
HPI:  72 year old male with a history of Diabetes, CAD s/p CABG and multiple stent placements, dyslipidemia, KEVYN, and GERD presents with a cervical spine disorder scheduled for a cervical laminectomy and posterior cervical fusion on 4/9/18. (26 Mar 2018 15:27)  Post Op now    Home Medications:   · 	gabapentin 100 mg oral tablet: 3 tab(s) orally once a day (at bedtime)  · 	potassium chloride 10 mEq oral tablet, extended release: 1 tab(s) orally once a day  · 	aspirin 81 mg oral tablet, chewable: 1 tab(s) orally once a day  · 	bumetanide 1 mg oral tablet: 1 tab(s) orally once a day  · 	magnesium oxide 400 mg (241.3 mg elemental magnesium) oral tablet: 1 tab(s) orally once a day  · 	Asmanex HFA: 220 microgram(s) inhaled 2 times a day  · 	simvastatin 80 mg oral tablet: 1 tab(s) orally once a day (at bedtime)  · 	ferrous sulfate: 65 milligram(s) orally once a day  · 	nitroglycerin 0.3 mg/hr transdermal film, extended release: 1 patch transdermal once a day  · 	glipiZIDE 5 mg oral tablet, extended release: 1 tab(s) orally once a day  · 	metFORMIN 500 mg oral tablet, extended release: 1 tab(s) orally once a day  · 	atenolol 50 mg oral tablet: 1 tab(s) orally once a day  · 	omeprazole 40 mg oral delayed release capsule: 1 cap(s) orally once a day      PAST MEDICAL & SURGICAL HISTORY:  Anemia  Ventricular tachycardia  Reactive airway disease  Back pain: s/p lumbar lami  Sleep apnea: Intolerant of CPAP  Carpal tunnel syndrome, bilateral: s/p surgery  DJD (degenerative joint disease)  Umbilical hernia  High cholesterol  CAD (coronary artery disease): with stent placement  Diabetes  History of knee replacement, total, left  S/P CABG x 3  H/O umbilical hernia repair  Carpal tunnel syndrome: B/L  S/P surgery  Status post cardiac surgery: triple by-pass surgery, cardiac stents x3  S/P laminectomy: lumbar  Status post arthroscopic knee surgery: left knee x2  History of appendectomy  History of tonsillectomy: appendectomy  Encounter for screening colonoscopy    acetaminophen   Tablet. 975 milliGRAM(s) Oral every 6 hours PRN  ALBUTerol   0.5% 2.5 milliGRAM(s) Nebulizer every 6 hours PRN  ALPRAZolam 0.25 milliGRAM(s) Oral three times a day  aluminum hydroxide/magnesium hydroxide/simethicone Suspension 30 milliLiter(s) Oral every 12 hours PRN  atorvastatin 40 milliGRAM(s) Oral at bedtime  BACItracin   Ointment 1 Application(s) Topical two times a day  dexamethasone     Tablet 4 milliGRAM(s) Oral every 6 hours  dextrose 5%. 1000 milliLiter(s) IV Continuous <Continuous>  dextrose 50% Injectable 12.5 Gram(s) IV Push once  dextrose 50% Injectable 25 Gram(s) IV Push once  dextrose 50% Injectable 25 Gram(s) IV Push once  dextrose Gel 1 Dose(s) Oral once PRN  gabapentin 300 milliGRAM(s) Oral two times a day  glucagon  Injectable 1 milliGRAM(s) IntraMuscular once PRN  insulin lispro (HumaLOG) corrective regimen sliding scale   SubCutaneous three times a day before meals  magnesium hydroxide Suspension 30 milliLiter(s) Oral every 12 hours PRN  methocarbamol 500 milliGRAM(s) Oral every 6 hours PRN  mometasone 220 MICROgram(s) Inhaler 2 Puff(s) Inhalation two times a day  nitroglycerin    Patch 0.3 mG/Hr(s) 1 patch Transdermal daily  ondansetron Injectable 4 milliGRAM(s) IV Push every 6 hours PRN  oxyCODONE    IR 5 milliGRAM(s) Oral every 4 hours PRN  oxyCODONE    IR 10 milliGRAM(s) Oral every 4 hours PRN  pantoprazole    Tablet 40 milliGRAM(s) Oral before breakfast  senna 2 Tablet(s) Oral at bedtime  sodium chloride 0.45%. 1000 milliLiter(s) IV Continuous <Continuous>  tamsulosin 0.4 milliGRAM(s) Oral at bedtime    MEDICATIONS  (STANDING):  ALPRAZolam 0.25 milliGRAM(s) Oral three times a day  atorvastatin 40 milliGRAM(s) Oral at bedtime  BACItracin   Ointment 1 Application(s) Topical two times a day  dexamethasone     Tablet 4 milliGRAM(s) Oral every 6 hours  dextrose 5%. 1000 milliLiter(s) (50 mL/Hr) IV Continuous <Continuous>  dextrose 50% Injectable 12.5 Gram(s) IV Push once  dextrose 50% Injectable 25 Gram(s) IV Push once  dextrose 50% Injectable 25 Gram(s) IV Push once  gabapentin 300 milliGRAM(s) Oral two times a day  insulin lispro (HumaLOG) corrective regimen sliding scale   SubCutaneous three times a day before meals  mometasone 220 MICROgram(s) Inhaler 2 Puff(s) Inhalation two times a day  nitroglycerin    Patch 0.3 mG/Hr(s) 1 patch Transdermal daily  pantoprazole    Tablet 40 milliGRAM(s) Oral before breakfast  senna 2 Tablet(s) Oral at bedtime  sodium chloride 0.45%. 1000 milliLiter(s) (80 mL/Hr) IV Continuous <Continuous>  tamsulosin 0.4 milliGRAM(s) Oral at bedtime    MEDICATIONS  (PRN):  acetaminophen   Tablet. 975 milliGRAM(s) Oral every 6 hours PRN Mild Pain (1 - 3) or temp 100.3f or above  ALBUTerol   0.5% 2.5 milliGRAM(s) Nebulizer every 6 hours PRN Shortness of Breath and/or Wheezing  aluminum hydroxide/magnesium hydroxide/simethicone Suspension 30 milliLiter(s) Oral every 12 hours PRN Indigestion  dextrose Gel 1 Dose(s) Oral once PRN Blood Glucose LESS THAN 70 milliGRAM(s)/deciliter  glucagon  Injectable 1 milliGRAM(s) IntraMuscular once PRN Glucose LESS THAN 70 milligrams/deciliter  magnesium hydroxide Suspension 30 milliLiter(s) Oral every 12 hours PRN Constipation  methocarbamol 500 milliGRAM(s) Oral every 6 hours PRN Back Spasms  ondansetron Injectable 4 milliGRAM(s) IV Push every 6 hours PRN Nausea  oxyCODONE    IR 5 milliGRAM(s) Oral every 4 hours PRN Moderate Pain (4 - 6)  oxyCODONE    IR 10 milliGRAM(s) Oral every 4 hours PRN Severe Pain (7 - 10)      Allergies    penicillin (Other)    Intolerances        SOCIAL HISTORY:    FAMILY HISTORY:  Family history of diabetes mellitus (Sibling)  Family history of prostate cancer  Family history of dementia  Family history of CHF (congestive heart failure)  Family history of early CAD          ROS  - Headache  - Neck Stiffness  - Chest Pain  - SOB  - Abd pain  - Pelvic Pain  - Leg Pain      Vital Signs Last 24 Hrs  T(C): 37.2 (09 Apr 2018 16:52), Max: 37.2 (09 Apr 2018 12:10)  T(F): 99 (09 Apr 2018 16:52), Max: 99 (09 Apr 2018 12:10)  HR: 77 (09 Apr 2018 16:52) (38 - 78)  BP: 124/51 (09 Apr 2018 16:52) (100/44 - 138/51)  BP(mean): --  RR: 20 (09 Apr 2018 16:52) (9 - 20)  SpO2: 97% (09 Apr 2018 16:52) (97% - 100%)      HEENT: PEARLA  Neck: Supple Incision clean  Cardio: S1 S2 SE Murmur  Pulm: Good Air entry No Rales Occ Ronchi  Abd: Soft NT ND BS+  Rectal - refused  Ext: No DCT  Skin: No Rash  Neuro: Awake Pleasant      Cervical Surgery - post op pain control  Anemia - follow up Hb  Ventricular tachycardia - -atenolol  Asthma -- cont meds add prn nebs  Sleep apnea: Intolerant of CPAP  CAD - restart ASA once surgically acceptable  Diabetes - SS and Lantus

## 2018-04-10 ENCOUNTER — TRANSCRIPTION ENCOUNTER (OUTPATIENT)
Age: 73
End: 2018-04-10

## 2018-04-10 LAB
ANION GAP SERPL CALC-SCNC: 12 MMOL/L — SIGNIFICANT CHANGE UP (ref 5–17)
BACTERIA UR CULT: NORMAL
BUN SERPL-MCNC: 37 MG/DL — HIGH (ref 8–20)
CALCIUM SERPL-MCNC: 8.5 MG/DL — LOW (ref 8.6–10.2)
CHLORIDE SERPL-SCNC: 100 MMOL/L — SIGNIFICANT CHANGE UP (ref 98–107)
CO2 SERPL-SCNC: 26 MMOL/L — SIGNIFICANT CHANGE UP (ref 22–29)
CREAT SERPL-MCNC: 1.09 MG/DL — SIGNIFICANT CHANGE UP (ref 0.5–1.3)
EOSINOPHIL # BLD AUTO: 0 K/UL — SIGNIFICANT CHANGE UP (ref 0–0.5)
EOSINOPHIL NFR BLD AUTO: 0 % — SIGNIFICANT CHANGE UP (ref 0–5)
GLUCOSE BLDC GLUCOMTR-MCNC: 206 MG/DL — HIGH (ref 70–99)
GLUCOSE BLDC GLUCOMTR-MCNC: 238 MG/DL — HIGH (ref 70–99)
GLUCOSE BLDC GLUCOMTR-MCNC: 271 MG/DL — HIGH (ref 70–99)
GLUCOSE BLDC GLUCOMTR-MCNC: 365 MG/DL — HIGH (ref 70–99)
GLUCOSE SERPL-MCNC: 190 MG/DL — HIGH (ref 70–115)
HCT VFR BLD CALC: 24.9 % — LOW (ref 42–52)
HGB BLD-MCNC: 8.1 G/DL — LOW (ref 14–18)
LYMPHOCYTES # BLD AUTO: 0.6 K/UL — LOW (ref 1–4.8)
LYMPHOCYTES # BLD AUTO: 4.5 % — LOW (ref 20–55)
MCHC RBC-ENTMCNC: 30.1 PG — SIGNIFICANT CHANGE UP (ref 27–31)
MCHC RBC-ENTMCNC: 32.5 G/DL — SIGNIFICANT CHANGE UP (ref 32–36)
MCV RBC AUTO: 92.6 FL — SIGNIFICANT CHANGE UP (ref 80–94)
MONOCYTES # BLD AUTO: 0.9 K/UL — HIGH (ref 0–0.8)
MONOCYTES NFR BLD AUTO: 6.9 % — SIGNIFICANT CHANGE UP (ref 3–10)
NEUTROPHILS # BLD AUTO: 11.1 K/UL — HIGH (ref 1.8–8)
NEUTROPHILS NFR BLD AUTO: 88.4 % — HIGH (ref 37–73)
PLATELET # BLD AUTO: 175 K/UL — SIGNIFICANT CHANGE UP (ref 150–400)
POTASSIUM SERPL-MCNC: 4.7 MMOL/L — SIGNIFICANT CHANGE UP (ref 3.5–5.3)
POTASSIUM SERPL-SCNC: 4.7 MMOL/L — SIGNIFICANT CHANGE UP (ref 3.5–5.3)
RBC # BLD: 2.69 M/UL — LOW (ref 4.6–6.2)
RBC # FLD: 13.3 % — SIGNIFICANT CHANGE UP (ref 11–15.6)
SODIUM SERPL-SCNC: 138 MMOL/L — SIGNIFICANT CHANGE UP (ref 135–145)
WBC # BLD: 12.5 K/UL — HIGH (ref 4.8–10.8)
WBC # FLD AUTO: 12.5 K/UL — HIGH (ref 4.8–10.8)

## 2018-04-10 PROCEDURE — 99222 1ST HOSP IP/OBS MODERATE 55: CPT | Mod: GC

## 2018-04-10 RX ORDER — INSULIN GLARGINE 100 [IU]/ML
30 INJECTION, SOLUTION SUBCUTANEOUS AT BEDTIME
Qty: 0 | Refills: 0 | Status: DISCONTINUED | OUTPATIENT
Start: 2018-04-10 | End: 2018-04-12

## 2018-04-10 RX ORDER — INSULIN LISPRO 100/ML
2 VIAL (ML) SUBCUTANEOUS
Qty: 0 | Refills: 0 | Status: DISCONTINUED | OUTPATIENT
Start: 2018-04-10 | End: 2018-04-12

## 2018-04-10 RX ADMIN — ATORVASTATIN CALCIUM 40 MILLIGRAM(S): 80 TABLET, FILM COATED ORAL at 21:04

## 2018-04-10 RX ADMIN — OXYCODONE HYDROCHLORIDE 10 MILLIGRAM(S): 5 TABLET ORAL at 03:33

## 2018-04-10 RX ADMIN — TAMSULOSIN HYDROCHLORIDE 0.4 MILLIGRAM(S): 0.4 CAPSULE ORAL at 21:04

## 2018-04-10 RX ADMIN — Medication 4 MILLIGRAM(S): at 17:39

## 2018-04-10 RX ADMIN — Medication 0.25 MILLIGRAM(S): at 21:03

## 2018-04-10 RX ADMIN — OXYCODONE HYDROCHLORIDE 10 MILLIGRAM(S): 5 TABLET ORAL at 09:43

## 2018-04-10 RX ADMIN — Medication 4 MILLIGRAM(S): at 06:03

## 2018-04-10 RX ADMIN — Medication 0.25 MILLIGRAM(S): at 13:48

## 2018-04-10 RX ADMIN — Medication 2: at 09:47

## 2018-04-10 RX ADMIN — GABAPENTIN 300 MILLIGRAM(S): 400 CAPSULE ORAL at 17:40

## 2018-04-10 RX ADMIN — OXYCODONE HYDROCHLORIDE 10 MILLIGRAM(S): 5 TABLET ORAL at 17:40

## 2018-04-10 RX ADMIN — INSULIN GLARGINE 30 UNIT(S): 100 INJECTION, SOLUTION SUBCUTANEOUS at 21:03

## 2018-04-10 RX ADMIN — OXYCODONE HYDROCHLORIDE 10 MILLIGRAM(S): 5 TABLET ORAL at 02:33

## 2018-04-10 RX ADMIN — GABAPENTIN 300 MILLIGRAM(S): 400 CAPSULE ORAL at 06:03

## 2018-04-10 RX ADMIN — OXYCODONE HYDROCHLORIDE 10 MILLIGRAM(S): 5 TABLET ORAL at 18:36

## 2018-04-10 RX ADMIN — PANTOPRAZOLE SODIUM 40 MILLIGRAM(S): 20 TABLET, DELAYED RELEASE ORAL at 06:03

## 2018-04-10 RX ADMIN — OXYCODONE HYDROCHLORIDE 10 MILLIGRAM(S): 5 TABLET ORAL at 10:35

## 2018-04-10 RX ADMIN — Medication 4 MILLIGRAM(S): at 13:46

## 2018-04-10 RX ADMIN — Medication 2 UNIT(S): at 21:07

## 2018-04-10 RX ADMIN — Medication 4 MILLIGRAM(S): at 23:56

## 2018-04-10 RX ADMIN — Medication 81 MILLIGRAM(S): at 13:48

## 2018-04-10 RX ADMIN — ENOXAPARIN SODIUM 40 MILLIGRAM(S): 100 INJECTION SUBCUTANEOUS at 13:54

## 2018-04-10 RX ADMIN — Medication 1 APPLICATION(S): at 06:04

## 2018-04-10 RX ADMIN — Medication 3: at 17:40

## 2018-04-10 RX ADMIN — Medication 1 PATCH: at 06:15

## 2018-04-10 RX ADMIN — Medication 1 APPLICATION(S): at 17:39

## 2018-04-10 RX ADMIN — Medication 2: at 13:48

## 2018-04-10 RX ADMIN — SENNA PLUS 2 TABLET(S): 8.6 TABLET ORAL at 21:04

## 2018-04-10 RX ADMIN — Medication 0.25 MILLIGRAM(S): at 06:03

## 2018-04-10 NOTE — OCCUPATIONAL THERAPY INITIAL EVALUATION ADULT - ADDITIONAL COMMENTS
Pt lives in private home with 4 SEAN and no steps inside; bedroom/bathroom on main level. Bathroom has tub with curtains. Pt owns RW, commode, shower chair. Pt is right handed. Pt does not drive. Pt has had a live-in personal aide 24 hours a day/7 days a week for the last 4 weeks. Aide states that he requires assistance out of bed and with all ADLs; aide states he guards patient while ambulating with RW. Aide drives pt to all appointments, completes all IADLs, etc.

## 2018-04-10 NOTE — OCCUPATIONAL THERAPY INITIAL EVALUATION ADULT - MANUAL MUSCLE TESTING RESULTS, REHAB EVAL
grossly assessed due to/bilateral shoulder flexion with AROM against gravity = 2/5; bilateral elbows 4/5; bilateral gross grasp = 4/5

## 2018-04-10 NOTE — OCCUPATIONAL THERAPY INITIAL EVALUATION ADULT - GENERAL OBSERVATIONS, REHAB EVAL
Received pt in semi-staley position in bed, +cardiac monitor/pulse ox, +IVs, +VCBs, +hemovac, +bedrails; pt agreeable to OT.

## 2018-04-10 NOTE — DISCHARGE NOTE ADULT - PLAN OF CARE
Improved function and pain control Leave occlusive dressing on wound for one week. You may then remove it and leave open to air. Protect while showering.  Leave steri strips intact, they will fall off on their own or be removed on first post op visit. Wear cervical collar when out of bed for comfort, especially when you are passenger in a car, may take off for meals & showering.  Physical therapy will be prescribed on second office visit.  For now, engage in light activity as tolerated, no lifting greater than 5 lb.  No driving while on pain meds and must wear cervical collar when in a vehicle for 28 days.  Use pain medications as prescribed. Leave occlusive dressing on wound for one week. You may then remove it and leave open to air. Protect while showering.  Leave steri strips intact, they will fall off on their own or be removed on first post op visit. Wear cervical collar when out of bed for comfort, especially when you are passenger in a car, may take off for meals & showering.  Physical therapy will be prescribed on second office visit.  For now, engage in light activity as tolerated, no lifting greater than 5 lb.  No driving while on pain meds and must wear cervical collar when in a vehicle for 28 days.  Use pain medications as prescribed.  LOVENOX for one week upon discharge. RESUME aspirin 81mg daily upon discharge.

## 2018-04-10 NOTE — CONSULT NOTE ADULT - SUBJECTIVE AND OBJECTIVE BOX
HPI:  73M PMHx spinal stenosis s/p cervical and lumbar laminectomy and fusions, CAD s/p CABG and stents, DM2 with peripheral neuropathy, bilateral carpal tunnel syndrome presented to [facility] 4/9 for elective spine surgery. Pt reports he continued to have pain in both legs and trouble with his balance and walking after both cervical and lumbar laminectomies. Outpatient work-up included a MRI of the cervical, thoracic and lumbar spine, which showed focal cord atrophy, deformity and chronic myelomalacia at C5-C6. He was then scheduled to undergo surgery with Dr. Fuller. He underwent an uncomplicated posterior cervical laminectomy from C5-C7 and fusion from C3-T1. Post-op he does not report any improvement in his pain at this time.     REVIEW OF SYSTEMS  Constitutional - No fever, No fatigue  HEENT - No visual disturbances,  +Neck pain localized to surgical site  Respiratory - No cough, No shortness of breath  Cardiovascular - No chest pain, No palpitations  Gastrointestinal - Chronic constipation, has been requesting prune juice with meals  Genitourinary - No dysuria, No frequency, No incontinence  Neurological - Bilateral LE weakness, intermittent non-painful spasms in bilateral LE, right > left, chronic numbness in bilateral feet at baseline, chronic numbness in hands at baseline, weak  due to carpal tunnel   Skin - No itching, No rashes  Musculoskeletal - Pain in bilateral LE, left groin pain from pulled muscle (started about 3-4wks prior to surgery)  Psychiatric - No depression, No anxiety  All other review of systems negative    PAST MEDICAL & SURGICAL HISTORY  Anemia  Sleep apnea  Tachycardia  Carpal tunnel syndrome, bilateral  DJD (degenerative joint disease)  Umbilical hernia  High cholesterol  CAD (coronary artery disease)  Diabetes  Hypertension  GERD  KEVYN  Cervical myelopathy with cervical radiculopathy  Spinal stenosis of cervical region  History of knee replacement, total, left  S/P CABG x 3  H/O umbilical hernia repair  S/P laminectomy  Status post arthroscopic knee surgery  History of appendectomy  History of tonsillectomy  Anterior cervical fusion    SOCIAL HISTORY  Smoking - Former smoker  EtOH - Denied   Drugs - Denied    FUNCTIONAL HISTORY  Right hand dominant  Lives alone in private house with 5 SEAN, 0 STI, Has 24/7 home health aide to assist in ADLs, transfers  Required assist for Ambulation, ADLs and transfers prior to hospitalization. Was ambulating primarily with RW at household distances,   Also has cane and walker  Retired , +Drives    CURRENT FUNCTIONAL STATUS  Bed mobility - mod assist  Transfers - min/mod assist with RW  Gait - Amb 15' x 2 RW min assist    FAMILY HISTORY   Family history of diabetes mellitus (Sibling)  Family history of prostate cancer  Family history of dementia  Family history of CHF (congestive heart failure)  Family history of early CAD      ALLERGIES  penicillin (Other)    VITALS  T(C): 36.4 (10 Apr 2018 08:33), Max: 37.9 (10 Apr 2018 04:39)  T(F): 97.6 (10 Apr 2018 08:33), Max: 100.2 (10 Apr 2018 04:39)  HR: 76 (10 Apr 2018 08:33) (67 - 78)  BP: 109/51 (10 Apr 2018 08:33) (100/44 - 138/51)  RR: 18 (10 Apr 2018 08:33) (9 - 20)  SpO2: 98% (10 Apr 2018 08:33) (96% - 100%)    PHYSICAL EXAM  Constitutional - NAD, Comfortable   HEENT - NCAT, EOMI  Neck - Posterior neck dressing c/d/i. Drain in place with bloody drainage  Chest - CTA bilaterally  Cardiovascular - RRR, S1S2  Abdomen - BS+, Soft, NTND  Extremities - No C/C/E, No calf tenderness, atrophy at 1st dorsal interosseous muscle bilateral hands, left thigh and groin pain with ROM   Neurologic Exam -                    Cognitive - Awake, Alert, AAO to self, place, date, year, situation     Communication - Fluent     Cranial Nerves - CN 2-12 grossly intact     Motor -                     LEFT    UE - ShAB 5/5, EF 5/5, EE 5/5, WE 5/5,  5/5                    RIGHT UE - ShAB 5/5, EF 5/5, EE 5/5, WE 5/5,  5/5                    LEFT    LE - HF 3/5, KE 4/5, DF 5/5, PF 5/5, EHL 5/5 (Hip flexion/knee extension limited secondary to pain)                    RIGHT LE - HF 4/5, KE 5/5, DF 4/5, PF 5/5, EHL 4/5        Sensory - Decreased to light touch in bilateral hands and feet     Reflexes - DTR intact and symmetrical, Negative Preciado's bilaterally, Negative Babinski's bilaterally. No clonus. Normal tone.      Coordination - Finger-to-nose intact bilaterally   Psychiatric - Affect WNL    RECENT LABS/IMAGING                        8.1    12.5  )-----------( 175      ( 10 Apr 2018 07:25 )             24.9     04-10    138  |  100  |  37.0<H>  ----------------------------<  190<H>  4.7   |  26.0  |  1.09    Ca    8.5<L>      10 Apr 2018 07:25      EXAM: MR SPINE CERVICAL     PROCEDURE DATE: 01/18/2018     INTERPRETATION:   CLINICAL INFORMATION: History of cervical stenosis with myelopathy status   post fusion bilateral extremity weakness follow-up.     TECHNIQUE: Multiplanar sequence MRI of cervical spine without administration   of contrast material.     COMPARISON: Prior study of 2/20/2016.     FINDINGS:     There is normal craniovertebral junction. There are degenerative changes of   the atlantoaxial articulation and at the tip of the odontoid which appears   to be somewhat eroded. The ventral and dorsal subarachnoid space are intact.     There is straightening of the normal cervical lordosis. There is   developmentally small canal related to short pedicles.     C2-3: There is disc desiccation and decrease in height. There is mild bulge   and endplate spur. There is no focal herniation. There are uncovertebral   arthrosis. There is normal central canal and neural foramina.     C3-4: There is disc desiccation and decrease in height. There is   broad-based disc osteophyte and endplate spur. There is no focal herniation.   There are uncovertebral arthrosis. There is mild canal and foraminal   narrowing.     C4-5: There is disc desiccation and decrease in height. There is broad-based   disc osteophyte complex and endplate hypertrophy. There is degenerative   uncovertebral arthrosis. There is mild canal and moderate right greater than   left foraminal stenosis.     C5-6: There are postoperative changes related to anterior cervical fusion   instrumentation. There is no pseudoarthrosis. There is disc osteophyte   complex that deforms ventral thecal sac with persistent stenosis slightly   improved. There is persistent cord deformity, atrophy and myelomalacia.     C6-7: There are postoperative changes related to anterior cervical fusion   instrumentation. There is no pseudoarthrosis. There is disc osteophyte   complex and endplate spur deforming ventral thecal sac and uncovertebral   arthrosis without gross pseudoarthrosis. There is mild to moderate canal and   foraminal narrowing.     C7-T1: There is disc desiccation and decrease in height. There is disc   osteophyte complex and endplate spur. There is mild uncovertebral joint   arthrosis. Normal central canal and intervertebral neuroforamina.     There is chronic myelomalacia changes in the cervical cord with chronic   atrophy and deformity C5-6.     Evaluation of paraspinal soft tissues demonstrates mild nonspecific   prominence of nasopharyngeal soft tissues.     IMPRESSION:     Postoperative change related to anterior cervical discectomy fusion   instrumentation C5-6 and C6-7. No pseudoarthrosis.     Focal cord atrophy, deformity and chronic myelomalacia C5-6.     Multilevel degenerative spondylosis overall stable as compared to previous   exam.       MEDICATIONS   MEDICATIONS  (STANDING):  ALPRAZolam 0.25 milliGRAM(s) Oral three times a day  aspirin  chewable 81 milliGRAM(s) Oral daily  atorvastatin 40 milliGRAM(s) Oral at bedtime  BACItracin   Ointment 1 Application(s) Topical two times a day  dexamethasone     Tablet 4 milliGRAM(s) Oral every 6 hours  enoxaparin Injectable 40 milliGRAM(s) SubCutaneous every 24 hours  gabapentin 300 milliGRAM(s) Oral two times a day  insulin lispro (HumaLOG) corrective regimen sliding scale   SubCutaneous three times a day before meals  mometasone 220 MICROgram(s) Inhaler 2 Puff(s) Inhalation two times a day  nitroglycerin    Patch 0.3 mG/Hr(s) 1 patch Transdermal daily  pantoprazole    Tablet 40 milliGRAM(s) Oral before breakfast  senna 2 Tablet(s) Oral at bedtime  sodium chloride 0.45%. 1000 milliLiter(s) (80 mL/Hr) IV Continuous <Continuous>  tamsulosin 0.4 milliGRAM(s) Oral at bedtime    MEDICATIONS  (PRN):  acetaminophen   Tablet. 975 milliGRAM(s) Oral every 6 hours PRN Mild Pain (1 - 3) or temp 100.3f or above  ALBUTerol    0.083% 2.5 milliGRAM(s) Nebulizer every 6 hours PRN Bronchospasm  ALBUTerol   0.5% 2.5 milliGRAM(s) Nebulizer every 6 hours PRN Shortness of Breath and/or Wheezing  aluminum hydroxide/magnesium hydroxide/simethicone Suspension 30 milliLiter(s) Oral every 12 hours PRN Indigestion  magnesium hydroxide Suspension 30 milliLiter(s) Oral every 12 hours PRN Constipation  methocarbamol 500 milliGRAM(s) Oral every 6 hours PRN Back Spasms  ondansetron Injectable 4 milliGRAM(s) IV Push every 6 hours PRN Nausea  oxyCODONE    IR 5 milliGRAM(s) Oral every 4 hours PRN Moderate Pain (4 - 6)  oxyCODONE    IR 10 milliGRAM(s) Oral every 4 hours PRN Severe Pain (7 - 10)      ASSESSMENT/PLAN  73M with cervical myelopathy s/p posterior C4-C6 laminectomy and C3-T1 fusion with functional, gait, ADL impairments.    Disposition - Continue bedside therapy while inpatient to maximize function with goal of home with home therapy. If discharge home is not an option may consider a short course of CARMELA.  PT - ROM, Bed mobility, Transfers, Ambulation with assistive device  OT - ADLs, ROM  Precautions - Falls  DVT Prophylaxis - Lovenox  Weight bearing status - WBAT bilateral LE  Skin - Turn Q2hrs  Diet - Consistent carb

## 2018-04-10 NOTE — PHYSICAL THERAPY INITIAL EVALUATION ADULT - RANGE OF MOTION EXAMINATION, REHAB EVAL
UEs assessed to below shoulder height/bilateral lower extremity ROM was WFL (within functional limits)/bilateral upper extremity ROM was WNL (within normal limits)

## 2018-04-10 NOTE — DISCHARGE NOTE ADULT - CARE PLAN
Principal Discharge DX:	Spinal stenosis of cervical region  Goal:	Improved function and pain control  Assessment and plan of treatment:	Leave occlusive dressing on wound for one week. You may then remove it and leave open to air. Protect while showering.  Leave steri strips intact, they will fall off on their own or be removed on first post op visit. Wear cervical collar when out of bed for comfort, especially when you are passenger in a car, may take off for meals & showering.  Physical therapy will be prescribed on second office visit.  For now, engage in light activity as tolerated, no lifting greater than 5 lb.  No driving while on pain meds and must wear cervical collar when in a vehicle for 28 days.  Use pain medications as prescribed. Principal Discharge DX:	Spinal stenosis of cervical region  Goal:	Improved function and pain control  Assessment and plan of treatment:	Leave occlusive dressing on wound for one week. You may then remove it and leave open to air. Protect while showering.  Leave steri strips intact, they will fall off on their own or be removed on first post op visit. Wear cervical collar when out of bed for comfort, especially when you are passenger in a car, may take off for meals & showering.  Physical therapy will be prescribed on second office visit.  For now, engage in light activity as tolerated, no lifting greater than 5 lb.  No driving while on pain meds and must wear cervical collar when in a vehicle for 28 days.  Use pain medications as prescribed.  LOVENOX for one week upon discharge. RESUME aspirin 81mg daily upon discharge.

## 2018-04-10 NOTE — CONSULT NOTE ADULT - ATTENDING COMMENTS
Chart reviewed. Patient's HHA at bedside. 73 year old male with history as listed above, admitted for surgery for cervical myelopathy. He underwent C4-C6 lami and C3-T1 fusion.   Continue daily therapy PT while in hospital. If unable to return home, discharge to [facility] may be considered.   Thank you.

## 2018-04-10 NOTE — DISCHARGE NOTE ADULT - HOSPITAL COURSE
72 year old male with a history of Diabetes, CAD s/p CABG and multiple stent placements, dyslipidemia, KEVYN, and GERD presents with a cervical myelopathy and past lumbar laminectomy with chronic lower extremity motor and sensory weaknesses was scheduled for a cervical laminectomy and posterior cervical fusion on 4/9/2018. He underwent a multi-level cervical laminectomy with multi-level posterior cervical and thoracic fusion. The pain was controlled. The patient was seen by PT and had an acute rehab evaluation. Drain out-put was monitored and removed when volume decreased. Patient was discharged in stable condition.

## 2018-04-10 NOTE — PROGRESS NOTE ADULT - SUBJECTIVE AND OBJECTIVE BOX
Anesthesia Postop NOte  73y Male po day 1    T(C): 36.4 (04-10-18 @ 08:33), Max: 37.9 (04-10-18 @ 04:39)  HR: 76 (04-10-18 @ 08:33) (67 - 78)  BP: 109/51 (04-10-18 @ 08:33) (100/44 - 138/51)  RR: 18 (04-10-18 @ 08:33) (9 - 20)  SpO2: 98% (04-10-18 @ 08:33) (96% - 100%)    Pt seen, VSS but still c/o lot of pain  9/10, denied nausea, no anesthesia complications or complaints noted or reported.

## 2018-04-10 NOTE — DISCHARGE NOTE ADULT - CARE PROVIDER_API CALL
Jacky Fuller (DO), Orthopaedic Surgery  [address] [place] [address]  Phone: [phone]  Fax: [phone]

## 2018-04-10 NOTE — PHYSICAL THERAPY INITIAL EVALUATION ADULT - ADDITIONAL COMMENTS
Patient has live in aide, assists with all ADLs and mobility.  Pt lives in private home with 4 steps to enter with left rail.  Bedroom and bathroom on main level.

## 2018-04-10 NOTE — DISCHARGE NOTE ADULT - MEDICATION SUMMARY - MEDICATIONS TO STOP TAKING
I will STOP taking the medications listed below when I get home from the hospital:    oxyCODONE-acetaminophen 5 mg-325 mg oral tablet  -- 1 tab(s) by mouth every 6 hours, As needed, Severe Pain (7 - 10)

## 2018-04-10 NOTE — PROGRESS NOTE ADULT - SUBJECTIVE AND OBJECTIVE BOX
HPI:  72 year old male with a history of Diabetes, CAD s/p CABG and multiple stent placements, dyslipidemia, KEVYN, and GERD presents with a cervical spine disorder scheduled for a cervical laminectomy and posterior cervical fusion on 4/9/18. (26 Mar 2018 15:27)     Allergies    penicillin (Other)    Intolerances      Anemia  Ventricular tachycardia  Reactive airway disease  Back pain  Sleep apnea  Tachycardia  Carpal tunnel syndrome, bilateral  DJD (degenerative joint disease)  Umbilical hernia  High cholesterol  CAD (coronary artery disease)  Diabetes  Hypertension    MEDICATIONS  (STANDING):  ALPRAZolam 0.25 milliGRAM(s) Oral three times a day  aspirin  chewable 81 milliGRAM(s) Oral daily  atorvastatin 40 milliGRAM(s) Oral at bedtime  BACItracin   Ointment 1 Application(s) Topical two times a day  dexamethasone     Tablet 4 milliGRAM(s) Oral every 6 hours  dextrose 5%. 1000 milliLiter(s) (50 mL/Hr) IV Continuous <Continuous>  dextrose 50% Injectable 12.5 Gram(s) IV Push once  dextrose 50% Injectable 25 Gram(s) IV Push once  dextrose 50% Injectable 25 Gram(s) IV Push once  enoxaparin Injectable 40 milliGRAM(s) SubCutaneous every 24 hours  gabapentin 300 milliGRAM(s) Oral two times a day  insulin lispro (HumaLOG) corrective regimen sliding scale   SubCutaneous three times a day before meals  mometasone 220 MICROgram(s) Inhaler 2 Puff(s) Inhalation two times a day  nitroglycerin    Patch 0.3 mG/Hr(s) 1 patch Transdermal daily  pantoprazole    Tablet 40 milliGRAM(s) Oral before breakfast  senna 2 Tablet(s) Oral at bedtime  sodium chloride 0.45%. 1000 milliLiter(s) (80 mL/Hr) IV Continuous <Continuous>  tamsulosin 0.4 milliGRAM(s) Oral at bedtime    MEDICATIONS  (PRN):  acetaminophen   Tablet. 975 milliGRAM(s) Oral every 6 hours PRN Mild Pain (1 - 3) or temp 100.3f or above  ALBUTerol    0.083% 2.5 milliGRAM(s) Nebulizer every 6 hours PRN Bronchospasm  ALBUTerol   0.5% 2.5 milliGRAM(s) Nebulizer every 6 hours PRN Shortness of Breath and/or Wheezing  aluminum hydroxide/magnesium hydroxide/simethicone Suspension 30 milliLiter(s) Oral every 12 hours PRN Indigestion  dextrose Gel 1 Dose(s) Oral once PRN Blood Glucose LESS THAN 70 milliGRAM(s)/deciliter  glucagon  Injectable 1 milliGRAM(s) IntraMuscular once PRN Glucose LESS THAN 70 milligrams/deciliter  magnesium hydroxide Suspension 30 milliLiter(s) Oral every 12 hours PRN Constipation  methocarbamol 500 milliGRAM(s) Oral every 6 hours PRN Back Spasms  ondansetron Injectable 4 milliGRAM(s) IV Push every 6 hours PRN Nausea  oxyCODONE    IR 5 milliGRAM(s) Oral every 4 hours PRN Moderate Pain (4 - 6)  oxyCODONE    IR 10 milliGRAM(s) Oral every 4 hours PRN Severe Pain (7 - 10)                           8.1    12.5  )-----------( 175      ( 10 Apr 2018 07:25 )             24.9     04-10    138  |  100  |  37.0<H>  ----------------------------<  190<H>  4.7   |  26.0  |  1.09    Ca    8.5<L>      10 Apr 2018 07:25        ;  Vital Signs Last 24 Hrs  T(C): 37.2 (10 Apr 2018 16:47), Max: 37.9 (10 Apr 2018 04:39)  T(F): 98.9 (10 Apr 2018 16:47), Max: 100.2 (10 Apr 2018 04:39)  HR: 74 (10 Apr 2018 16:47) (74 - 78)  BP: 110/57 (10 Apr 2018 16:47) (106/44 - 123/63)  BP(mean): --  RR: 18 (10 Apr 2018 16:47) (17 - 18)  SpO2: 96% (10 Apr 2018 16:47) (96% - 98%)  CAPILLARY BLOOD GLUCOSE      04-09 @ 07:01  -  04-10 @ 07:00  --------------------------------------------------------  IN: 1670 mL / OUT: 1115 mL / NET: 555 mL    04-10 @ 07:01  -  04-10 @ 19:47  --------------------------------------------------------  IN: 0 mL / OUT: 5 mL / NET: -5 mL      Patient feeling better mild neck pain No CP, No SOB, No N/V    HEENT: PEARLA  Neck: Supple Incision clean  Cardio: S1 S2 SE Murmur  Pulm: Good Air entry No Rales Occ Ronchi  Abd: Soft NT ND BS+  Rectal - refused  Ext: No DCT  Skin: No Rash  Neuro: Awake Pleasant      Cervical Surgery - post op pain control  Anemia - follow up Hb  Ventricular tachycardia - -atenolol  Asthma -- cont meds add prn nebs  Sleep apnea: Intolerant of CPAP  CAD - restart ASA once surgically acceptable  Diabetes - SS and Lantus

## 2018-04-10 NOTE — PROGRESS NOTE ADULT - SUBJECTIVE AND OBJECTIVE BOX
ORTHO-SPINE POST-OP PROGRESS NOTE:      [id number]    ALESSIA IBARRA      PROCEDURE: multi-level cervical laminectomy with multi-level posterior cervical and thoracic fusion    DOS: 4/9/2018      SUBJECTIVE: 73y Patient seen and examined. Patient reports of moderate discomfort that is controlled by pain medications. Patient has chronic distal UE sensory changes due to carpal tunnel and chronic LE dysesthesia and motor weaknesses. No acute post-operative changes reported.           I&O's Detail    09 Apr 2018 07:01  -  10 Apr 2018 07:00  --------------------------------------------------------  IN:    lactated ringers.: 200 mL    Oral Fluid: 350 mL    sodium chloride 0.45%.: 1120 mL  Total IN: 1670 mL    OUT:    Accordian: 105 mL    Indwelling Catheter - Urethral: 1010 mL  Total OUT: 1115 mL    Total NET: 555 mL            acetaminophen   Tablet. 975 milliGRAM(s) Oral every 6 hours PRN  ALBUTerol    0.083% 2.5 milliGRAM(s) Nebulizer every 6 hours PRN  ALBUTerol   0.5% 2.5 milliGRAM(s) Nebulizer every 6 hours PRN  ALPRAZolam 0.25 milliGRAM(s) Oral three times a day  aluminum hydroxide/magnesium hydroxide/simethicone Suspension 30 milliLiter(s) Oral every 12 hours PRN  aspirin  chewable 81 milliGRAM(s) Oral daily  atorvastatin 40 milliGRAM(s) Oral at bedtime  BACItracin   Ointment 1 Application(s) Topical two times a day  dexamethasone     Tablet 4 milliGRAM(s) Oral every 6 hours  dextrose 5%. 1000 milliLiter(s) IV Continuous <Continuous>  dextrose 50% Injectable 12.5 Gram(s) IV Push once  dextrose 50% Injectable 25 Gram(s) IV Push once  dextrose 50% Injectable 25 Gram(s) IV Push once  dextrose Gel 1 Dose(s) Oral once PRN  enoxaparin Injectable 40 milliGRAM(s) SubCutaneous every 24 hours  gabapentin 300 milliGRAM(s) Oral two times a day  glucagon  Injectable 1 milliGRAM(s) IntraMuscular once PRN  insulin lispro (HumaLOG) corrective regimen sliding scale   SubCutaneous three times a day before meals  magnesium hydroxide Suspension 30 milliLiter(s) Oral every 12 hours PRN  methocarbamol 500 milliGRAM(s) Oral every 6 hours PRN  mometasone 220 MICROgram(s) Inhaler 2 Puff(s) Inhalation two times a day  nitroglycerin    Patch 0.3 mG/Hr(s) 1 patch Transdermal daily  ondansetron Injectable 4 milliGRAM(s) IV Push every 6 hours PRN  oxyCODONE    IR 5 milliGRAM(s) Oral every 4 hours PRN  oxyCODONE    IR 10 milliGRAM(s) Oral every 4 hours PRN  pantoprazole    Tablet 40 milliGRAM(s) Oral before breakfast  senna 2 Tablet(s) Oral at bedtime  sodium chloride 0.45%. 1000 milliLiter(s) IV Continuous <Continuous>  tamsulosin 0.4 milliGRAM(s) Oral at bedtime        T(C): 37.9 (04-10-18 @ 04:39), Max: 37.9 (04-10-18 @ 04:39)  HR: 77 (04-10-18 @ 04:39) (67 - 78)  BP: 106/44 (04-10-18 @ 04:39) (100/44 - 138/51)  RR: 17 (04-10-18 @ 04:39) (9 - 20)  SpO2: 96% (04-10-18 @ 04:39) (96% - 100%)  Wt(kg): --      PHYSICAL EXAM:     Constitutional: Alert, responsive, in no acute distress.     Spine:          Dressing: incision site Clean/dry/intact WITH DRAIN NOTED - dressing around the drain is saturated with bloody discharge. No active bleeding or discharge noted.            Drains:  present with OUTPUT of 105 ml           Sensation: normal to light touch of the UE's. decreased sensation of both LE's distal to the knees.            Pathologic reflexes: No Clonus                  Motor exam: 3/5 motor noted of the lower extremity hip flexors. 5/5 motor of the ankles with DF and PF. 5/5 distal UE motor function of the wrists and fingers.   No calf tenderness.                                        Vascular:  + warm well perfused; capillary refill <3 seconds                                                       A/P :  72y Male S/P multi-level cervical laminectomy with multi-level posterior cervical and thoracic fusion with history of chronic myelopathy POD# 1    -  Pain control  -  DVT ppx: [x]SCDs   [x] Pharmacolgic    -  PT and out of bed today  -  Weight bearing status: WBAT [X]        PWB    [ ]     TTWB  [ ]      NWB  [ ]  -  Dispo: pending AR evaluation  - dressing changed  - drain - will continue to follow output ORTHO-SPINE POST-OP PROGRESS NOTE:      [id number]    ALESSIA IBARRA      PROCEDURE: multi-level cervical laminectomy with multi-level posterior cervical and thoracic fusion    DOS: 4/9/2018      SUBJECTIVE: 73y Patient seen and examined. Patient reports of moderate discomfort that is controlled by pain medications. Patient has chronic distal UE sensory changes due to carpal tunnel and chronic LE dysesthesia and motor weaknesses. No acute post-operative changes reported.           I&O's Detail    09 Apr 2018 07:01  -  10 Apr 2018 07:00  --------------------------------------------------------  IN:    lactated ringers.: 200 mL    Oral Fluid: 350 mL    sodium chloride 0.45%.: 1120 mL  Total IN: 1670 mL    OUT:    Accordian: 105 mL    Indwelling Catheter - Urethral: 1010 mL  Total OUT: 1115 mL    Total NET: 555 mL            acetaminophen   Tablet. 975 milliGRAM(s) Oral every 6 hours PRN  ALBUTerol    0.083% 2.5 milliGRAM(s) Nebulizer every 6 hours PRN  ALBUTerol   0.5% 2.5 milliGRAM(s) Nebulizer every 6 hours PRN  ALPRAZolam 0.25 milliGRAM(s) Oral three times a day  aluminum hydroxide/magnesium hydroxide/simethicone Suspension 30 milliLiter(s) Oral every 12 hours PRN  aspirin  chewable 81 milliGRAM(s) Oral daily  atorvastatin 40 milliGRAM(s) Oral at bedtime  BACItracin   Ointment 1 Application(s) Topical two times a day  dexamethasone     Tablet 4 milliGRAM(s) Oral every 6 hours  dextrose 5%. 1000 milliLiter(s) IV Continuous <Continuous>  dextrose 50% Injectable 12.5 Gram(s) IV Push once  dextrose 50% Injectable 25 Gram(s) IV Push once  dextrose 50% Injectable 25 Gram(s) IV Push once  dextrose Gel 1 Dose(s) Oral once PRN  enoxaparin Injectable 40 milliGRAM(s) SubCutaneous every 24 hours  gabapentin 300 milliGRAM(s) Oral two times a day  glucagon  Injectable 1 milliGRAM(s) IntraMuscular once PRN  insulin lispro (HumaLOG) corrective regimen sliding scale   SubCutaneous three times a day before meals  magnesium hydroxide Suspension 30 milliLiter(s) Oral every 12 hours PRN  methocarbamol 500 milliGRAM(s) Oral every 6 hours PRN  mometasone 220 MICROgram(s) Inhaler 2 Puff(s) Inhalation two times a day  nitroglycerin    Patch 0.3 mG/Hr(s) 1 patch Transdermal daily  ondansetron Injectable 4 milliGRAM(s) IV Push every 6 hours PRN  oxyCODONE    IR 5 milliGRAM(s) Oral every 4 hours PRN  oxyCODONE    IR 10 milliGRAM(s) Oral every 4 hours PRN  pantoprazole    Tablet 40 milliGRAM(s) Oral before breakfast  senna 2 Tablet(s) Oral at bedtime  sodium chloride 0.45%. 1000 milliLiter(s) IV Continuous <Continuous>  tamsulosin 0.4 milliGRAM(s) Oral at bedtime        T(C): 37.9 (04-10-18 @ 04:39), Max: 37.9 (04-10-18 @ 04:39)  HR: 77 (04-10-18 @ 04:39) (67 - 78)  BP: 106/44 (04-10-18 @ 04:39) (100/44 - 138/51)  RR: 17 (04-10-18 @ 04:39) (9 - 20)  SpO2: 96% (04-10-18 @ 04:39) (96% - 100%)  Wt(kg): --      PHYSICAL EXAM:     Constitutional: Alert, responsive, in no acute distress.     Spine:          Dressing: incision site Clean/dry/intact WITH DRAIN NOTED - dressing around the drain is saturated with bloody discharge. No active bleeding or discharge noted.            Drains:  present with OUTPUT of 105 ml           Sensation: normal to light touch of the UE's. decreased sensation of both LE's distal to the knees.            Pathologic reflexes: No Clonus                  Motor exam: 3/5 motor noted of the lower extremity hip flexors. 5/5 motor of the ankles with DF and PF. 5/5 distal UE motor function of the wrists and fingers.   No calf tenderness.                                        Vascular:  + warm well perfused; capillary refill <3 seconds                                                       A/P :  72y Male S/P multi-level cervical laminectomy with multi-level posterior cervical and thoracic fusion with history of chronic myelopathy POD# 1    -  Pain control  -  DVT ppx: [x]SCDs   [x] Pharmacolgic    -  PT and out of bed today  -  Weight bearing status: WBAT [X]        PWB    [ ]     TTWB  [ ]      NWB  [ ]  -  Dispo: pending AR evaluation  - dressing changed  - drain - will continue to follow output  had a pleasant post op convo with Mr Ibarra discussed intraop and post op course. acutely doing well with  no acute compromise

## 2018-04-10 NOTE — DISCHARGE NOTE ADULT - PATIENT PORTAL LINK FT
You can access the [email or web address] Portal, offered by [facility], by registering with the following website: [email or web address]

## 2018-04-10 NOTE — DISCHARGE NOTE ADULT - MEDICATION SUMMARY - MEDICATIONS TO TAKE
I will START or STAY ON the medications listed below when I get home from the hospital:    oxyCODONE 10 mg oral tablet  -- 1 tab(s) by mouth every 4 hours, As needed, Severe Pain (7 - 10)  -- Indication: For pain    oxyCODONE 5 mg oral tablet  -- 1 tab(s) by mouth every 4 hours, As needed, Moderate Pain (4 - 6)  -- Indication: For pain    acetaminophen 325 mg oral tablet  -- 3 tab(s) by mouth every 6 hours, As needed, Mild Pain (1 - 3) or temp 100.3f or above  -- Indication: For pain    aspirin 81 mg oral tablet, chewable  -- 1 tab(s) by mouth once a day START upon discharge  -- Indication: For clot prevention    nitroglycerin 0.3 mg/hr transdermal film, extended release  -- 1 patch by transdermal patch once a day  -- Indication: For cardiac    enoxaparin  -- 40 milligram(s) subcutaneous once a day for ONE wek from discharge  -- Indication: For clot prevention    gabapentin 100 mg oral tablet  -- 3 tab(s) by mouth once a day (at bedtime)  -- Indication: For home med    glipiZIDE 5 mg oral tablet, extended release  -- 1 tab(s) by mouth once a day  -- Indication: For home med    metFORMIN 500 mg oral tablet, extended release  -- 1 tab(s) by mouth once a day  -- hold for 3 days please  -- Indication: For home med    simvastatin 80 mg oral tablet  -- 1 tab(s) by mouth once a day (at bedtime)  -- Indication: For home med    ALPRAZolam 0.25 mg oral tablet  -- 1 tab(s) by mouth 2 times a day, As Needed  -- Indication: For home med    atenolol 50 mg oral tablet  -- 1 tab(s) by mouth once a day  -- Indication: For home med    bumetanide 1 mg oral tablet  -- 1 tab(s) by mouth once a day  -- Indication: For home med    ferrous sulfate  -- 65 milligram(s) by mouth once a day  -- Indication: For home med    magnesium oxide 400 mg (241.3 mg elemental magnesium) oral tablet  -- 1 tab(s) by mouth once a day  -- Indication: For home med    potassium chloride 10 mEq oral tablet, extended release  -- 1 tab(s) by mouth once a day  -- Indication: For home med    omeprazole 40 mg oral delayed release capsule  -- 1 cap(s) by mouth once a day  -- Indication: For home med    Asmanex HFA  -- 220 microgram(s) inhaled 2 times a day  -- Indication: For home med

## 2018-04-10 NOTE — OCCUPATIONAL THERAPY INITIAL EVALUATION ADULT - RANGE OF MOTION EXAMINATION
Bilateral shoulder flexion AROM tested to 90 degrees due to cervical spine precautions; bilateral elbows WFL, bilateral wrists WFL and biateral gross grasp WFL

## 2018-04-11 LAB
ANION GAP SERPL CALC-SCNC: 13 MMOL/L — SIGNIFICANT CHANGE UP (ref 5–17)
BUN SERPL-MCNC: 44 MG/DL — HIGH (ref 8–20)
CALCIUM SERPL-MCNC: 8.6 MG/DL — SIGNIFICANT CHANGE UP (ref 8.6–10.2)
CHLORIDE SERPL-SCNC: 98 MMOL/L — SIGNIFICANT CHANGE UP (ref 98–107)
CO2 SERPL-SCNC: 25 MMOL/L — SIGNIFICANT CHANGE UP (ref 22–29)
CREAT SERPL-MCNC: 0.93 MG/DL — SIGNIFICANT CHANGE UP (ref 0.5–1.3)
GLUCOSE BLDC GLUCOMTR-MCNC: 171 MG/DL — HIGH (ref 70–99)
GLUCOSE BLDC GLUCOMTR-MCNC: 252 MG/DL — HIGH (ref 70–99)
GLUCOSE BLDC GLUCOMTR-MCNC: 280 MG/DL — HIGH (ref 70–99)
GLUCOSE BLDC GLUCOMTR-MCNC: 338 MG/DL — HIGH (ref 70–99)
GLUCOSE SERPL-MCNC: 227 MG/DL — HIGH (ref 70–115)
HCT VFR BLD CALC: 24.6 % — LOW (ref 42–52)
HGB BLD-MCNC: 8.2 G/DL — LOW (ref 14–18)
MCHC RBC-ENTMCNC: 30.6 PG — SIGNIFICANT CHANGE UP (ref 27–31)
MCHC RBC-ENTMCNC: 33.3 G/DL — SIGNIFICANT CHANGE UP (ref 32–36)
MCV RBC AUTO: 91.8 FL — SIGNIFICANT CHANGE UP (ref 80–94)
PLATELET # BLD AUTO: 185 K/UL — SIGNIFICANT CHANGE UP (ref 150–400)
POTASSIUM SERPL-MCNC: 4.4 MMOL/L — SIGNIFICANT CHANGE UP (ref 3.5–5.3)
POTASSIUM SERPL-SCNC: 4.4 MMOL/L — SIGNIFICANT CHANGE UP (ref 3.5–5.3)
RBC # BLD: 2.68 M/UL — LOW (ref 4.6–6.2)
RBC # FLD: 12.9 % — SIGNIFICANT CHANGE UP (ref 11–15.6)
SODIUM SERPL-SCNC: 136 MMOL/L — SIGNIFICANT CHANGE UP (ref 135–145)
WBC # BLD: 12.5 K/UL — HIGH (ref 4.8–10.8)
WBC # FLD AUTO: 12.5 K/UL — HIGH (ref 4.8–10.8)

## 2018-04-11 RX ADMIN — Medication 1: at 17:43

## 2018-04-11 RX ADMIN — Medication 4 MILLIGRAM(S): at 12:55

## 2018-04-11 RX ADMIN — Medication 4 MILLIGRAM(S): at 05:29

## 2018-04-11 RX ADMIN — TAMSULOSIN HYDROCHLORIDE 0.4 MILLIGRAM(S): 0.4 CAPSULE ORAL at 21:54

## 2018-04-11 RX ADMIN — OXYCODONE HYDROCHLORIDE 10 MILLIGRAM(S): 5 TABLET ORAL at 16:19

## 2018-04-11 RX ADMIN — Medication 0.25 MILLIGRAM(S): at 12:56

## 2018-04-11 RX ADMIN — ATENOLOL 50 MILLIGRAM(S): 25 TABLET ORAL at 05:29

## 2018-04-11 RX ADMIN — INSULIN GLARGINE 30 UNIT(S): 100 INJECTION, SOLUTION SUBCUTANEOUS at 21:55

## 2018-04-11 RX ADMIN — Medication 81 MILLIGRAM(S): at 12:55

## 2018-04-11 RX ADMIN — Medication 0.25 MILLIGRAM(S): at 05:29

## 2018-04-11 RX ADMIN — OXYCODONE HYDROCHLORIDE 10 MILLIGRAM(S): 5 TABLET ORAL at 16:45

## 2018-04-11 RX ADMIN — Medication 0.25 MILLIGRAM(S): at 21:54

## 2018-04-11 RX ADMIN — MOMETASONE FUROATE 2 PUFF(S): 220 INHALANT RESPIRATORY (INHALATION) at 21:54

## 2018-04-11 RX ADMIN — Medication 3: at 08:42

## 2018-04-11 RX ADMIN — GABAPENTIN 300 MILLIGRAM(S): 400 CAPSULE ORAL at 05:29

## 2018-04-11 RX ADMIN — Medication 1 APPLICATION(S): at 17:41

## 2018-04-11 RX ADMIN — Medication 2 UNIT(S): at 17:43

## 2018-04-11 RX ADMIN — ENOXAPARIN SODIUM 40 MILLIGRAM(S): 100 INJECTION SUBCUTANEOUS at 12:56

## 2018-04-11 RX ADMIN — Medication 3: at 12:57

## 2018-04-11 RX ADMIN — Medication 2 UNIT(S): at 08:42

## 2018-04-11 RX ADMIN — Medication 2 UNIT(S): at 12:57

## 2018-04-11 RX ADMIN — Medication 1 PATCH: at 17:42

## 2018-04-11 RX ADMIN — PANTOPRAZOLE SODIUM 40 MILLIGRAM(S): 20 TABLET, DELAYED RELEASE ORAL at 05:29

## 2018-04-11 RX ADMIN — MOMETASONE FUROATE 2 PUFF(S): 220 INHALANT RESPIRATORY (INHALATION) at 12:56

## 2018-04-11 RX ADMIN — ATORVASTATIN CALCIUM 40 MILLIGRAM(S): 80 TABLET, FILM COATED ORAL at 21:54

## 2018-04-11 RX ADMIN — GABAPENTIN 300 MILLIGRAM(S): 400 CAPSULE ORAL at 17:41

## 2018-04-11 RX ADMIN — Medication 1 APPLICATION(S): at 05:30

## 2018-04-11 RX ADMIN — SENNA PLUS 2 TABLET(S): 8.6 TABLET ORAL at 21:54

## 2018-04-11 NOTE — PROGRESS NOTE ADULT - SUBJECTIVE AND OBJECTIVE BOX
H&H reviewed.                          8.2    12.5  )-----------( 185      ( 11 Apr 2018 08:32 )             24.6       Dressing applied this morning is dry.    PLAN:  * Will continue current dressing  * Will follow-up occult stool

## 2018-04-11 NOTE — PROGRESS NOTE ADULT - SUBJECTIVE AND OBJECTIVE BOX
ORTHO-SPINE POST-OP PROGRESS NOTE:      [id number]    ALESSIA IBARRA      PROCEDURE: multi-level cervical laminectomy with multi-level posterior cervical and thoracic fusion      DOS: 4/9/2018      SUBJECTIVE: 73y Patient seen and examined. Patient reports of minimal discomfort that is controlled by pain medications. Patient has chronic distal UE sensory changes due to carpal tunnel and chronic LE dysesthesia and motor weaknesses. No acute post-operative changes reported.                             8.1    12.5  )-----------( 175      ( 10 Apr 2018 07:25 )             24.9         04-11    136  |  98  |  44.0<H>  ----------------------------<  227<H>  4.4   |  25.0  |  0.93    Ca    8.6      11 Apr 2018 06:40      I&O's Detail    10 Apr 2018 07:01  -  11 Apr 2018 07:00  --------------------------------------------------------  IN:  Total IN: 0 mL    OUT:    Accordian: 5 mL    Voided: 700 mL  Total OUT: 705 mL    Total NET: -705 mL          MEDICATIONS  (STANDING):  ALPRAZolam 0.25 milliGRAM(s) Oral three times a day  aspirin  chewable 81 milliGRAM(s) Oral daily  ATENolol  Tablet 50 milliGRAM(s) Oral daily  atorvastatin 40 milliGRAM(s) Oral at bedtime  BACItracin   Ointment 1 Application(s) Topical two times a day  dexamethasone     Tablet 4 milliGRAM(s) Oral every 6 hours  dextrose 5%. 1000 milliLiter(s) (50 mL/Hr) IV Continuous <Continuous>  dextrose 50% Injectable 12.5 Gram(s) IV Push once  dextrose 50% Injectable 25 Gram(s) IV Push once  dextrose 50% Injectable 25 Gram(s) IV Push once  enoxaparin Injectable 40 milliGRAM(s) SubCutaneous every 24 hours  gabapentin 300 milliGRAM(s) Oral two times a day  insulin glargine Injectable (LANTUS) 30 Unit(s) SubCutaneous at bedtime  insulin lispro (HumaLOG) corrective regimen sliding scale   SubCutaneous three times a day before meals  insulin lispro Injectable (HumaLOG) 2 Unit(s) SubCutaneous three times a day before meals  mometasone 220 MICROgram(s) Inhaler 2 Puff(s) Inhalation two times a day  nitroglycerin    Patch 0.3 mG/Hr(s) 1 patch Transdermal daily  pantoprazole    Tablet 40 milliGRAM(s) Oral before breakfast  senna 2 Tablet(s) Oral at bedtime  sodium chloride 0.45%. 1000 milliLiter(s) (80 mL/Hr) IV Continuous <Continuous>  tamsulosin 0.4 milliGRAM(s) Oral at bedtime    MEDICATIONS  (PRN):  acetaminophen   Tablet. 975 milliGRAM(s) Oral every 6 hours PRN Mild Pain (1 - 3) or temp 100.3f or above  ALBUTerol    0.083% 2.5 milliGRAM(s) Nebulizer every 6 hours PRN Bronchospasm  ALBUTerol   0.5% 2.5 milliGRAM(s) Nebulizer every 6 hours PRN Shortness of Breath and/or Wheezing  aluminum hydroxide/magnesium hydroxide/simethicone Suspension 30 milliLiter(s) Oral every 12 hours PRN Indigestion  dextrose Gel 1 Dose(s) Oral once PRN Blood Glucose LESS THAN 70 milliGRAM(s)/deciliter  glucagon  Injectable 1 milliGRAM(s) IntraMuscular once PRN Glucose LESS THAN 70 milligrams/deciliter  magnesium hydroxide Suspension 30 milliLiter(s) Oral every 12 hours PRN Constipation  methocarbamol 500 milliGRAM(s) Oral every 6 hours PRN Back Spasms  ondansetron Injectable 4 milliGRAM(s) IV Push every 6 hours PRN Nausea  oxyCODONE    IR 5 milliGRAM(s) Oral every 4 hours PRN Moderate Pain (4 - 6)  oxyCODONE    IR 10 milliGRAM(s) Oral every 4 hours PRN Severe Pain (7 - 10)      Vital Signs Last 24 Hrs  T(C): 36.8 (11 Apr 2018 04:54), Max: 37.2 (10 Apr 2018 16:47)  T(F): 98.3 (11 Apr 2018 04:54), Max: 98.9 (10 Apr 2018 16:47)  HR: 89 (11 Apr 2018 04:54) (71 - 89)  BP: 105/57 (11 Apr 2018 04:54) (105/57 - 130/66)  BP(mean): --  RR: 17 (11 Apr 2018 04:54) (17 - 19)  SpO2: 99% (11 Apr 2018 04:54) (95% - 99%)        PHYSICAL EXAM:     Constitutional: Alert, responsive, in no acute distress.     Spine:          Dressing: incision site Clean/dry/intact WITH DRAIN NOTED - dressing around the drain is saturated with bloody discharge. No active bleeding or discharge noted.            Drains:  present with OUTPUT of 105 ml           Sensation: normal to light touch of the UE's. decreased sensation of both LE's distal to the knees.            Pathologic reflexes: No Clonus                  Motor exam: 3/5 motor noted of the lower extremity hip flexors. 5/5 motor of the ankles with DF and PF. 5/5 distal UE motor function of the wrists and fingers.   No calf tenderness.                                        Vascular:  + warm well perfused; capillary refill <3 seconds       PRIMARY ORTHOPEDIC ISSUE:  * Cervical stenosis with chronic upper extremity myelopathy      SECONDARY MEDICAL ISSUES:  Diabetes: Diabetes  CAD (coronary artery disease): CAD (coronary artery disease)  DJD (degenerative joint disease): DJD (degenerative joint disease)  post-operative anemia                                                        A/P :  72y Male S/P multi-level cervical laminectomy with multi-level posterior cervical and thoracic fusion with history of chronic myelopathy POD# 2    -  Pain control  -  DVT ppx: [x]SCDs   [x] Pharmacolgic    -  PT and out of bed today  -  Weight bearing status: WBAT [X]        PWB    [ ]     TTWB  [ ]      NWB  [ ]  -  Dispo: pending AR evaluation  - dressing changed  - drain was removed - large bulky dressing applied

## 2018-04-11 NOTE — PROGRESS NOTE ADULT - SUBJECTIVE AND OBJECTIVE BOX
HPI:  72 year old male with a history of Diabetes, CAD s/p CABG and multiple stent placements, dyslipidemia, KEVYN, and GERD presents with a cervical spine disorder scheduled for a cervical laminectomy and posterior cervical fusion on 4/9/18. (26 Mar 2018 15:27)     Allergies    penicillin (Other)    Intolerances      Anemia  Ventricular tachycardia  Reactive airway disease  Back pain  Sleep apnea  Tachycardia  Carpal tunnel syndrome, bilateral  DJD (degenerative joint disease)  Umbilical hernia  High cholesterol  CAD (coronary artery disease)  Diabetes  Hypertension    MEDICATIONS  (STANDING):  ALPRAZolam 0.25 milliGRAM(s) Oral three times a day  aspirin  chewable 81 milliGRAM(s) Oral daily  ATENolol  Tablet 50 milliGRAM(s) Oral daily  atorvastatin 40 milliGRAM(s) Oral at bedtime  BACItracin   Ointment 1 Application(s) Topical two times a day  dextrose 5%. 1000 milliLiter(s) (50 mL/Hr) IV Continuous <Continuous>  dextrose 50% Injectable 12.5 Gram(s) IV Push once  dextrose 50% Injectable 25 Gram(s) IV Push once  dextrose 50% Injectable 25 Gram(s) IV Push once  enoxaparin Injectable 40 milliGRAM(s) SubCutaneous every 24 hours  gabapentin 300 milliGRAM(s) Oral two times a day  insulin glargine Injectable (LANTUS) 30 Unit(s) SubCutaneous at bedtime  insulin lispro (HumaLOG) corrective regimen sliding scale   SubCutaneous three times a day before meals  insulin lispro Injectable (HumaLOG) 2 Unit(s) SubCutaneous three times a day before meals  mometasone 220 MICROgram(s) Inhaler 2 Puff(s) Inhalation two times a day  nitroglycerin    Patch 0.3 mG/Hr(s) 1 patch Transdermal daily  pantoprazole    Tablet 40 milliGRAM(s) Oral before breakfast  senna 2 Tablet(s) Oral at bedtime  sodium chloride 0.45%. 1000 milliLiter(s) (80 mL/Hr) IV Continuous <Continuous>  tamsulosin 0.4 milliGRAM(s) Oral at bedtime    MEDICATIONS  (PRN):  acetaminophen   Tablet. 975 milliGRAM(s) Oral every 6 hours PRN Mild Pain (1 - 3) or temp 100.3f or above  ALBUTerol    0.083% 2.5 milliGRAM(s) Nebulizer every 6 hours PRN Bronchospasm  ALBUTerol   0.5% 2.5 milliGRAM(s) Nebulizer every 6 hours PRN Shortness of Breath and/or Wheezing  aluminum hydroxide/magnesium hydroxide/simethicone Suspension 30 milliLiter(s) Oral every 12 hours PRN Indigestion  dextrose Gel 1 Dose(s) Oral once PRN Blood Glucose LESS THAN 70 milliGRAM(s)/deciliter  glucagon  Injectable 1 milliGRAM(s) IntraMuscular once PRN Glucose LESS THAN 70 milligrams/deciliter  magnesium hydroxide Suspension 30 milliLiter(s) Oral every 12 hours PRN Constipation  methocarbamol 500 milliGRAM(s) Oral every 6 hours PRN Back Spasms  ondansetron Injectable 4 milliGRAM(s) IV Push every 6 hours PRN Nausea  oxyCODONE    IR 5 milliGRAM(s) Oral every 4 hours PRN Moderate Pain (4 - 6)  oxyCODONE    IR 10 milliGRAM(s) Oral every 4 hours PRN Severe Pain (7 - 10)                           8.2    12.5  )-----------( 185      ( 11 Apr 2018 08:32 )             24.6     04-11    136  |  98  |  44.0<H>  ----------------------------<  227<H>  4.4   |  25.0  |  0.93    Ca    8.6      11 Apr 2018 06:40        ;  Vital Signs Last 24 Hrs  T(C): 36.8 (11 Apr 2018 16:13), Max: 36.8 (10 Apr 2018 22:45)  T(F): 98.3 (11 Apr 2018 16:13), Max: 98.3 (10 Apr 2018 22:45)  HR: 68 (11 Apr 2018 16:13) (68 - 89)  BP: 133/59 (11 Apr 2018 16:13) (105/57 - 133/59)  BP(mean): --  RR: 16 (11 Apr 2018 16:13) (14 - 19)  SpO2: 95% (11 Apr 2018 16:13) (95% - 99%)  CAPILLARY BLOOD GLUCOSE      04-10 @ 07:01  -  04-11 @ 07:00  --------------------------------------------------------  IN: 0 mL / OUT: 705 mL / NET: -705 mL    04-11 @ 07:01 - 04-11 @ 19:43  --------------------------------------------------------  IN: 80 mL / OUT: 0 mL / NET: 80 mL      Patient feeling better mild neck pain No CP, No SOB, No N/V    HEENT: PEARLA  Neck: Supple Incision clean  Cardio: S1 S2 SE Murmur  Pulm: Good Air entry No Rales No Ronchi  Abd: Soft NT ND BS+  Rectal - refused  Ext: No DCT  Skin: No Rash  Neuro: Awake Pleasant      Cervical Surgery - post op pain control  Anemia - follow up Hb  Prerenal Azotemia - will check stool for occult blood  Ventricular tachycardia - -atenolol  Asthma -- cont meds add prn nebs  Sleep apnea: Intolerant of CPAP  CAD - restart ASA once surgically acceptable  Diabetes - SS and Lantus

## 2018-04-12 VITALS
TEMPERATURE: 98 F | RESPIRATION RATE: 18 BRPM | HEART RATE: 57 BPM | OXYGEN SATURATION: 98 % | DIASTOLIC BLOOD PRESSURE: 60 MMHG | SYSTOLIC BLOOD PRESSURE: 110 MMHG

## 2018-04-12 LAB
ANION GAP SERPL CALC-SCNC: 12 MMOL/L — SIGNIFICANT CHANGE UP (ref 5–17)
BUN SERPL-MCNC: 50 MG/DL — HIGH (ref 8–20)
CALCIUM SERPL-MCNC: 8.5 MG/DL — LOW (ref 8.6–10.2)
CHLORIDE SERPL-SCNC: 102 MMOL/L — SIGNIFICANT CHANGE UP (ref 98–107)
CO2 SERPL-SCNC: 26 MMOL/L — SIGNIFICANT CHANGE UP (ref 22–29)
CREAT SERPL-MCNC: 0.97 MG/DL — SIGNIFICANT CHANGE UP (ref 0.5–1.3)
GLUCOSE BLDC GLUCOMTR-MCNC: 145 MG/DL — HIGH (ref 70–99)
GLUCOSE SERPL-MCNC: 157 MG/DL — HIGH (ref 70–115)
POTASSIUM SERPL-MCNC: 4.4 MMOL/L — SIGNIFICANT CHANGE UP (ref 3.5–5.3)
POTASSIUM SERPL-SCNC: 4.4 MMOL/L — SIGNIFICANT CHANGE UP (ref 3.5–5.3)
SODIUM SERPL-SCNC: 140 MMOL/L — SIGNIFICANT CHANGE UP (ref 135–145)

## 2018-04-12 PROCEDURE — 94640 AIRWAY INHALATION TREATMENT: CPT

## 2018-04-12 PROCEDURE — 76000 FLUOROSCOPY <1 HR PHYS/QHP: CPT

## 2018-04-12 PROCEDURE — 80048 BASIC METABOLIC PNL TOTAL CA: CPT

## 2018-04-12 PROCEDURE — 86850 RBC ANTIBODY SCREEN: CPT

## 2018-04-12 PROCEDURE — 97110 THERAPEUTIC EXERCISES: CPT

## 2018-04-12 PROCEDURE — 85027 COMPLETE CBC AUTOMATED: CPT

## 2018-04-12 PROCEDURE — 86901 BLOOD TYPING SEROLOGIC RH(D): CPT

## 2018-04-12 PROCEDURE — 97116 GAIT TRAINING THERAPY: CPT

## 2018-04-12 PROCEDURE — 97163 PT EVAL HIGH COMPLEX 45 MIN: CPT

## 2018-04-12 PROCEDURE — 97167 OT EVAL HIGH COMPLEX 60 MIN: CPT

## 2018-04-12 PROCEDURE — C1889: CPT

## 2018-04-12 PROCEDURE — 97535 SELF CARE MNGMENT TRAINING: CPT

## 2018-04-12 PROCEDURE — 82962 GLUCOSE BLOOD TEST: CPT

## 2018-04-12 PROCEDURE — C1713: CPT

## 2018-04-12 PROCEDURE — 97530 THERAPEUTIC ACTIVITIES: CPT

## 2018-04-12 PROCEDURE — 86900 BLOOD TYPING SEROLOGIC ABO: CPT

## 2018-04-12 PROCEDURE — 36415 COLL VENOUS BLD VENIPUNCTURE: CPT

## 2018-04-12 RX ORDER — ENOXAPARIN SODIUM 100 MG/ML
40 INJECTION SUBCUTANEOUS
Qty: 0 | Refills: 0 | DISCHARGE
Start: 2018-04-12 | End: 2018-04-19

## 2018-04-12 RX ORDER — ACETAMINOPHEN 500 MG
3 TABLET ORAL
Qty: 0 | Refills: 0 | DISCHARGE
Start: 2018-04-12

## 2018-04-12 RX ORDER — OXYCODONE HYDROCHLORIDE 5 MG/1
1 TABLET ORAL
Qty: 0 | Refills: 0 | DISCHARGE
Start: 2018-04-12

## 2018-04-12 RX ADMIN — PANTOPRAZOLE SODIUM 40 MILLIGRAM(S): 20 TABLET, DELAYED RELEASE ORAL at 05:40

## 2018-04-12 RX ADMIN — Medication 2 UNIT(S): at 08:39

## 2018-04-12 RX ADMIN — OXYCODONE HYDROCHLORIDE 10 MILLIGRAM(S): 5 TABLET ORAL at 02:07

## 2018-04-12 RX ADMIN — Medication 0.25 MILLIGRAM(S): at 05:40

## 2018-04-12 RX ADMIN — Medication 1 APPLICATION(S): at 05:40

## 2018-04-12 RX ADMIN — GABAPENTIN 300 MILLIGRAM(S): 400 CAPSULE ORAL at 05:40

## 2018-04-12 RX ADMIN — OXYCODONE HYDROCHLORIDE 10 MILLIGRAM(S): 5 TABLET ORAL at 01:37

## 2018-04-12 RX ADMIN — Medication 81 MILLIGRAM(S): at 11:01

## 2018-04-12 RX ADMIN — Medication 1 PATCH: at 05:27

## 2018-04-12 RX ADMIN — MOMETASONE FUROATE 2 PUFF(S): 220 INHALANT RESPIRATORY (INHALATION) at 11:04

## 2018-04-12 RX ADMIN — ENOXAPARIN SODIUM 40 MILLIGRAM(S): 100 INJECTION SUBCUTANEOUS at 11:04

## 2018-04-12 RX ADMIN — ATENOLOL 50 MILLIGRAM(S): 25 TABLET ORAL at 05:40

## 2018-04-12 NOTE — PROGRESS NOTE ADULT - SUBJECTIVE AND OBJECTIVE BOX
ORTHO-SPINE POST-OP PROGRESS NOTE:      [id number]    ALESSIA IBARRA      PROCEDURE: multi-level cervical laminectomy with multi-level posterior cervical and thoracic fusion      DOS: 4/9/2018      SUBJECTIVE: 73y Patient seen and examined. Patient reports of minimal discomfort that is controlled by pain medications. Patient has chronic distal UE sensory changes due to carpal tunnel and chronic LE dysesthesia and motor weaknesses. No acute post-operative changes reported.                             8.2    12.5  )-----------( 185      ( 11 Apr 2018 08:32 )             24.6           I&O's Detail    11 Apr 2018 07:01  -  12 Apr 2018 07:00  --------------------------------------------------------  IN:    Oral Fluid: 80 mL  Total IN: 80 mL    OUT:  Total OUT: 0 mL    Total NET: 80 mL            acetaminophen   Tablet. 975 milliGRAM(s) Oral every 6 hours PRN  ALBUTerol    0.083% 2.5 milliGRAM(s) Nebulizer every 6 hours PRN  ALBUTerol   0.5% 2.5 milliGRAM(s) Nebulizer every 6 hours PRN  ALPRAZolam 0.25 milliGRAM(s) Oral three times a day  aluminum hydroxide/magnesium hydroxide/simethicone Suspension 30 milliLiter(s) Oral every 12 hours PRN  aspirin  chewable 81 milliGRAM(s) Oral daily  ATENolol  Tablet 50 milliGRAM(s) Oral daily  atorvastatin 40 milliGRAM(s) Oral at bedtime  BACItracin   Ointment 1 Application(s) Topical two times a day  dextrose 5%. 1000 milliLiter(s) IV Continuous <Continuous>  dextrose 50% Injectable 12.5 Gram(s) IV Push once  dextrose 50% Injectable 25 Gram(s) IV Push once  dextrose 50% Injectable 25 Gram(s) IV Push once  dextrose Gel 1 Dose(s) Oral once PRN  enoxaparin Injectable 40 milliGRAM(s) SubCutaneous every 24 hours  gabapentin 300 milliGRAM(s) Oral two times a day  glucagon  Injectable 1 milliGRAM(s) IntraMuscular once PRN  insulin glargine Injectable (LANTUS) 30 Unit(s) SubCutaneous at bedtime  insulin lispro (HumaLOG) corrective regimen sliding scale   SubCutaneous three times a day before meals  insulin lispro Injectable (HumaLOG) 2 Unit(s) SubCutaneous three times a day before meals  magnesium hydroxide Suspension 30 milliLiter(s) Oral every 12 hours PRN  methocarbamol 500 milliGRAM(s) Oral every 6 hours PRN  mometasone 220 MICROgram(s) Inhaler 2 Puff(s) Inhalation two times a day  nitroglycerin    Patch 0.3 mG/Hr(s) 1 patch Transdermal daily  ondansetron Injectable 4 milliGRAM(s) IV Push every 6 hours PRN  oxyCODONE    IR 5 milliGRAM(s) Oral every 4 hours PRN  oxyCODONE    IR 10 milliGRAM(s) Oral every 4 hours PRN  pantoprazole    Tablet 40 milliGRAM(s) Oral before breakfast  senna 2 Tablet(s) Oral at bedtime  sodium chloride 0.45%. 1000 milliLiter(s) IV Continuous <Continuous>  tamsulosin 0.4 milliGRAM(s) Oral at bedtime        T(C): 37 (04-12-18 @ 04:20), Max: 37 (04-12-18 @ 04:20)  HR: 63 (04-12-18 @ 04:20) (63 - 73)  BP: 111/51 (04-12-18 @ 04:20) (111/51 - 133/59)  RR: 18 (04-12-18 @ 04:20) (14 - 18)  SpO2: 97% (04-12-18 @ 04:20) (95% - 99%)  Wt(kg): --      PHYSICAL EXAM:     Constitutional: Alert, responsive, in no acute distress.     Spine:          Dressing: incision site Clean/dry/intact.  Dressing is dry. No active bleeding or discharge noted.            Sensation: normal to light touch of the UE's.                 Motor exam: 3/5 motor noted of the lower extremity hip flexors. 5/5 motor of the ankles with DF and PF. 5/5 distal UE motor function of the wrists and fingers.   No calf tenderness.                                        Vascular:  + warm well perfused; capillary refill <3 seconds       PRIMARY ORTHOPEDIC ISSUE:  * Cervical stenosis with chronic upper extremity myelopathy      SECONDARY MEDICAL ISSUES:  Diabetes: Diabetes  CAD (coronary artery disease): CAD (coronary artery disease)  DJD (degenerative joint disease): DJD (degenerative joint disease)  post-operative anemia                                                        A/P :  72y Male S/P multi-level cervical laminectomy with multi-level posterior cervical and thoracic fusion with history of chronic myelopathy POD# 3    -  Pain control  -  DVT ppx: [x]SCDs   [x] Pharmacolgic    -  PT and out of bed today  -  Weight bearing status: WBAT [X]        PWB    [ ]     TTWB  [ ]      NWB  [ ]  -  Dispo: pending AR evaluation  - dressing changed  - ortho and medically ready for [facility] today

## 2018-04-12 NOTE — PROGRESS NOTE ADULT - PROVIDER SPECIALTY LIST ADULT
Anesthesia
Family Medicine
Family Medicine
Orthopedics

## 2018-04-25 ENCOUNTER — APPOINTMENT (OUTPATIENT)
Dept: ORTHOPEDIC SURGERY | Facility: CLINIC | Age: 73
End: 2018-04-25
Payer: COMMERCIAL

## 2018-04-25 VITALS
HEIGHT: 72 IN | HEART RATE: 59 BPM | WEIGHT: 194 LBS | BODY MASS INDEX: 26.28 KG/M2 | DIASTOLIC BLOOD PRESSURE: 67 MMHG | SYSTOLIC BLOOD PRESSURE: 104 MMHG

## 2018-04-25 DIAGNOSIS — M47.812 SPONDYLOSIS W/OUT MYELOPATHY OR RADICULOPATHY, CERVICAL REGION: ICD-10-CM

## 2018-04-25 PROCEDURE — 99024 POSTOP FOLLOW-UP VISIT: CPT

## 2018-04-25 PROCEDURE — 72040 X-RAY EXAM NECK SPINE 2-3 VW: CPT

## 2018-05-23 ENCOUNTER — APPOINTMENT (OUTPATIENT)
Dept: ORTHOPEDIC SURGERY | Facility: CLINIC | Age: 73
End: 2018-05-23
Payer: COMMERCIAL

## 2018-05-23 VITALS
SYSTOLIC BLOOD PRESSURE: 91 MMHG | HEART RATE: 54 BPM | WEIGHT: 194 LBS | HEIGHT: 72 IN | DIASTOLIC BLOOD PRESSURE: 47 MMHG | BODY MASS INDEX: 26.28 KG/M2

## 2018-05-23 PROCEDURE — 99024 POSTOP FOLLOW-UP VISIT: CPT

## 2018-05-23 PROCEDURE — 72040 X-RAY EXAM NECK SPINE 2-3 VW: CPT

## 2018-06-29 ENCOUNTER — OUTPATIENT (OUTPATIENT)
Dept: OUTPATIENT SERVICES | Facility: HOSPITAL | Age: 73
LOS: 1 days | End: 2018-06-29
Payer: MEDICARE

## 2018-06-29 DIAGNOSIS — Z98.89 OTHER SPECIFIED POSTPROCEDURAL STATES: Chronic | ICD-10-CM

## 2018-06-29 DIAGNOSIS — Z51.89 ENCOUNTER FOR OTHER SPECIFIED AFTERCARE: ICD-10-CM

## 2018-06-29 DIAGNOSIS — Z96.652 PRESENCE OF LEFT ARTIFICIAL KNEE JOINT: Chronic | ICD-10-CM

## 2018-06-29 DIAGNOSIS — G56.00 CARPAL TUNNEL SYNDROME, UNSPECIFIED UPPER LIMB: Chronic | ICD-10-CM

## 2018-06-29 DIAGNOSIS — Z95.1 PRESENCE OF AORTOCORONARY BYPASS GRAFT: Chronic | ICD-10-CM

## 2018-06-29 DIAGNOSIS — Z12.11 ENCOUNTER FOR SCREENING FOR MALIGNANT NEOPLASM OF COLON: Chronic | ICD-10-CM

## 2018-06-29 DIAGNOSIS — G95.9 DISEASE OF SPINAL CORD, UNSPECIFIED: ICD-10-CM

## 2018-07-25 ENCOUNTER — APPOINTMENT (OUTPATIENT)
Dept: ORTHOPEDIC SURGERY | Facility: CLINIC | Age: 73
End: 2018-07-25
Payer: MEDICARE

## 2018-07-25 VITALS
SYSTOLIC BLOOD PRESSURE: 117 MMHG | HEART RATE: 73 BPM | BODY MASS INDEX: 26.28 KG/M2 | DIASTOLIC BLOOD PRESSURE: 64 MMHG | WEIGHT: 194 LBS | HEIGHT: 72 IN

## 2018-07-25 PROCEDURE — 72040 X-RAY EXAM NECK SPINE 2-3 VW: CPT

## 2018-07-25 PROCEDURE — 99215 OFFICE O/P EST HI 40 MIN: CPT

## 2018-08-20 PROCEDURE — 97112 NEUROMUSCULAR REEDUCATION: CPT

## 2018-08-20 PROCEDURE — 97530 THERAPEUTIC ACTIVITIES: CPT

## 2018-08-20 PROCEDURE — G8978: CPT | Mod: CM

## 2018-08-20 PROCEDURE — 97110 THERAPEUTIC EXERCISES: CPT

## 2018-08-20 PROCEDURE — G8979: CPT | Mod: CK

## 2018-08-20 PROCEDURE — 97163 PT EVAL HIGH COMPLEX 45 MIN: CPT

## 2018-09-12 ENCOUNTER — OTHER (OUTPATIENT)
Age: 73
End: 2018-09-12

## 2018-10-26 ENCOUNTER — APPOINTMENT (OUTPATIENT)
Dept: ORTHOPEDIC SURGERY | Facility: CLINIC | Age: 73
End: 2018-10-26

## 2018-11-01 ENCOUNTER — APPOINTMENT (OUTPATIENT)
Dept: ORTHOPEDIC SURGERY | Facility: CLINIC | Age: 73
End: 2018-11-01
Payer: MEDICARE

## 2018-11-01 DIAGNOSIS — M47.817 SPONDYLOSIS W/OUT MYELOPATHY OR RADICULOPATHY, LUMBOSACRAL REGION: ICD-10-CM

## 2018-11-01 DIAGNOSIS — M48.07 SPINAL STENOSIS, LUMBOSACRAL REGION: ICD-10-CM

## 2018-11-01 PROCEDURE — 72040 X-RAY EXAM NECK SPINE 2-3 VW: CPT

## 2018-11-01 PROCEDURE — 72070 X-RAY EXAM THORAC SPINE 2VWS: CPT

## 2018-11-01 PROCEDURE — 99215 OFFICE O/P EST HI 40 MIN: CPT

## 2018-11-09 ENCOUNTER — OUTPATIENT (OUTPATIENT)
Dept: OUTPATIENT SERVICES | Facility: HOSPITAL | Age: 73
LOS: 1 days | End: 2018-11-09
Payer: MEDICARE

## 2018-11-09 DIAGNOSIS — G95.9 DISEASE OF SPINAL CORD, UNSPECIFIED: ICD-10-CM

## 2018-11-09 DIAGNOSIS — Z98.89 OTHER SPECIFIED POSTPROCEDURAL STATES: Chronic | ICD-10-CM

## 2018-11-09 DIAGNOSIS — Z51.89 ENCOUNTER FOR OTHER SPECIFIED AFTERCARE: ICD-10-CM

## 2018-11-09 DIAGNOSIS — Z96.652 PRESENCE OF LEFT ARTIFICIAL KNEE JOINT: Chronic | ICD-10-CM

## 2018-11-09 DIAGNOSIS — Z95.1 PRESENCE OF AORTOCORONARY BYPASS GRAFT: Chronic | ICD-10-CM

## 2018-11-09 DIAGNOSIS — G56.00 CARPAL TUNNEL SYNDROME, UNSPECIFIED UPPER LIMB: Chronic | ICD-10-CM

## 2018-11-09 DIAGNOSIS — Z12.11 ENCOUNTER FOR SCREENING FOR MALIGNANT NEOPLASM OF COLON: Chronic | ICD-10-CM

## 2018-11-09 DIAGNOSIS — M48.07 SPINAL STENOSIS, LUMBOSACRAL REGION: ICD-10-CM

## 2019-02-07 ENCOUNTER — APPOINTMENT (OUTPATIENT)
Dept: ORTHOPEDIC SURGERY | Facility: CLINIC | Age: 74
End: 2019-02-07

## 2019-02-15 ENCOUNTER — APPOINTMENT (OUTPATIENT)
Dept: ORTHOPEDIC SURGERY | Facility: CLINIC | Age: 74
End: 2019-02-15
Payer: MEDICARE

## 2019-02-15 VITALS
HEIGHT: 72 IN | HEART RATE: 67 BPM | BODY MASS INDEX: 26.28 KG/M2 | WEIGHT: 194 LBS | DIASTOLIC BLOOD PRESSURE: 58 MMHG | SYSTOLIC BLOOD PRESSURE: 117 MMHG

## 2019-02-15 PROCEDURE — 99214 OFFICE O/P EST MOD 30 MIN: CPT

## 2019-02-15 PROCEDURE — 72040 X-RAY EXAM NECK SPINE 2-3 VW: CPT

## 2019-02-15 NOTE — PHYSICAL EXAM
[Poor Appearance] : well-appearing [Acute Distress] : not in acute distress [Obese] : not obese [de-identified] : CONSTITUTIONAL: Patient is a very pleasant individual who is well-nourished and appears stated age. \par PSYCHIATRIC: Alert and oriented times three and in no apparent distress, and participates with orthopedic evaluation well.\par HEAD: Atraumatic and nonsyndromic in appearance.\par EENT: No thyromegaly, EOMI.\par RESPIRATORY: Respiratory rate is regular, not dyspneic on examination.\par LYMPHATICS: There is no cervical or axillary lymphadenopathy.\par INTEGUMENTARY: Skin is clean, dry, and intact about the bilateral upper extremities and cervical spine. \par VASCULAR: There is brisk capillary refill about the bilateral upper extremities and radial pulses are 2/4. \par NEUROLOGIC: Negative L'hirmitte, negative Spurling’s sign. There are no pathologic reflexes. There is no decrease in sensation of the bilateral upper extremities on Wartenberg pinwheel examination. Deep tendon reflexes are well-maintained at +2/4 of the bilateral upper extremities and are symmetric.\par MUSCULOSKELETAL: Manual motor strength is well maintained in the bilateral upper extremities. Cervical range of motion is well maintained. Normal secondary orthopaedic exam of bilateral shoulders, elbows and hands. Elbow flexion and extension, wrist extension, finger flexion and abduction are well maintained. \par \par no pathologic reflexes. he is ambulating with a rolling walker. some b/l hand intrinsic atrophy, which I think may be related to some underlying DDD as well as his myelopathy.  [de-identified] : X-ray of the cervical spine taken today shows s/p anterior posterior cervical fusion. Implants appear stable. \par \par MRI taken on 1/2018 taken at [facility] shows some moderate stenosis at L4-L5.

## 2019-02-15 NOTE — ADDENDUM
[FreeTextEntry1] : Documented by Jus De Jesus acting as a scribe for Dr. Jacky Fuller on 02/15/2019 . All medical record entries made by the Scribe were at my, Dr. Jacky Fuller, direction and personally dictated by me on 02/15/2019 . I have reviewed the chart and agree that the record accurately reflects my personal performance of the history, physical exam, assessment and plan. I have also personally directed, reviewed, and agreed with the chart.

## 2019-02-15 NOTE — HISTORY OF PRESENT ILLNESS
[de-identified] : 72 y/o M s/p posterior cervical laminectomy and fusion from April 9, 2018 for myelopathy. It does not appear his myelopathy is progressive, but he is still using a walker. I do empathize with his frustration for difficulty walking, but I did explain as long it is not progressive, we have stabilized his myelopathy. He does have a live in aid that helps take care of him. [Ataxia] : no ataxia [Incontinence] : no incontinence [Loss of Dexterity] : good dexterity [Urinary Ret.] : no urinary retention

## 2019-02-15 NOTE — DISCUSSION/SUMMARY
[de-identified] : I have recommended that the pt continue with a conservative treatment plan. Pt has been referred to physical therapy for decreased pain modalities, gait and balance straining, strength training, soft tissue modalities, and physical modalities. The patient will follow up in 3 months for a repeat clinical assessment.

## 2019-05-01 PROCEDURE — 97110 THERAPEUTIC EXERCISES: CPT

## 2019-05-01 PROCEDURE — 97116 GAIT TRAINING THERAPY: CPT | Mod: KX

## 2019-05-01 PROCEDURE — G8978: CPT | Mod: CL

## 2019-05-01 PROCEDURE — 97530 THERAPEUTIC ACTIVITIES: CPT | Mod: KX

## 2019-05-01 PROCEDURE — 97112 NEUROMUSCULAR REEDUCATION: CPT | Mod: KX

## 2019-05-01 PROCEDURE — G8979: CPT | Mod: CK

## 2019-05-01 PROCEDURE — 97163 PT EVAL HIGH COMPLEX 45 MIN: CPT | Mod: KX

## 2019-05-17 ENCOUNTER — APPOINTMENT (OUTPATIENT)
Dept: ORTHOPEDIC SURGERY | Facility: CLINIC | Age: 74
End: 2019-05-17

## 2019-06-04 ENCOUNTER — APPOINTMENT (OUTPATIENT)
Dept: ORTHOPEDIC SURGERY | Facility: CLINIC | Age: 74
End: 2019-06-04
Payer: MEDICARE

## 2019-06-04 VITALS
BODY MASS INDEX: 26.28 KG/M2 | DIASTOLIC BLOOD PRESSURE: 61 MMHG | SYSTOLIC BLOOD PRESSURE: 114 MMHG | HEIGHT: 72 IN | WEIGHT: 194 LBS | HEART RATE: 69 BPM

## 2019-06-04 PROCEDURE — 99214 OFFICE O/P EST MOD 30 MIN: CPT

## 2019-06-04 NOTE — DISCUSSION/SUMMARY
[de-identified] : I have recommended that the pt continue with a conservative treatment plan. This patient was gradually improving with PT, and he would like to start PT again. Pt has been referred to physical therapy for decreased pain modalities, core strengthening modalities, soft tissue modalities, and physical modalities. The patient will follow up in 6 months for a repeat clinical assessment.

## 2019-06-04 NOTE — PHYSICAL EXAM
[Poor Appearance] : well-appearing [Acute Distress] : not in acute distress [Obese] : not obese [de-identified] : CONSTITUTIONAL: Patient is a very pleasant individual who is well-nourished and appears stated age. \par PSYCHIATRIC: Alert and oriented times three and in no apparent distress, and participates with orthopedic evaluation well.\par HEAD: Atraumatic and nonsyndromic in appearance.\par EENT: No thyromegaly, EOMI.\par RESPIRATORY: Respiratory rate is regular, not dyspneic on examination.\par LYMPHATICS: There is no cervical or axillary lymphadenopathy.\par INTEGUMENTARY: Skin is clean, dry, and intact about the bilateral upper extremities and cervical spine. \par VASCULAR: There is brisk capillary refill about the bilateral upper extremities and radial pulses are 2/4. \par NEUROLOGIC: Negative L'hirmitte, negative Spurling’s sign. There are no pathologic reflexes. There is no decrease in sensation of the bilateral upper extremities on Wartenberg pinwheel examination. Deep tendon reflexes are well-maintained at +2/4 of the bilateral upper extremities and are symmetric.\par MUSCULOSKELETAL: Manual motor strength is well maintained in the bilateral upper extremities. Cervical range of motion is well maintained. Normal secondary orthopaedic exam of bilateral shoulders, elbows and hands. Elbow flexion and extension, wrist extension, finger flexion and abduction are well maintained. \par \par no pathologic reflexes. he is ambulating with a rolling walker. some b/l hand intrinsic atrophy, which I think may be related to some underlying DDD as well as his myelopathy.

## 2019-06-04 NOTE — HISTORY OF PRESENT ILLNESS
[Physical Therapy] : relieved by physical therapy [de-identified] : 74 year old M presents with cervical spine pain. He stopped PT 6 weeks ago, and would like to start PT again. He states he was improving with PT. He is s/p posterior cervical laminectomy and fusion at C3, C4, C5, C6, C7, T1, T2. DOS: 4/9/18. [Ataxia] : no ataxia [Incontinence] : no incontinence [Urinary Ret.] : no urinary retention [Loss of Dexterity] : good dexterity

## 2019-06-04 NOTE — ADDENDUM
[FreeTextEntry1] : Documented by Jus De Jesus acting as a scribe for Dr. Jacky Fuller on 06/04/2019 . All medical record entries made by the Scribe were at my, Dr. Jacky Fuller, direction and personally dictated by me on 06/04/2019 . I have reviewed the chart and agree that the record accurately reflects my personal performance of the history, physical exam, assessment and plan. I have also personally directed, reviewed, and agreed with the chart.

## 2019-06-14 ENCOUNTER — OUTPATIENT (OUTPATIENT)
Dept: OUTPATIENT SERVICES | Facility: HOSPITAL | Age: 74
LOS: 1 days | End: 2019-06-14
Payer: MEDICARE

## 2019-06-14 DIAGNOSIS — G56.00 CARPAL TUNNEL SYNDROME, UNSPECIFIED UPPER LIMB: Chronic | ICD-10-CM

## 2019-06-14 DIAGNOSIS — Z98.89 OTHER SPECIFIED POSTPROCEDURAL STATES: Chronic | ICD-10-CM

## 2019-06-14 DIAGNOSIS — Z51.89 ENCOUNTER FOR OTHER SPECIFIED AFTERCARE: ICD-10-CM

## 2019-06-14 DIAGNOSIS — Z12.11 ENCOUNTER FOR SCREENING FOR MALIGNANT NEOPLASM OF COLON: Chronic | ICD-10-CM

## 2019-06-14 DIAGNOSIS — Z95.1 PRESENCE OF AORTOCORONARY BYPASS GRAFT: Chronic | ICD-10-CM

## 2019-06-14 DIAGNOSIS — Z96.652 PRESENCE OF LEFT ARTIFICIAL KNEE JOINT: Chronic | ICD-10-CM

## 2019-06-14 DIAGNOSIS — M47.812 SPONDYLOSIS WITHOUT MYELOPATHY OR RADICULOPATHY, CERVICAL REGION: ICD-10-CM

## 2019-06-26 PROCEDURE — 97162 PT EVAL MOD COMPLEX 30 MIN: CPT | Mod: KX

## 2019-06-26 PROCEDURE — 97110 THERAPEUTIC EXERCISES: CPT | Mod: KX

## 2019-12-06 ENCOUNTER — APPOINTMENT (OUTPATIENT)
Dept: ORTHOPEDIC SURGERY | Facility: CLINIC | Age: 74
End: 2019-12-06
Payer: MEDICARE

## 2019-12-06 VITALS
DIASTOLIC BLOOD PRESSURE: 61 MMHG | HEIGHT: 72 IN | SYSTOLIC BLOOD PRESSURE: 123 MMHG | WEIGHT: 194 LBS | HEART RATE: 71 BPM | BODY MASS INDEX: 26.28 KG/M2

## 2019-12-06 PROCEDURE — 99214 OFFICE O/P EST MOD 30 MIN: CPT

## 2019-12-06 NOTE — HISTORY OF PRESENT ILLNESS
[Physical Therapy] : relieved by physical therapy [Ataxia] : no ataxia [Incontinence] : no incontinence [de-identified] : 74 year old M presents with cervical spine pain follow up. He is s/p posterior cervical laminectomy and fusion at C3, C4, C5, C6, C7, T1, T2. DOS: 4/09/18 for myelopathy.  He reports he has not been in PT recently secondary to insurance/financial restrictions. Overall, he notes his symptoms are stable compared to last visit. Pt ambulates with a rolling walker. [Urinary Ret.] : no urinary retention [Loss of Dexterity] : good dexterity

## 2019-12-06 NOTE — REVIEW OF SYSTEMS
[Joint Pain] : joint pain [Negative] : Heme/Lymph [Fever] : no fever [Cough] : no cough [SOB on Exertion] : no shortness of breath on exertion [Chills] : no chills

## 2019-12-06 NOTE — DISCUSSION/SUMMARY
[de-identified] : I have recommended that the pt continue with a conservative treatment plan. Pt has been referred back to physical therapy for decreased pain modalities, core strengthening modalities and physical modalities. He may follow up in 6 months-1 year for repeat clinical evaluation.

## 2019-12-06 NOTE — ADDENDUM
[FreeTextEntry1] : Documented by Myah Chisholm acting as a scribe for Dr. Jacky Fuller. 12/06/2019\par \par All medical record entries made by the Scribe were at my, Dr. Jacky Fuller, direction and personally dictated by me on 12/06/2019. I have reviewed the chart and agree that the record accurately reflects my personal performance of the history, physical exam, assessment and plan. I have also personally directed, reviewed, and agreed with the chart.

## 2020-03-10 ENCOUNTER — EMERGENCY (EMERGENCY)
Facility: HOSPITAL | Age: 75
LOS: 1 days | Discharge: HOME | End: 2020-03-10
Attending: EMERGENCY MEDICINE
Payer: MEDICARE

## 2020-03-10 VITALS
SYSTOLIC BLOOD PRESSURE: 127 MMHG | DIASTOLIC BLOOD PRESSURE: 69 MMHG | OXYGEN SATURATION: 97 % | TEMPERATURE: 98 F | WEIGHT: 225.09 LBS | HEIGHT: 71 IN | RESPIRATION RATE: 16 BRPM | HEART RATE: 64 BPM

## 2020-03-10 DIAGNOSIS — Z96.652 PRESENCE OF LEFT ARTIFICIAL KNEE JOINT: Chronic | ICD-10-CM

## 2020-03-10 DIAGNOSIS — Z98.89 OTHER SPECIFIED POSTPROCEDURAL STATES: Chronic | ICD-10-CM

## 2020-03-10 DIAGNOSIS — G56.00 CARPAL TUNNEL SYNDROME, UNSPECIFIED UPPER LIMB: Chronic | ICD-10-CM

## 2020-03-10 DIAGNOSIS — Z95.1 PRESENCE OF AORTOCORONARY BYPASS GRAFT: Chronic | ICD-10-CM

## 2020-03-10 DIAGNOSIS — Z12.11 ENCOUNTER FOR SCREENING FOR MALIGNANT NEOPLASM OF COLON: Chronic | ICD-10-CM

## 2020-03-10 PROCEDURE — 99282 EMERGENCY DEPT VISIT SF MDM: CPT | Mod: 25

## 2020-03-10 PROCEDURE — G0168: CPT

## 2020-03-10 PROCEDURE — 12002 RPR S/N/AX/GEN/TRNK2.6-7.5CM: CPT

## 2020-03-10 PROCEDURE — 99283 EMERGENCY DEPT VISIT LOW MDM: CPT | Mod: 25

## 2020-03-10 NOTE — ED PROVIDER NOTE - PATIENT PORTAL LINK FT
You can access the FollowMyHealth Patient Portal offered by [facility] by registering at the following website: [email or web address]. By joining [facility]’s FollowMyHealth portal, you will also be able to view your health information using other applications (apps) compatible with our system.

## 2020-03-10 NOTE — ED PROVIDER NOTE - PROGRESS NOTE DETAILS
Area cleaned and Dermabond applied. pt tolerated well and bleeding stopped. Pt has been instructed to keep area clean, dry and no moisture for next 24-48hrs.

## 2020-03-10 NOTE — ED PROVIDER NOTE - OBJECTIVE STATEMENT
74 yr old M presented to ED with Aid for a skin tear to left buttocks. Pt states that he was using his toilet and when he got up he felt a tear to her buttocks and it was bleeding all over the place. Pt denies been on blood thinners but says he takes an Asprin daily. Pt also admits to discomfort with sitting at the site of the tear. Pt denies any other issues at this time.

## 2020-03-10 NOTE — ED PROVIDER NOTE - PRINCIPAL DIAGNOSIS
Skin tear of left lower leg without complication, initial encounter Laceration of left buttock, initial encounter

## 2020-03-10 NOTE — ED PROVIDER NOTE - CARE PLAN
Principal Discharge DX:	Skin tear of left lower leg without complication, initial encounter Principal Discharge DX:	Laceration of left buttock, initial encounter

## 2020-03-10 NOTE — ED PROVIDER NOTE - CLINICAL SUMMARY MEDICAL DECISION MAKING FREE TEXT BOX
74 yr old m presented to ED with Aid for a skin tear to left buttocks. Pt states that he was using his toilet and when he got up he felt a tear to her buttocks and it was bleeding all over the place. Pt denies been on blood thinners but says he takes an Asprin daily. Examination + skin tear and area Dermabond applied.. Pt tolerated well and D/C in stable condition/.

## 2020-03-10 NOTE — ED ADULT TRIAGE NOTE - CHIEF COMPLAINT QUOTE
Patient A&Ox4 complaining of bleeding from skin tear on buttocks that began at 0700. Is on aspirin daily. Stated got worse after taking band aid off.

## 2020-06-09 ENCOUNTER — APPOINTMENT (OUTPATIENT)
Dept: ORTHOPEDIC SURGERY | Facility: CLINIC | Age: 75
End: 2020-06-09
Payer: MEDICARE

## 2020-06-09 VITALS
WEIGHT: 194 LBS | SYSTOLIC BLOOD PRESSURE: 140 MMHG | BODY MASS INDEX: 26.28 KG/M2 | HEART RATE: 64 BPM | DIASTOLIC BLOOD PRESSURE: 64 MMHG | HEIGHT: 72 IN

## 2020-06-09 VITALS — TEMPERATURE: 97.9 F

## 2020-06-09 DIAGNOSIS — Z82.61 FAMILY HISTORY OF ARTHRITIS: ICD-10-CM

## 2020-06-09 DIAGNOSIS — Z84.89 FAMILY HISTORY OF OTHER SPECIFIED CONDITIONS: ICD-10-CM

## 2020-06-09 PROCEDURE — 72040 X-RAY EXAM NECK SPINE 2-3 VW: CPT

## 2020-06-09 PROCEDURE — 99215 OFFICE O/P EST HI 40 MIN: CPT

## 2020-06-09 NOTE — HISTORY OF PRESENT ILLNESS
[de-identified] : 75 year old M presents with cervical spine pain follow up. He is s/p posterior cervical laminectomy and fusion at C3, C4, C5, C6, C7, T1, T2. DOS: 4/09/18 for myelopathy. He reports he has not been in PT recently secondary to insurance/financial restrictions. Overall, he notes his symptoms are stable compared to last visit. Pt ambulates with a rolling walker.  [Ataxia] : no ataxia [Incontinence] : no incontinence [Loss of Dexterity] : good dexterity [Urinary Ret.] : no urinary retention

## 2020-06-09 NOTE — DISCUSSION/SUMMARY
[de-identified] : I have recommended that the pt continue with a conservative treatment plan. Pt is being referred to [facility] for gait and balance training. The patient will follow up in 6 months to 1 year for a repeat clinical assessment.\par \par Patient with multiple medical comorbidity increasing the risk of perioperative and postoperative complications as well as diminished spine outcomes as per the current medical literature. These include but are not limited to obesity, anxiety/depression, cardiac illness, kidney disease, peripheral vascular disease, history of cancer, COPD, dysmetabolic syndrome including but not limited to diabetes, hyperlipidemia, hypertension. Patient is being referred back to primary care provider for medical optimization, as well as other appropriate specialists as needed for optimization prior to spine surgery. \par

## 2020-06-15 ENCOUNTER — APPOINTMENT (OUTPATIENT)
Dept: ORTHOPEDIC SURGERY | Facility: CLINIC | Age: 75
End: 2020-06-15
Payer: MEDICARE

## 2020-06-15 VITALS
HEART RATE: 78 BPM | HEIGHT: 72 IN | DIASTOLIC BLOOD PRESSURE: 69 MMHG | BODY MASS INDEX: 31.15 KG/M2 | WEIGHT: 230 LBS | SYSTOLIC BLOOD PRESSURE: 135 MMHG

## 2020-06-15 DIAGNOSIS — M25.561 PAIN IN RIGHT KNEE: ICD-10-CM

## 2020-06-15 PROCEDURE — 99214 OFFICE O/P EST MOD 30 MIN: CPT | Mod: 25

## 2020-06-15 PROCEDURE — 73562 X-RAY EXAM OF KNEE 3: CPT | Mod: RT

## 2020-06-15 PROCEDURE — 20610 DRAIN/INJ JOINT/BURSA W/O US: CPT | Mod: RT

## 2020-06-15 NOTE — PHYSICAL EXAM
[Walker] : ambulates with walker [de-identified] : GENERAL APPEARANCE:   Well nourished and hydrated, pleasant, alert, and oriented x 4.  \par CARDIOVASCULAR:   No apparent abnormalities.  No lower leg edema.  No varicosities.  Pedal pulses are palpable.\par RESPIRATORY: Breathe sound clear and audible in all lobes. No wheezing, No accessory muscle use.\par NEUROLOGIC:   Sensation is normal, no muscle weakness in the upper or lower extremities.\par DERMATOLOGIC:   No apparent skin lesions, moist, warm, no rash.\par SPINE:   Cervical spine appears normal and moves freely; thoracic spine appears normal and moves freely; lumbosacral spine appears normal and moves freely, normal, nontender.\par MUSCULOSKELETAL:   Hands, wrists, and elbows are normal and move freely, shoulders are normal and move freely. \par  [de-identified] : right knee examination shows knees and severe varus alignment medial joint line tenderness his active range of motion is a ° \par left knee midline incision is healed he has no joint effusion no erythema active range of motion is 5-110°.  [de-identified] : 3 view X-ray of left knee demonstrate implants are in good alignment. No evidence of subsidence or loosening or wear \par Right knee x-ray demonstrate medial compartment bone-on-bone severe osteoarthritis with varus deformity

## 2020-06-15 NOTE — END OF VISIT
[FreeTextEntry3] : I, Shay Light, acted solely as a scribe for Dr. Arie Randall on this date 06/15/2020 .

## 2020-06-15 NOTE — HISTORY OF PRESENT ILLNESS
[Pain Location] : pain [Worsening] : worsening [___ wks] : [unfilled] week(s) ago [Walking] : worsened by walking [4] : a current pain level of 4/10 [Rest] : relieved by rest [de-identified] : 74 y/o male presents to office for evaluation of right knee pain that has been persistent for the last 3 weeks. The pain is mainly in the medial aspect of the knee. Pt had left TKA in 2015 with good outcome.  Pt reports more pain with standing. He states that he had right knee scope many years ago. Pt reports that he has spine issues and left sided weakness. He takes tramadol, and is being followed by pain management dr inman.\par

## 2020-06-15 NOTE — PROCEDURE
[de-identified] : patient received a right knee cortisone injection\par \par I discussed at length with the patient the planned steroid and lidocaine injection for primary osteoarthritis. The risks, benefits, convalescence and alternatives were reviewed and pt consented for injection. The possible side effects discussed included but were not limited to: pain, swelling, heat, bleeding, and redness. Symptoms are generally mild but if they are extensive then contact the office. Giving pain relievers by mouth such as NSAIDs or Tylenol can generally treat the reactions to steroid and lidocaine. Rare cases of infection have been noted. Rash, hives and itching may occur post injection. If you have muscle pain or cramps, flushing and or swelling of the face, rapid heart beat, nausea, dizziness, fever, chills, headache, difficulty breathing, swelling in the arms or legs, or have a prickly feeling of your skin, contact a health care provider immediately. Following this discussion, the knee was prepped with Alcohol and under sterile condition the 80 mg Depo-Medrol and 6 cc Lidocaine injection was performed with a 20 gauge needle through a superolateral injection site. The needle was introduced into the joint, aspiration was performed to ensure intra-articular placement and the medication was injected. Upon withdrawal of the needle the site was cleaned with alcohol and a band aid applied. The patient tolerated the injection well and there were no adverse effects. Post injection instructions included no strenuous activity for 24 hours, cryotherapy and if there are any adverse effects to contact the office.\par

## 2020-06-15 NOTE — DISCUSSION/SUMMARY
[de-identified] : 76 y/o M with right knee medial compartment bone-on-bone severe osteoarthritis with varus deformity. Pt is also s/p left TKA from 2015. Conservative therapy and surgical options discussed in detail with pt. Pt is a candidate for right knee replacement. Pt reports that he had some difficulty with his left knee replacement, and does not feel that he wants to pursue a right knee replacement at this time. Pt elected to receive a right knee cortisone injection in the office today, which he tolerated well. F/u in 3 months for repeat cortisone if needed.\par \par The patient is a 75 year old individual with end stage arthritis of their right knee joint. Based upon the patient's continued symptoms and failure to respond to conservative treatment I have recommended a staged right total knee arthroplasty for this patient. A long discussion took place with the patient describing what a total joint replacement is and what the procedure would entail. A total knee arthroplasty model, similar to the implant that will be used during the operation, was utilized to demonstrate and to discuss the various bearing surfaces of the implants. The hospitalization and post-operative care and rehabilitation were also discussed. The use of perioperative antibiotics and DVT prophylaxis were discussed. The risk, benefits and alternatives to a surgical intervention were discussed at length with the patient. The patient was also advised of risks related to the medical comorbidities, elevated body mass index (BMI), and smoking where applicable. We discussed how to reduce modifiable risk factors and encouraged smoking cessation were applicable.. A lengthy discussion took place to review the most common complications including but not limited to: deep vein thrombosis, pulmonary embolus, heart attack, stroke, infection, wound breakdown, numbness, damage to nerves, tendon, muscles, arteries or other blood vessels, death and other possible complications from anesthesia. The patient was told that we will take steps to minimize these risks by using sterile technique, antibiotics and DVT prophylaxis when appropriate and follow the patient postoperatively in the office setting to monitor progress. The possibility of recurrent pain, no improvement in pain and actual worsening of pain were also discussed with the patient.\par The discharge plan of care focused on the patient going home following surgery. The patient was encouraged to make the necessary arrangements to have someone stay with them when they are discharged home. Following discharge, a home care nurse will visit the patient. The home care nurse will open your home care case and request home physical therapy services. Home physical therapy will commence following discharge provided it is appropriate and covered by the health insurance benefit plan. \par The benefits of surgery were discussed with the patient including the potential for improving his current clinical condition through operative intervention. Alternatives to surgical intervention including continued conservative management were also discussed in detail. All questions were answered to the satisfaction of the patient. The treatment plan of care, as well as a model of a total knee arthroplasty equivalent to the one that will be used for their total joint replacement, was shared with the patient. The patient agreed to the plan of care as well as the use of implants in their total joint replacement.

## 2020-06-15 NOTE — REASON FOR VISIT
[Initial Visit] : an initial visit for [Family Member] : family member [FreeTextEntry2] : Right knee pain

## 2020-09-12 NOTE — PHYSICAL THERAPY INITIAL EVALUATION ADULT - LEVEL OF INDEPENDENCE: STAND/SIT, REHAB EVAL
[FreeTextEntry1] : Complete skin examination is negative for malignancy;\par Continue regular exams; Hx BCC;  f/u annually\par Prob. patchy AKs on nose bridge; used 5FU too sparingly in 2019; did better on chest\par \par  5FU x 2 weeks; Risks and benefits of topical actinic keratosis treatments were discussed including redness, scaling, discomfort crusting, oozing, and recurrence.  minimum assist (75% patients effort)

## 2020-09-14 ENCOUNTER — APPOINTMENT (OUTPATIENT)
Dept: ORTHOPEDIC SURGERY | Facility: CLINIC | Age: 75
End: 2020-09-14
Payer: MEDICARE

## 2020-09-14 VITALS — TEMPERATURE: 97.3 F

## 2020-09-14 PROCEDURE — 20610 DRAIN/INJ JOINT/BURSA W/O US: CPT

## 2020-09-14 PROCEDURE — 99214 OFFICE O/P EST MOD 30 MIN: CPT | Mod: 25

## 2020-09-14 NOTE — END OF VISIT
[FreeTextEntry3] : I, Geovani Jacobson, acted solely as a scribe for Dr. Arie Randall on this date 09/14/2020.

## 2020-09-14 NOTE — PHYSICAL EXAM
[Walker] : ambulates with walker [UE/LE] : Motor: 5/5 motor strength in bilateral upper & lower extremities [de-identified] : right knee examination shows knees and severe varus alignment medial joint line tenderness his active range of motion is a ° \par left knee midline incision is healed he has no joint effusion no erythema active range of motion is 5-110°.  [de-identified] : GENERAL APPEARANCE:   Well nourished and hydrated, pleasant, alert, and oriented x 4.  \par CARDIOVASCULAR:   No apparent abnormalities.  No lower leg edema.  No varicosities.  Pedal pulses are palpable.\par RESPIRATORY: Breathe sound clear and audible in all lobes. No wheezing, No accessory muscle use.\par NEUROLOGIC:   Sensation is normal, no muscle weakness in the upper or lower extremities.\par DERMATOLOGIC:   No apparent skin lesions, moist, warm, no rash.\par SPINE:   Cervical spine appears normal and moves freely; thoracic spine appears normal and moves freely; lumbosacral spine appears normal and moves freely, normal, nontender.\par MUSCULOSKELETAL:   Hands, wrists, and elbows are normal and move freely, shoulders are normal and move freely. \par  [ALL] : dorsalis pedis, posterior tibial, femoral, popliteal, and radial 2+ and symmetric bilaterally [de-identified] : 3 view X-ray of left knee from June 2020 demonstrate implants are in good alignment. No evidence of subsidence or loosening or wear \par Right knee x-ray demonstrate from June 2020 demonstrates medial compartment bone-on-bone severe osteoarthritis with varus deformity

## 2020-09-14 NOTE — PROCEDURE
[de-identified] : patient received a right knee cortisone injection\par \par I discussed at length with the patient the planned steroid and lidocaine injection for primary osteoarthritis. The risks, benefits, convalescence and alternatives were reviewed and pt consented for injection. The possible side effects discussed included but were not limited to: pain, swelling, heat, bleeding, and redness. Symptoms are generally mild but if they are extensive then contact the office. Giving pain relievers by mouth such as NSAIDs or Tylenol can generally treat the reactions to steroid and lidocaine. Rare cases of infection have been noted. Rash, hives and itching may occur post injection. If you have muscle pain or cramps, flushing and or swelling of the face, rapid heart beat, nausea, dizziness, fever, chills, headache, difficulty breathing, swelling in the arms or legs, or have a prickly feeling of your skin, contact a health care provider immediately. Following this discussion, the knee was prepped with Alcohol and under sterile condition the 80 mg Depo-Medrol and 6 cc Lidocaine injection was performed with a 20 gauge needle through a superolateral injection site. The needle was introduced into the joint, aspiration was performed to ensure intra-articular placement and the medication was injected. Upon withdrawal of the needle the site was cleaned with alcohol and a band aid applied. The patient tolerated the injection well and there were no adverse effects. Post injection instructions included no strenuous activity for 24 hours, cryotherapy and if there are any adverse effects to contact the office.\par

## 2020-09-14 NOTE — DISCUSSION/SUMMARY
[de-identified] : 74 y/o M with right knee medial compartment bone-on-bone severe osteoarthritis with varus deformity. Pt is also s/p left TKA from 2015. Conservative therapy and surgical options discussed in detail with pt. Pt is a candidate for right knee replacement. Pt reports that he had some difficulty with his left knee replacement, and does not feel that he wants to pursue a right knee replacement at this time. Pt elected to receive a right knee cortisone injection in the office today, which he tolerated well. F/u in 3 months for repeat cortisone if needed.\par \par \par The patient is a 75 year old individual with end stage arthritis of their right knee joint. Based upon the patient's continued symptoms and failure to respond to conservative treatment I have recommended a staged right total knee arthroplasty for this patient. A long discussion took place with the patient describing what a total joint replacement is and what the procedure would entail. A total knee arthroplasty model, similar to the implant that will be used during the operation, was utilized to demonstrate and to discuss the various bearing surfaces of the implants. The hospitalization and post-operative care and rehabilitation were also discussed. The use of perioperative antibiotics and DVT prophylaxis were discussed. The risk, benefits and alternatives to a surgical intervention were discussed at length with the patient. The patient was also advised of risks related to the medical comorbidities, elevated body mass index (BMI), and smoking where applicable. We discussed how to reduce modifiable risk factors and encouraged smoking cessation were applicable.. A lengthy discussion took place to review the most common complications including but not limited to: deep vein thrombosis, pulmonary embolus, heart attack, stroke, infection, wound breakdown, numbness, damage to nerves, tendon, muscles, arteries or other blood vessels, death and other possible complications from anesthesia. The patient was told that we will take steps to minimize these risks by using sterile technique, antibiotics and DVT prophylaxis when appropriate and follow the patient postoperatively in the office setting to monitor progress. The possibility of recurrent pain, no improvement in pain and actual worsening of pain were also discussed with the patient.\par The discharge plan of care focused on the patient going home following surgery. The patient was encouraged to make the necessary arrangements to have someone stay with them when they are discharged home. Following discharge, a home care nurse will visit the patient. The home care nurse will open your home care case and request home physical therapy services. Home physical therapy will commence following discharge provided it is appropriate and covered by the health insurance benefit plan. \par The benefits of surgery were discussed with the patient including the potential for improving his current clinical condition through operative intervention. Alternatives to surgical intervention including continued conservative management were also discussed in detail. All questions were answered to the satisfaction of the patient. The treatment plan of care, as well as a model of a total knee arthroplasty equivalent to the one that will be used for their total joint replacement, was shared with the patient. The patient agreed to the plan of care as well as the use of implants in their total joint replacement.

## 2020-09-14 NOTE — ED PROCEDURE NOTE - ATTENDING CONTRIBUTION TO CARE
Problem: Mobility Impaired (Adult and Pediatric)  Goal: *Acute Goals and Plan of Care (Insert Text)  Note:   1. Mr. Kiana Justin will perform supine to sit with bed flat and no rails independently in 7 days. 2.  Mr. Kiana Justin will perform sit to stand and bed to chair independently in 7 days. 3.  Mr. Kiana Justin will perform gait >1000 ft independently in 7 days. 4.  Mr. Kiana Justin will perform therex  x 25 reps in sitting and standing in 7 days. PHYSICAL THERAPY: Daily Note and AM 9/14/2020  INPATIENT: PT Visit Days : 3  [place] / [place] / Product Type: Medicare /       NAME/AGE/GENDER: Austen Jimenez is a 73 y.o. male   PRIMARY DIAGNOSIS: Aortic valve stenosis, etiology of cardiac valve disease unspecified [I35.0]  Bicuspid cardiac valve [Q23.1]  Aortic stenosis [I35.0] S/P AVR (aortic valve replacement) S/P AVR (aortic valve replacement)  Procedure(s) (LRB):  AORTIC VALVE REPLACEMENT (AVR) (N/A)  ESOPHAGEAL TRANS ECHOCARDIOGRAM (N/A)  4 Days Post-Op  ICD-10: Treatment Diagnosis:    · Generalized Muscle Weakness (M62.81)  · Difficulty in walking, Not elsewhere classified (R26.2)   Precaution/Allergies:  Buspirone; Metoprolol; and Zoloft [sertraline]      ASSESSMENT:     Mr. Kiana Justin presents following above. At baseline he lives by himself. Mr. Kiana Justin has a history of anxiety and that is evident to some extent today. \    Pt sitting up in chair on contact and agreeable to work with therapy. Pt stood and ambulated 250' with SBA. He aslso ascended and descended 5 steps with SBA. Pt returned to chair in room. Pt left up in chair with needs in reach. Pt is making good progress towards goals. Mr. Kiana Justin is functioning below baseline and is therefore appropriate for skilled PT to maximize his rehab potential.      This section established at most recent assessment   PROBLEM LIST (Impairments causing functional limitations):  1. Decreased Strength  2.  Decreased Transfer Abilities  3. Decreased Ambulation Ability/Technique  4. Decreased Activity Tolerance   INTERVENTIONS PLANNED: (Benefits and precautions of physical therapy have been discussed with the patient.)  1. Bed Mobility  2. Gait Training  3. Therapeutic Activites  4. Therapeutic Exercise/Strengthening  5. Transfer Training     TREATMENT PLAN: Frequency/Duration: twice daily for duration of hospital stay  Rehabilitation Potential For Stated Goals: Good     REHAB RECOMMENDATIONS (at time of discharge pending progress):    Placement: It is my opinion, based on this patient's performance to date, that Mr. Andrez Sanchez may benefit from rehab vs home with home health  Equipment:    None at this time              HISTORY:   History of Present Injury/Illness (Reason for Referral):  above  Past Medical History/Comorbidities:   Mr. Andrez Sanchez  has a past medical history of Anxiety, Cataracts, bilateral, Elevated PSA, High grade prostatic intraepithelial neoplasia, Hyperlipidemia, Hypertension, Kidney stone, Left arm pain, and Prostate cancer ([facility] [address] (2015). Mr. Andrez Sanchez  has a past surgical history that includes hx prostatectomy (10/8/15); hx other surgical (2018); hx colonoscopy; hx cataract removal (2015); and hx heent (2001). Social History/Living Environment:   Home Environment: Private residence  # Steps to Enter: 0  One/Two Story Residence: One story  Living Alone: Yes  Support Systems: Friends \ neighbors  Patient Expects to be Discharged to[de-identified] Rehabilitation facility  Current DME Used/Available at Home: None  Tub or Shower Type: Shower  Prior Level of Function/Work/Activity:  Independent, retired, drives.    Age,    Number of Personal Factors/Comorbidities that affect the Plan of Care: 1-2: MODERATE COMPLEXITY   EXAMINATION:   Most Recent Physical Functioning:   Gross Assessment:                  Posture:     Balance:  Sitting: Intact  Standing: Impaired  Standing - Static: Good(-)  Standing - Dynamic : Good(-) Bed Mobility:     Wheelchair Mobility:     Transfers:  Sit to Stand: Supervision  Stand to Sit: Supervision  Gait:     Speed/Clarisa: Pace decreased (<100 feet/min)  Step Length: Left shortened;Right shortened  Distance (ft): 250 Feet (ft)  Assistive Device: (none)  Ambulation - Level of Assistance: Supervision;Stand-by assistance  Number of Stairs Trained: 5  Stairs - Level of Assistance: Stand-by assistance  Rail Use: Both      Body Structures Involved:  1. Muscles Body Functions Affected:  1. Movement Related Activities and Participation Affected:  1. Mobility   Number of elements that affect the Plan of Care: 3: MODERATE COMPLEXITY   CLINICAL PRESENTATION:   Presentation: Evolving clinical presentation with changing clinical characteristics: MODERATE COMPLEXITY   CLINICAL DECISION MAKIN [facility] Inpatient Short Form  How much difficulty does the patient currently have. .. Unable A Lot A Little None   1. Turning over in bed (including adjusting bedclothes, sheets and blankets)? [] 1   [] 2   [x] 3   [] 4   2. Sitting down on and standing up from a chair with arms ( e.g., wheelchair, bedside commode, etc.)   [] 1   [] 2   [x] 3   [] 4   3. Moving from lying on back to sitting on the side of the bed? [] 1   [] 2   [x] 3   [] 4   How much help from another person does the patient currently need. .. Total A Lot A Little None   4. Moving to and from a bed to a chair (including a wheelchair)? [] 1   [] 2   [x] 3   [] 4   5. Need to walk in hospital room? [] 1   [] 2   [x] 3   [] 4   6. Climbing 3-5 steps with a railing? [] 1   [] 2   [x] 3   [] 4   © , Trustees of [address] [place] [id number], under license to [employer]. All rights reserved      Score:  Initial: 18 Most Recent: X (Date: -- )    Interpretation of Tool:  Represents activities that are increasingly more difficult (i.e. Bed mobility, Transfers, Gait).     Medical Necessity:     · Patient is expected to demonstrate progress in · functional technique  ·  to   · increase independence with mobility and gait. · .  Reason for Services/Other Comments:  · Patient   · continues to require present interventions due to patient's inability to function at baseline. · .   Use of outcome tool(s) and clinical judgement create a POC that gives a: Questionable prediction of patient's progress: MODERATE COMPLEXITY            TREATMENT:   (In addition to Assessment/Re-Assessment sessions the following treatments were rendered)   Pre-treatment Symptoms/Complaints:  No complaints. Pain: Initial:      Post Session:        Therapeutic Activity: (   12 minutes): Therapeutic activities including Chair transfers, steps and Ambulation on level ground to improve mobility. Required minimal   to promote motor control of bilateral, upper extremity(s), lower extremity(s). Date:  9/11 Date:  9/12/20 Date:     Activity/Exercise Parameters AM and PM Parameters   AP 10 X 15 B    LAQ 10 X 15 B    Marching 10 X 15 B    Abd/add 10 X 15 B    Glut sets. 10                     Braces/Orthotics/Lines/Etc:   · O2 Device: Room air  Treatment/Session Assessment:    · Response to Treatment:  good   · Interdisciplinary Collaboration:   o Registered Nurse  · After treatment position/precautions:   o Up in chair  o Bed/Chair-wheels locked  o Call light within reach  o RN notified   · Compliance with Program/Exercises: Will assess as treatment progresses  · Recommendations/Intent for next treatment session: \"Next visit will focus on advancements to more challenging activities and reduction in assistance provided\".   Total Treatment Duration:  PT Patient Time In/Time Out  Time In: 1018  Time Out: [address] [facility] skin tear to buttock  agree with repair

## 2020-09-14 NOTE — HISTORY OF PRESENT ILLNESS
[Pain Location] : pain [Worsening] : worsening [4] : a current pain level of 4/10 [___ mths] : [unfilled] month(s) ago [Rest] : relieved by rest [Walking] : walking [de-identified] : 76 y/o male presents to office for evaluation of right knee pain that has been persistent for the last 4 months. She had a cortisone injection in June 2020 which provided minimal relief. The pain is mainly in the medial aspect of the knee. Pt has pain when he twists. He states that he hears his right knee clicks. Pt had left TKA in 2015 with good outcome. Pt reports more pain with standing. He states that he had right knee scope many years ago. Pt reports that he has spine issues and left sided weakness. He takes tramadol, and is being followed by pain management Dr. Stevens. [Incontinence] : no incontinence [Ataxia] : no ataxia [Urinary Ret.] : no urinary retention [Loss of Dexterity] : good dexterity [de-identified] : stairs, twisting

## 2020-10-05 NOTE — ADDENDUM
[FreeTextEntry1] : Documented by Jus De Jesus acting as a scribe for Dr. Jacky Fuller on 06/09/2020 . All medical record entries made by the Scribe were at my, Dr. Jacky Fuller, direction and personally dictated by me on 06/09/2020 . I have reviewed the chart and agree that the record accurately reflects my personal performance of the history, physical exam, assessment and plan. I have also personally directed, reviewed, and agreed with the chart.
94

## 2020-10-12 NOTE — H&P PST ADULT - LAST STRESS TEST
10/12/2020  12:29 PM     Nutrition Education    · Verbally reviewed information with Patient  · Educated on Low K+ Diet  · Written educational materials provided via Discharge Instructions  · Contact name and number provided. Consult for Low K+ Diet teaching. Discharge plan for later today back to [facility]. Diet will be provided and moderated at the facility after discharge, but pt wanted information for when she leaves [place] for home. Verbally reviewed basics and  put written Low K+ diet in Discharge instructions. Also provided RD contact information and ONS recommendations to promote wound healing.       Electronically signed by Brendan Araujo, VALENTINA, CNSC, LD on 10/12/20 at 12:29 PM EDT    Contact: [phone] 2016 2017

## 2020-12-11 ENCOUNTER — APPOINTMENT (OUTPATIENT)
Dept: ORTHOPEDIC SURGERY | Facility: CLINIC | Age: 75
End: 2020-12-11
Payer: MEDICARE

## 2020-12-11 VITALS — TEMPERATURE: 95.5 F

## 2020-12-11 VITALS
WEIGHT: 230 LBS | SYSTOLIC BLOOD PRESSURE: 151 MMHG | HEIGHT: 72 IN | HEART RATE: 67 BPM | BODY MASS INDEX: 31.15 KG/M2 | DIASTOLIC BLOOD PRESSURE: 73 MMHG

## 2020-12-11 PROCEDURE — 99215 OFFICE O/P EST HI 40 MIN: CPT

## 2020-12-11 PROCEDURE — 72040 X-RAY EXAM NECK SPINE 2-3 VW: CPT

## 2020-12-11 NOTE — HISTORY OF PRESENT ILLNESS
[de-identified] : 75 year old M Presents S/p ACDF and posterior cervical fusion Patient's is stable. He has no acute changes no acute neurologic changes. His daily question are stable his caregiver as well as he states he is still ambulating with a rolling walker [Ataxia] : no ataxia [Incontinence] : no incontinence [Loss of Dexterity] : good dexterity [Urinary Ret.] : no urinary retention

## 2020-12-11 NOTE — ADDENDUM
[FreeTextEntry1] : Documented by Manohar Black acting as a scribe for Dr. Jacky Fuller on 12/11/2020. All medical record entries made by the Scribe were at my, Dr. Jacky Fuller, direction and personally dictated by me on 12/11/2020 . I have reviewed the chart and agree that the record accurately reflects my personal performance of the history, physical exam, assessment and plan. I have also personally directed, reviewed, and agreed with the chart.

## 2020-12-11 NOTE — PHYSICAL EXAM
[Obese] : obese [Poor Appearance] : well-appearing [Acute Distress] : not in acute distress [de-identified] : CONSTITUTIONAL: Patient is a very pleasant individual who is well-nourished and appears stated age. In a seated position, accompanied by a caregiver\par PSYCHIATRIC: Alert and oriented times three and in no apparent distress, and participates with orthopedic evaluation well.\par HEAD: Atraumatic and nonsyndromic in appearance.\par EENT: No thyromegaly, EOMI.\par RESPIRATORY: Respiratory rate is regular, not dyspneic on examination.\par LYMPHATICS: There is no cervical or axillary lymphadenopathy.\par INTEGUMENTARY: Skin is clean, dry, and intact about the bilateral upper extremities and cervical spine. Anterior posterior incisions are well-healed\par VASCULAR: There is brisk capillary refill about the bilateral upper extremities and radial pulses are 2/4. \par NEUROLOGIC: Negative L'hirmitte, negative Spurling’s sign. There are no pathologic reflexes. There is no decrease in sensation of the bilateral upper extremities on Wartenberg pinwheel examination. Deep tendon reflexes are well-maintained at +2/4 of the bilateral upper extremities and are symmetric. Lower extremities do have diminished sensation are believe consistent with neuropathy\par MUSCULOSKELETAL: There is no visible muscular atrophy. Manual motor strength is well maintained in the bilateral upper extremities. Cervical range of motion is well maintained. The patient ambulates in a non-myelopathic manner. Normal secondary orthopaedic exam of bilateral shoulders, elbows and hands. Elbow flexion and extension, wrist extension, finger flexion and abduction are well maintained. Patient's primaryphysical exam complaint is focal right knee pain that is been shown to have end-stage right knee degenerative joint disease [de-identified] : Xray of a cervical spine taken 12/11/2020  demonstrates a stable cervical fusion.

## 2020-12-11 NOTE — DISCUSSION/SUMMARY
[de-identified] : Patient seen and examined on today's date. As such he is stable he has no cervical complaints. He is with his caregiver he states he stable he's ambulating with a walker would not recommend any additional changes under surgical procedures at this point in time patient will followup in 6 months and her one-year. At a very pleasant conversation of the stoma with regard to his family and friends he wishes to see his brother in [place] as well as his best friend who currently resides in [place] I believe this is a reasonable question is no travelers tractions on orthopedic aspect

## 2021-01-01 NOTE — PATIENT PROFILE ADULT. - ALCOHOL USE HISTORY SINGLE SELECT
Pt transferred from PICU ~ 11:50. Tmax 99.8 axillary, -190s, other VSS. EVITA score 3; irritable between cares. Continuous NJ feeds running. Frequent productive cough present and oral sxn done x2. No family present at bedside. Hourly rounding completed.    never

## 2021-01-11 ENCOUNTER — APPOINTMENT (OUTPATIENT)
Dept: ORTHOPEDIC SURGERY | Facility: CLINIC | Age: 76
End: 2021-01-11

## 2021-02-06 ENCOUNTER — APPOINTMENT (OUTPATIENT)
Dept: DISASTER EMERGENCY | Facility: CLINIC | Age: 76
End: 2021-02-06

## 2021-02-06 DIAGNOSIS — Z01.818 ENCOUNTER FOR OTHER PREPROCEDURAL EXAMINATION: ICD-10-CM

## 2021-02-06 LAB — SARS-COV-2 N GENE NPH QL NAA+PROBE: NOT DETECTED

## 2021-02-08 VITALS
WEIGHT: 207.01 LBS | DIASTOLIC BLOOD PRESSURE: 60 MMHG | TEMPERATURE: 97 F | HEIGHT: 72 IN | RESPIRATION RATE: 18 BRPM | SYSTOLIC BLOOD PRESSURE: 136 MMHG | OXYGEN SATURATION: 98 % | HEART RATE: 70 BPM

## 2021-02-08 NOTE — H&P PST ADULT - HISTORY OF PRESENT ILLNESS
This is a 74 y/o male with h/o HTN, HPL, DM, KEVYN, OA, obesity, NSVT (EPS negative), CAD s/p CABG x 3 and multiple stents s/p recent NST that revealed new lateral ischemia.  He presents today for [facility] for  further evaluation with Dr. Faust.      ASA  Mallampati   GFR  BRA  COVID Negative      Symptoms:        Angina (Class):        Ischemic Symptoms:     Heart Failure:        Systolic/Diastolic/Combined:        NYHA Class (within 2 weeks):     Assessment of LVEF (Must be within 6 months):       EF: 73%       Assessed by: TTE       Date: 12/24/2020    Prior Cardiac Interventions ([facility], stents, CABG): CABG x 3 and multiple stents       PCI's (Date, Stents, Vessels): PETERSON x 1 to pLAD ([place] 3.0c22hi--2/2009); PETERSON x 1 to dLCX ([place] 2.5x23--5/2009); stent x 1 to dLAD (distal to anastamosis); 8/2015 LCX stent patent but native LCX stent 100% distal to anastamosis;        cath 10/2016 patent grafts and small nondominant RCA 85% stenosis       CABG (Date, Grafts): LIMA to LAD; SVG to D1; SVG to LPDA    Noninvasive Testing:   Stress Test: Date: 1/25/2021       Protocol: Regadenoson        Symptoms: None       EKG Changes: none       Myocardial Imaging: Moderate zone of lateral ischemia       Risk Assessment (Low, Medium, High): medium    Echo (Date, Findings): 12/24/2020: EF 73%; moderate concentric hypertrophy; normal LVSF; diastolic dysfunction; trace TR; trace AR     Antianginal Therapies:        Beta Blockers: Atenolol       Calcium Channel Blockers:        Long Acting Nitrates: NTG transdermal daily        Ranexa:     Associated Risk Factors:        Cerebrovascular Disease: N/A       Chronic Lung Disease: N/A       Peripheral Arterial Disease: N/A       Chronic Kidney Disease (if yes, what is GFR): N/A       Uncontrolled Diabetes (if yes, what is HgbA1C or FBS): N/A       Poorly Controlled Hypertension (if yes, what is SBP): N/A       Morbid Obesity (if yes, what is BMI): N/A       History of Recent Ventricular Arrhythmia: N/A       Inability to Ambulate Safely: N/A       Need for Therapeutic Anticoagulation: N/A       Antiplatelet or Contrast Allergy: N/A This is a 74 y/o male with h/o HTN, HPL, DM, KEVYN, OA, obesity, NSVT (EPS negative), CAD s/p CABG x 3 (2016) and multiple stents (2009) now s/p recent NST that revealed new lateral ischemia.  He presents today for [facility] for  further evaluation with Dr. Faust.      ASA 4  Mallampati II  GFR  BRA  COVID Negative      Symptoms: None        Heart Failure: No         Assessment of LVEF (Must be within 6 months):       EF: 73%       Assessed by: TTE       Date: 12/24/2020    Prior Cardiac Interventions ([facility], stents, CABG): CABG x 3 and multiple stents       PCI's (Date, Stents, Vessels): PETERSON x 1 to pLAD ([place] 3.4y91nd--1/2009); PETERSON x 1 to dLCX ([place] 2.5x23--5/2009); stent x 1 to dLAD (distal to anastamosis); 8/2015 LCX stent patent but native LCX stent 100% distal to anastamosis;        cath 10/2016 patent grafts and small nondominant RCA 85% stenosis       CABG (Date, Grafts): LIMA to LAD; SVG to D1; SVG to LPDA    Noninvasive Testing:   Stress Test: Date: 1/25/2021       Protocol: Regadenoson        Symptoms: None       EKG Changes: none       Myocardial Imaging: Moderate zone of lateral ischemia       Risk Assessment (Low, Medium, High): medium    Echo (Date, Findings): 12/24/2020: EF 73%; moderate concentric hypertrophy; normal LVSF; diastolic dysfunction; trace TR; trace ND     Antianginal Therapies:        Beta Blockers: Atenolol       Calcium Channel Blockers:        Long Acting Nitrates: NTG transdermal daily        Ranexa:     Associated Risk Factors:        Cerebrovascular Disease: N/A       Chronic Lung Disease: N/A       Peripheral Arterial Disease: N/A       Chronic Kidney Disease (if yes, what is GFR): N/A       Uncontrolled Diabetes (if yes, what is HgbA1C or FBS): N/A       Poorly Controlled Hypertension (if yes, what is SBP): N/A       Morbid Obesity (if yes, what is BMI): N/A       History of Recent Ventricular Arrhythmia: N/A       Inability to Ambulate Safely: N/A       Need for Therapeutic Anticoagulation: N/A       Antiplatelet or Contrast Allergy: N/A This is a 76 y/o male with h/o HTN, HPL, DM, KEVYN, OA, obesity, NSVT (EPS negative), CAD s/p CABG x 3 (2016) and multiple stents (2009) now s/p recent NST that revealed new lateral ischemia.  He presents today for [facility] for  further evaluation with Dr. Faust.      ASA 4  Mallampati II  GFR 69  Creat 1.06  BRA 0.8%  COVID Negative      Symptoms: None        Heart Failure: No         Assessment of LVEF (Must be within 6 months):       EF: 73%       Assessed by: TTE       Date: 12/24/2020    Prior Cardiac Interventions ([facility], stents, CABG): CABG x 3 and multiple stents       PCI's (Date, Stents, Vessels): PETERSON x 1 to pLAD ([place] 3.8m91nz--6/2009); PETERSON x 1 to dLCX ([place] 2.5x23--5/2009); stent x 1 to dLAD (distal to anastamosis); 8/2015 LCX stent patent but native LCX stent 100% distal to anastamosis;        cath 10/2016 patent grafts and small nondominant RCA 85% stenosis       CABG (Date, Grafts): LIMA to LAD; SVG to D1; SVG to LPDA    Noninvasive Testing:   Stress Test: Date: 1/25/2021       Protocol: Regadenoson        Symptoms: None       EKG Changes: none       Myocardial Imaging: Moderate zone of lateral ischemia       Risk Assessment (Low, Medium, High): medium    Echo (Date, Findings): 12/24/2020: EF 73%; moderate concentric hypertrophy; normal LVSF; diastolic dysfunction; trace TR; trace MT     Antianginal Therapies:        Beta Blockers: Atenolol       Calcium Channel Blockers:        Long Acting Nitrates: NTG transdermal daily        Ranexa:     Associated Risk Factors:        Cerebrovascular Disease: N/A       Chronic Lung Disease: N/A       Peripheral Arterial Disease: N/A       Chronic Kidney Disease (if yes, what is GFR): N/A       Uncontrolled Diabetes (if yes, what is HgbA1C or FBS): N/A       Poorly Controlled Hypertension (if yes, what is SBP): N/A       Morbid Obesity (if yes, what is BMI): N/A       History of Recent Ventricular Arrhythmia: N/A       Inability to Ambulate Safely: N/A       Need for Therapeutic Anticoagulation: N/A       Antiplatelet or Contrast Allergy: N/A

## 2021-02-08 NOTE — H&P PST ADULT - NSICDXPASTSURGICALHX_GEN_ALL_CORE_FT
PAST SURGICAL HISTORY:  Carpal tunnel syndrome B/L  S/P surgery    Encounter for screening colonoscopy     H/O umbilical hernia repair     History of appendectomy     History of knee replacement, total, left     History of tonsillectomy appendectomy    S/P CABG x 3     S/P laminectomy lumbar    Status post arthroscopic knee surgery left knee x2    Status post cardiac surgery triple by-pass surgery, cardiac stents x3

## 2021-02-08 NOTE — H&P PST ADULT - NEGATIVE NEUROLOGICAL SYMPTOMS
no transient paralysis/no weakness/no paresthesias/no generalized seizures/no focal seizures/no tremors

## 2021-02-08 NOTE — H&P PST ADULT - NSICDXFAMILYHX_GEN_ALL_CORE_FT
FAMILY HISTORY:  Family history of CHF (congestive heart failure)  Family history of dementia  Family history of early CAD  Family history of prostate cancer    Sibling  Still living? Yes, Estimated age: Age Unknown  Family history of diabetes mellitus, Age at diagnosis: Age Unknown

## 2021-02-08 NOTE — H&P PST ADULT - ASSESSMENT
76 y/o male with h/o CAD s/p CABG x 3 and multiple stents with abnormal NST revealing lateral ischemia for LHC +/- PCI with Dr. Faust 74 y/o male with h/o CAD s/p CABG x 3 and multiple stents with abnormal NST revealing lateral ischemia for LHC +/- PCI with Dr. Faust     Plan: PRE-PROCEDURE ASSESSMENT    -Patient seen and examined  -Labs reviewed  -Pre-procedure teaching completed with patient   -Questions answered about patients concerns     - Pt instructed to have escort to and from procedure   -pt instructed IF STENT PLACED WILL REQUIRE TO STAY OVERNIGHT FOR MONITORING AND DISCHARGED IN THE AM .

## 2021-02-08 NOTE — H&P PST ADULT - NSICDXPASTMEDICALHX_GEN_ALL_CORE_FT
PAST MEDICAL HISTORY:  Anemia     Back pain s/p lumbar lami    CAD (coronary artery disease) with stent placement    Carpal tunnel syndrome, bilateral s/p surgery    Diabetes     DJD (degenerative joint disease)     High cholesterol     Hypertension     Obesity     Reactive airway disease     Sleep apnea Intolerant of CPAP    Umbilical hernia     Ventricular tachycardia      PAST MEDICAL HISTORY:  Anemia     Back pain s/p lumbar lami    CAD (coronary artery disease) with stent placement    Carpal tunnel syndrome, bilateral s/p surgery    Diabetes     DJD (degenerative joint disease)     H/O ventricular tachycardia     High cholesterol     Hypertension     Macular degeneration     Obesity     Reactive airway disease     Sleep apnea Intolerant of CPAP    Umbilical hernia     Ventricular tachycardia

## 2021-02-09 ENCOUNTER — TRANSCRIPTION ENCOUNTER (OUTPATIENT)
Age: 76
End: 2021-02-09

## 2021-02-09 ENCOUNTER — OUTPATIENT (OUTPATIENT)
Dept: OUTPATIENT SERVICES | Facility: HOSPITAL | Age: 76
LOS: 1 days | Discharge: HOME | End: 2021-02-09
Payer: MEDICARE

## 2021-02-09 VITALS
HEART RATE: 57 BPM | SYSTOLIC BLOOD PRESSURE: 155 MMHG | OXYGEN SATURATION: 96 % | DIASTOLIC BLOOD PRESSURE: 67 MMHG | RESPIRATION RATE: 18 BRPM

## 2021-02-09 DIAGNOSIS — Z95.1 PRESENCE OF AORTOCORONARY BYPASS GRAFT: Chronic | ICD-10-CM

## 2021-02-09 DIAGNOSIS — Z98.89 OTHER SPECIFIED POSTPROCEDURAL STATES: Chronic | ICD-10-CM

## 2021-02-09 DIAGNOSIS — G56.00 CARPAL TUNNEL SYNDROME, UNSPECIFIED UPPER LIMB: Chronic | ICD-10-CM

## 2021-02-09 DIAGNOSIS — R07.9 CHEST PAIN, UNSPECIFIED: ICD-10-CM

## 2021-02-09 DIAGNOSIS — Z96.652 PRESENCE OF LEFT ARTIFICIAL KNEE JOINT: Chronic | ICD-10-CM

## 2021-02-09 DIAGNOSIS — Z12.11 ENCOUNTER FOR SCREENING FOR MALIGNANT NEOPLASM OF COLON: Chronic | ICD-10-CM

## 2021-02-09 LAB
ANION GAP SERPL CALC-SCNC: 16 MMOL/L — SIGNIFICANT CHANGE UP (ref 5–17)
BASOPHILS # BLD AUTO: 0.05 K/UL — SIGNIFICANT CHANGE UP (ref 0–0.2)
BASOPHILS NFR BLD AUTO: 0.4 % — SIGNIFICANT CHANGE UP (ref 0–2)
BUN SERPL-MCNC: 28 MG/DL — HIGH (ref 8–20)
CALCIUM SERPL-MCNC: 9 MG/DL — SIGNIFICANT CHANGE UP (ref 8.6–10.2)
CHLORIDE SERPL-SCNC: 101 MMOL/L — SIGNIFICANT CHANGE UP (ref 98–107)
CO2 SERPL-SCNC: 24 MMOL/L — SIGNIFICANT CHANGE UP (ref 22–29)
CREAT SERPL-MCNC: 1.06 MG/DL — SIGNIFICANT CHANGE UP (ref 0.5–1.3)
EOSINOPHIL # BLD AUTO: 0.11 K/UL — SIGNIFICANT CHANGE UP (ref 0–0.5)
EOSINOPHIL NFR BLD AUTO: 0.8 % — SIGNIFICANT CHANGE UP (ref 0–6)
GLUCOSE SERPL-MCNC: 121 MG/DL — HIGH (ref 70–99)
HCT VFR BLD CALC: 41.7 % — SIGNIFICANT CHANGE UP (ref 39–50)
HGB BLD-MCNC: 13.5 G/DL — SIGNIFICANT CHANGE UP (ref 13–17)
IMM GRANULOCYTES NFR BLD AUTO: 0.5 % — SIGNIFICANT CHANGE UP (ref 0–1.5)
LYMPHOCYTES # BLD AUTO: 1.74 K/UL — SIGNIFICANT CHANGE UP (ref 1–3.3)
LYMPHOCYTES # BLD AUTO: 13.3 % — SIGNIFICANT CHANGE UP (ref 13–44)
MCHC RBC-ENTMCNC: 30.1 PG — SIGNIFICANT CHANGE UP (ref 27–34)
MCHC RBC-ENTMCNC: 32.4 GM/DL — SIGNIFICANT CHANGE UP (ref 32–36)
MCV RBC AUTO: 93.1 FL — SIGNIFICANT CHANGE UP (ref 80–100)
MONOCYTES # BLD AUTO: 0.71 K/UL — SIGNIFICANT CHANGE UP (ref 0–0.9)
MONOCYTES NFR BLD AUTO: 5.4 % — SIGNIFICANT CHANGE UP (ref 2–14)
NEUTROPHILS # BLD AUTO: 10.46 K/UL — HIGH (ref 1.8–7.4)
NEUTROPHILS NFR BLD AUTO: 79.6 % — HIGH (ref 43–77)
PLATELET # BLD AUTO: 253 K/UL — SIGNIFICANT CHANGE UP (ref 150–400)
POTASSIUM SERPL-MCNC: 4.6 MMOL/L — SIGNIFICANT CHANGE UP (ref 3.5–5.3)
POTASSIUM SERPL-SCNC: 4.6 MMOL/L — SIGNIFICANT CHANGE UP (ref 3.5–5.3)
RBC # BLD: 4.48 M/UL — SIGNIFICANT CHANGE UP (ref 4.2–5.8)
RBC # FLD: 13.3 % — SIGNIFICANT CHANGE UP (ref 10.3–14.5)
SODIUM SERPL-SCNC: 141 MMOL/L — SIGNIFICANT CHANGE UP (ref 135–145)
WBC # BLD: 13.13 K/UL — HIGH (ref 3.8–10.5)
WBC # FLD AUTO: 13.13 K/UL — HIGH (ref 3.8–10.5)

## 2021-02-09 PROCEDURE — 85730 THROMBOPLASTIN TIME PARTIAL: CPT

## 2021-02-09 PROCEDURE — 99153 MOD SED SAME PHYS/QHP EA: CPT

## 2021-02-09 PROCEDURE — C1769: CPT

## 2021-02-09 PROCEDURE — 80048 BASIC METABOLIC PNL TOTAL CA: CPT

## 2021-02-09 PROCEDURE — 93459 L HRT ART/GRFT ANGIO: CPT

## 2021-02-09 PROCEDURE — 36415 COLL VENOUS BLD VENIPUNCTURE: CPT

## 2021-02-09 PROCEDURE — 85025 COMPLETE CBC W/AUTO DIFF WBC: CPT

## 2021-02-09 PROCEDURE — 85610 PROTHROMBIN TIME: CPT

## 2021-02-09 PROCEDURE — C1760: CPT

## 2021-02-09 PROCEDURE — 99152 MOD SED SAME PHYS/QHP 5/>YRS: CPT

## 2021-02-09 PROCEDURE — C1887: CPT

## 2021-02-09 PROCEDURE — C1894: CPT

## 2021-02-09 PROCEDURE — 93005 ELECTROCARDIOGRAM TRACING: CPT

## 2021-02-09 PROCEDURE — 93010 ELECTROCARDIOGRAM REPORT: CPT

## 2021-02-09 RX ORDER — ATORVASTATIN CALCIUM 80 MG/1
1 TABLET, FILM COATED ORAL
Qty: 0 | Refills: 0 | DISCHARGE

## 2021-02-09 RX ORDER — NITROGLYCERIN 6.5 MG
0 CAPSULE, EXTENDED RELEASE ORAL
Qty: 0 | Refills: 0 | DISCHARGE

## 2021-02-09 RX ORDER — METFORMIN HYDROCHLORIDE 850 MG/1
1 TABLET ORAL
Qty: 0 | Refills: 0 | DISCHARGE

## 2021-02-09 RX ORDER — TRAMADOL HYDROCHLORIDE 50 MG/1
0 TABLET ORAL
Qty: 0 | Refills: 0 | DISCHARGE

## 2021-02-09 RX ORDER — FERROUS SULFATE 325(65) MG
65 TABLET ORAL
Qty: 0 | Refills: 0 | DISCHARGE

## 2021-02-09 RX ORDER — ASPIRIN/CALCIUM CARB/MAGNESIUM 324 MG
81 TABLET ORAL DAILY
Refills: 0 | Status: DISCONTINUED | OUTPATIENT
Start: 2021-02-09 | End: 2021-02-24

## 2021-02-09 RX ORDER — NITROGLYCERIN 6.5 MG
1 CAPSULE, EXTENDED RELEASE ORAL
Qty: 0 | Refills: 0 | DISCHARGE

## 2021-02-09 RX ADMIN — Medication 81 MILLIGRAM(S): at 15:51

## 2021-02-09 NOTE — DISCHARGE NOTE PROVIDER - NSDCCPCAREPLAN_GEN_ALL_CORE_FT
PRINCIPAL DISCHARGE DIAGNOSIS  Diagnosis: CAD (coronary artery disease)  Assessment and Plan of Treatment:

## 2021-02-09 NOTE — DISCHARGE NOTE PROVIDER - NSDCMRMEDTOKEN_GEN_ALL_CORE_FT
acetaminophen 325 mg oral tablet: 3 tab(s) orally every 6 hours, As needed, Mild Pain (1 - 3) or temp 100.3f or above  ALPRAZolam 0.25 mg oral tablet: 1 tab(s) orally 2 times a day, As Needed  Asmanex HFA: 220 microgram(s) inhaled 2 times a day  aspirin 81 mg oral tablet, chewable: 1 tab(s) orally once a day START upon discharge  atenolol 50 mg oral tablet: 1 tab(s) orally once a day  atorvastatin 40 mg oral tablet: 1 tab(s) orally once a day  bumetanide 1 mg oral tablet: 1 tab(s) orally once a day  ferrous sulfate: 65 milligram(s) orally once a day  gabapentin 100 mg oral tablet: 3 tab(s) orally once a day (at bedtime)  glipiZIDE 10 mg oral tablet: 1 tab(s) orally once a day  magnesium oxide 400 mg (241.3 mg elemental magnesium) oral tablet: 1 tab(s) orally once a day  metFORMIN 500 mg oral tablet, extended release: 1 tab(s) orally 2 times a day    RESTART on Thursday pm  nitroglycerin:   nitroglycerin 0.3 mg/hr transdermal film, extended release: 1 patch transdermal once a day  omeprazole 40 mg oral delayed release capsule: 1 cap(s) orally once a day  potassium chloride 10 mEq oral tablet, extended release: 1 tab(s) orally once a day  traMADol 50 mg oral tablet:

## 2021-02-09 NOTE — DISCHARGE NOTE NURSING/CASE MANAGEMENT/SOCIAL WORK - PATIENT PORTAL LINK FT
You can access the FollowMyHealth Patient Portal offered by [facility] by registering at the following website: [email or web address]. By joining [facility]’s FollowMyHealth portal, you will also be able to view your health information using other applications (apps) compatible with our system.

## 2021-02-09 NOTE — CONSULT NOTE ADULT - SUBJECTIVE AND OBJECTIVE BOX
Patient is a 75y old  Male who presents with a chief complaint of CAD and abnormal nuclear stress test      HPI:  This is a 74 y/o male with h/o HTN, hyperlipidemia, DM, KEVYN, OA, obesity, NSVT (EPS negative), CAD s/p CABG x 3 (2016) and multiple stents (2009) now s/p recent NST that revealed new lateral ischemia. He has occasional dyspnea on exertion.        Noninvasive Testing:   Stress Test: Date: 1/25/2021       Protocol: Regadenoson        Symptoms: None       EKG Changes: none       Myocardial Imaging: Moderate zone of lateral ischemia       Risk Assessment (Low, Medium, High): medium    Echo (Date, Findings): 12/24/2020: EF 73%; moderate concentric hypertrophy; normal LVSF; diastolic dysfunction; trace TR; trace NC       PAST MEDICAL & SURGICAL HISTORY:  H/O ventricular tachycardia    Macular degeneration    Obesity    Hypertension    Anemia    Ventricular tachycardia    Reactive airway disease    Back pain  s/p lumbar lami    Sleep apnea  Intolerant of CPAP    Carpal tunnel syndrome, bilateral  s/p surgery    DJD (degenerative joint disease)    Umbilical hernia    High cholesterol    CAD (coronary artery disease)  with stent placement    Diabetes    History of knee replacement, total, left    S/P CABG x 3    H/O umbilical hernia repair    Carpal tunnel syndrome  B/L  S/P surgery    Status post cardiac surgery  triple by-pass surgery, cardiac stents x3    S/P laminectomy  lumbar    Status post arthroscopic knee surgery  left knee x2    History of appendectomy    History of tonsillectomy  appendectomy    Encounter for screening colonoscopy      Allergies    penicillin (Other)    Intolerances        MEDICATIONS  (STANDING):  aspirin  chewable 81 milliGRAM(s) Oral daily  Atenolol 50 mg QD  Bumex 0.5 mg QD  KCL 10 meq BID  Asmanex  Gabapnetin  Metformin  QD  Lipitor 40 Qhs  Glipizide ER 10 mg QD  Omeprazole 40 QD    FAMILY HISTORY:  Family history of diabetes mellitus (Sibling)    Family history of prostate cancer    Family history of dementia    Family history of CHF (congestive heart failure)    Family history of early CAD        SOCIAL HISTORY:    CIGARETTES:  Former    ALCOHOL:  Rare    REVIEW OF SYSTEMS:  CONSTITUTIONAL: No fever, weight loss, or fatigue  EYES: No eye pain, visual disturbances, or discharge  ENMT:  No difficulty hearing, tinnitus, vertigo; No sinus or throat pain  NECK: No pain or stiffness  RESPIRATORY: No cough, wheezing, chills or hemoptysis  CARDIOVASCULAR: No chest pain, palpitations, passing out, dizziness, or leg swelling  GASTROINTESTINAL: No abdominal or epigastric pain. No nausea, vomiting, or hematemesis; No diarrhea or constipation. No melena or hematochezia.  GENITOURINARY: No dysuria, frequency, hematuria, or incontinence  NEUROLOGICAL: No headaches, memory loss, loss of strength, numbness, or tremors  SKIN: No itching, burning, rashes, or lesions   LYMPH Nodes: No enlarged glands  ENDOCRINE: No heat or cold intolerance; No hair loss  MUSCULOSKELETAL: No joint pain or swelling; No muscle, back, or extremity pain  PSYCHIATRIC: No depression, anxiety, mood swings, or difficulty sleeping  HEME/LYMPH: No easy bruising, or bleeding gums  ALLERY AND IMMUNOLOGIC: No hives or eczema	    Vital Signs Last 24 Hrs  T(C): 36.3 (09 Feb 2021 13:58), Max: 36.3 (09 Feb 2021 13:58)  T(F): 97.3 (09 Feb 2021 13:58), Max: 97.3 (09 Feb 2021 13:58)  HR: 57 (09 Feb 2021 18:00) (54 - 61)  BP: 155/67 (09 Feb 2021 18:00) (99/50 - 155/67)  BP(mean): --  RR: 18 (09 Feb 2021 18:00) (17 - 18)  SpO2: 96% (09 Feb 2021 18:00) (96% - 100%)    Daily Height in cm: 180.34 (09 Feb 2021 13:58)    Daily     I&O's Detail      PHYSICAL EXAM:  Appearance: Normal, well nourished	  HEENT:   Normal oral mucosa, PERRL, EOMI, sclera non-icteric	  Lymphatic: No cervical lymphadenopathy  Cardiovascular: Normal S1 S2, No JVD, No cardiac murmurs, No carotid bruits, No peripheral edema  Respiratory: Lungs clear to auscultation	  Psychiatry: A & O x 3, Mood & affect appropriate  Gastrointestinal:  Soft, Non-tender, + BS, no bruits	  Skin: No rashes, No ecchymoses, No cyanosis  Neurologic: Grossly non-focal with full strength in all four extremities  Extremities: Normal range of motion, No clubbing, cyanosis or edema  Vascular: Peripheral pulses palpable 2+ bilaterally    LABS:                        13.5   13.13 )-----------( 253      ( 09 Feb 2021 14:15 )             41.7     02-09    141  |  101  |  28.0<H>  ----------------------------<  121<H>  4.6   |  24.0  |  1.06    Ca    9.0      09 Feb 2021 14:15              I&O's Summary    BNP  RADIOLOGY & ADDITIONAL STUDIES:    Assessment:  This is a 74 y/o male with h/o HTN, hyperlipidemia, DM, KEVYN, OA, obesity, NSVT (EPS negative), CAD s/p CABG x 3 (2016) and multiple stents (2009) now s/p recent NST that revealed new lateral ischemia. He has occasional dyspnea on exertion.      Plan:  Cardiac catheterization and possible percutaneous intervention recommended.  Risks, benefits, and alternatives reviewed.  Risks including but not limited to MI, death, stroke, bleeding, infection, vessel injury, hematoma, renal failure, allergic reaction, urgent open heart surgery, restenosis and stent thrombosis were reviewed.  All questions answered.  Patient is agreeable to proceed.

## 2021-02-09 NOTE — DISCHARGE NOTE PROVIDER - CARE PROVIDER_API CALL
Hilda Posada)  Cardiovascular Disease  [address] [place] [address]  Phone: [phone]  Fax: [phone]  Follow Up Time: 1 month

## 2021-02-09 NOTE — DISCHARGE NOTE PROVIDER - HOSPITAL COURSE
HPI:  This is a 76 y/o male with h/o HTN, HPL, DM, KEVYN, OA, obesity, NSVT (EPS negative), CAD s/p CABG x 3 (2016) and multiple stents (2009) now s/p recent NST that revealed new lateral ischemia.  He presents today for [facility] for  further evaluation with Dr. Faust.    now s/p [facility]        ASSESSMENT/PLAN:    Coronary artery disease - Abnormal NST  -No new coronary artery disease  -Recovered patient for 3 hours  -Resumed cardiac diet  -Discharge to home with escort   -Resume all home medications  -Plan of care discussed with patient, family and MD  -Follow-up with attending cardiologist Dr Posada within 1 month  -Discussed therapeutic lifestyle changes to reduce risk factors such as following a cardiac diet, weight loss, maintaining a healthy weight, exercise, smoking cessation, medication compliance, and regular follow-up with MD to know your numbers (BP, cholesterol, weight, and glucose)

## 2021-02-11 DIAGNOSIS — I20.8 OTHER FORMS OF ANGINA PECTORIS: ICD-10-CM

## 2021-05-03 ENCOUNTER — APPOINTMENT (OUTPATIENT)
Dept: ORTHOPEDIC SURGERY | Facility: CLINIC | Age: 76
End: 2021-05-03
Payer: MEDICARE

## 2021-05-03 VITALS
WEIGHT: 220 LBS | HEART RATE: 63 BPM | TEMPERATURE: 98.1 F | SYSTOLIC BLOOD PRESSURE: 101 MMHG | DIASTOLIC BLOOD PRESSURE: 57 MMHG | BODY MASS INDEX: 29.8 KG/M2 | HEIGHT: 72 IN

## 2021-05-03 PROBLEM — I10 ESSENTIAL (PRIMARY) HYPERTENSION: Chronic | Status: ACTIVE | Noted: 2021-02-08

## 2021-05-03 PROBLEM — E66.9 OBESITY, UNSPECIFIED: Chronic | Status: ACTIVE | Noted: 2021-02-08

## 2021-05-03 PROBLEM — Z86.79 PERSONAL HISTORY OF OTHER DISEASES OF THE CIRCULATORY SYSTEM: Chronic | Status: ACTIVE | Noted: 2021-02-09

## 2021-05-03 PROBLEM — H35.30 UNSPECIFIED MACULAR DEGENERATION: Chronic | Status: ACTIVE | Noted: 2021-02-09

## 2021-05-03 PROCEDURE — 99214 OFFICE O/P EST MOD 30 MIN: CPT | Mod: 25

## 2021-05-03 PROCEDURE — 20610 DRAIN/INJ JOINT/BURSA W/O US: CPT

## 2021-05-03 NOTE — PHYSICAL EXAM
[ALL] : dorsalis pedis, posterior tibial, femoral, popliteal, and radial 2+ and symmetric bilaterally [Walker] : ambulates with walker [Wheelchair] : uses a wheelchair [LE] : Sensory: Intact in bilateral lower extremities [Normal] : Alert and in no acute distress [Poor Appearance] : well-appearing [de-identified] : GENERAL APPEARANCE:   Well nourished and hydrated, pleasant, alert, and oriented x 4.  \par CARDIOVASCULAR:   No apparent abnormalities.  No lower leg edema.  No varicosities.  Pedal pulses are palpable.\par RESPIRATORY: Breathe sound clear and audible in all lobes. No wheezing, No accessory muscle use.\par NEUROLOGIC:   Sensation is normal, no muscle weakness in the upper or lower extremities.\par DERMATOLOGIC:   No apparent skin lesions, moist, warm, no rash.\par SPINE:   Cervical spine appears normal and moves freely; thoracic spine appears normal and moves freely; lumbosacral spine appears normal and moves freely, normal, nontender.\par MUSCULOSKELETAL:   Hands, wrists, and elbows are normal and move freely, shoulders are normal and move freely. \par  [de-identified] : right knee examination shows knees and severe varus alignment, medial joint line tenderness, his active range of motion is a ° \par left knee midline incision is healed he has no joint effusion no erythema active range of motion is 5-110°.

## 2021-05-03 NOTE — DISCUSSION/SUMMARY
[de-identified] : 75 y/o M with bone on bone medial compartmental osteoarthritis with varus deformity of the right knee and s/p left TKA from 2015. Conservative therapy and surgical options discussed in detail with the patient. He is a candidate for a right TKA, but he is not interested in pursuing surgery at this time because he has tolerable pain. Additionally, he is still experiencing some pain and stiffness in the left knee after the TKA. He would like to continue with conservative therapies for the right knee. Pt opted to receive a cortisone injection in the right knee today and tolerated it well. F/u with us in three months for repeat cortisone injections if needed. \par \par The patient is a 76 year old individual with end stage arthritis of their right knee joint. Based upon the patient's continued symptoms and failure to respond to conservative treatment I have recommended a staged right total knee arthroplasty for this patient. A long discussion took place with the patient describing what a total joint replacement is and what the procedure would entail. A total knee arthroplasty model, similar to the implant that will be used during the operation, was utilized to demonstrate and to discuss the various bearing surfaces of the implants. The hospitalization and post-operative care and rehabilitation were also discussed. The use of perioperative antibiotics and DVT prophylaxis were discussed. The risk, benefits and alternatives to a surgical intervention were discussed at length with the patient. The patient was also advised of risks related to the medical comorbidities, elevated body mass index (BMI), and smoking where applicable. We discussed how to reduce modifiable risk factors and encouraged smoking cessation were applicable.. A lengthy discussion took place to review the most common complications including but not limited to: deep vein thrombosis, pulmonary embolus, heart attack, stroke, infection, wound breakdown, numbness, damage to nerves, tendon, muscles, arteries or other blood vessels, death and other possible complications from anesthesia. The patient was told that we will take steps to minimize these risks by using sterile technique, antibiotics and DVT prophylaxis when appropriate and follow the patient postoperatively in the office setting to monitor progress. The possibility of recurrent pain, no improvement in pain and actual worsening of pain were also discussed with the patient.\par The discharge plan of care focused on the patient going home following surgery. The patient was encouraged to make the necessary arrangements to have someone stay with them when they are discharged home. Following discharge, a home care nurse will visit the patient. The home care nurse will open your home care case and request home physical therapy services. Home physical therapy will commence following discharge provided it is appropriate and covered by the health insurance benefit plan. \par The benefits of surgery were discussed with the patient including the potential for improving his current clinical condition through operative intervention. Alternatives to surgical intervention including continued conservative management were also discussed in detail. All questions were answered to the satisfaction of the patient. The treatment plan of care, as well as a model of a total knee arthroplasty equivalent to the one that will be used for their total joint replacement, was shared with the patient. The patient agreed to the plan of care as well as the use of implants in their total joint replacement. \par \par The patient is a 76 year individual with end stage arthritis of their left knee joint. Based upon the patient's continued symptoms and failure to respond to conservative treatment, pt successfully completed left total knee arthroplasty. A long discussion took place with the patient describing what a total joint replacement is and what the procedure would entail. A total knee arthroplasty model, similar to the implant that was used during the operation, was utilized to demonstrate and to discuss the various bearing surfaces of the implants. The hospitalization and post-operative care and rehabilitation were also discussed. The use of perioperative antibiotics and DVT prophylaxis were discussed. The risk, benefits and alternatives to a surgical intervention were discussed at length with the patient. The patient was also advised of risks related to the medical comorbidities, elevated body mass index (BMI), and smoking where applicable. We discussed how to reduce modifiable risk factors and encouraged smoking cessation were applicable.. A lengthy discussion took place to review the most common complications including but not limited to: deep vein thrombosis, pulmonary embolus, heart attack, stroke, infection, wound breakdown, numbness, damage to nerves, tendon, muscles, arteries or other blood vessels, death and other possible complications from anesthesia. The patient was told that we will take steps to minimize these risks by using sterile technique, antibiotics and DVT prophylaxis when appropriate and follow the patient postoperatively in the office setting to monitor progress. The possibility of recurrent pain, no improvement in pain and actual worsening of pain were also discussed with the patient.\par The discharge plan of care focused on the patient going home following surgery. The patient was encouraged to make the necessary arrangements to have someone stay with them when they are discharged home. Following discharge, a home care nurse was to the patient. The home care nurse would open the patient’s home care case and request home physical therapy services. Home physical therapy was to commence following discharge provided it was appropriate and covered by the health insurance benefit plan. \par The benefits of surgery were discussed with the patient including the potential for improving his current clinical condition through operative intervention. Alternatives to surgical intervention including continued conservative management were also discussed in detail. All questions were answered to the satisfaction of the patient. The treatment plan of care, as well as a model of a total knee arthroplasty equivalent to the one that will be used for their total joint replacement, was shared with the patient. The patient agreed to the plan of care as well as the use of implants in their total joint replacement.

## 2021-05-03 NOTE — HISTORY OF PRESENT ILLNESS
[Pain Location] : pain [Worsening] : worsening [Walking] : worsened by walking [Rest] : relieved by rest [3] : a current pain level of 3/10 [1] : a minimum pain level of 1/10 [6] : a maximum pain level of 6/10 [de-identified] : 75 y/o male presents to office for evaluation of right knee pain. He has been receiving cortisone injections with minimal relief. His last cortisone injection was in September 2020. The pain is mainly in the medial aspect of the knee. Pt has pain when he twists. He states that he hears his right knee clicks. He states that he had right knee scope many years ago. Pt had left TKA in 2015 with good outcome. Pt reports that he has spine issues and left sided weakness. He takes tramadol, and is being followed by pain management Dr. Stevens. [Ataxia] : no ataxia [Incontinence] : no incontinence [Loss of Dexterity] : good dexterity [Urinary Ret.] : no urinary retention [de-identified] : stairs, twisting

## 2021-05-03 NOTE — END OF VISIT
[FreeTextEntry3] : I, Geovani Jacobson, acted solely as a scribe for Dr. Arie Randall on this date 05/03/2021.

## 2021-05-03 NOTE — REVIEW OF SYSTEMS
[Joint Pain] : joint pain [Negative] : Heme/Lymph [Joint Stiffness] : joint stiffness [FreeTextEntry9] : right knee

## 2021-05-03 NOTE — PROCEDURE
[de-identified] : patient received a right knee cortisone injection\par \par I discussed at length with the patient the planned steroid and lidocaine injection for primary osteoarthritis. The risks, benefits, convalescence and alternatives were reviewed and pt consented for injection. The possible side effects discussed included but were not limited to: pain, swelling, heat, bleeding, and redness. Symptoms are generally mild but if they are extensive then contact the office. Giving pain relievers by mouth such as NSAIDs or Tylenol can generally treat the reactions to steroid and lidocaine. Rare cases of infection have been noted. Rash, hives and itching may occur post injection. If you have muscle pain or cramps, flushing and or swelling of the face, rapid heart beat, nausea, dizziness, fever, chills, headache, difficulty breathing, swelling in the arms or legs, or have a prickly feeling of your skin, contact a health care provider immediately. Following this discussion, the knee was prepped with Alcohol and under sterile condition the 80 mg Depo-Medrol and 6 cc Lidocaine injection was performed with a 20 gauge needle through a superolateral injection site. The needle was introduced into the joint, aspiration was performed to ensure intra-articular placement and the medication was injected. Upon withdrawal of the needle the site was cleaned with alcohol and a band aid applied. The patient tolerated the injection well and there were no adverse effects. Post injection instructions included no strenuous activity for 24 hours, cryotherapy and if there are any adverse effects to contact the office.\par

## 2021-05-03 NOTE — REASON FOR VISIT
[Follow-Up Visit] : a follow-up visit for [Knee Pain] : knee pain [Family Member] : family member [FreeTextEntry2] : Right knee pain

## 2021-06-08 ENCOUNTER — EMERGENCY (EMERGENCY)
Facility: HOSPITAL | Age: 76
LOS: 1 days | Discharge: HOME | End: 2021-06-08
Attending: EMERGENCY MEDICINE
Payer: MEDICARE

## 2021-06-08 VITALS
WEIGHT: 220.02 LBS | HEIGHT: 71 IN | TEMPERATURE: 98 F | DIASTOLIC BLOOD PRESSURE: 74 MMHG | HEART RATE: 85 BPM | SYSTOLIC BLOOD PRESSURE: 137 MMHG | RESPIRATION RATE: 20 BRPM | OXYGEN SATURATION: 98 %

## 2021-06-08 VITALS
OXYGEN SATURATION: 96 % | RESPIRATION RATE: 18 BRPM | SYSTOLIC BLOOD PRESSURE: 120 MMHG | TEMPERATURE: 98 F | HEART RATE: 72 BPM | DIASTOLIC BLOOD PRESSURE: 70 MMHG

## 2021-06-08 DIAGNOSIS — G56.00 CARPAL TUNNEL SYNDROME, UNSPECIFIED UPPER LIMB: Chronic | ICD-10-CM

## 2021-06-08 DIAGNOSIS — Z98.89 OTHER SPECIFIED POSTPROCEDURAL STATES: Chronic | ICD-10-CM

## 2021-06-08 DIAGNOSIS — Z12.11 ENCOUNTER FOR SCREENING FOR MALIGNANT NEOPLASM OF COLON: Chronic | ICD-10-CM

## 2021-06-08 DIAGNOSIS — Z96.652 PRESENCE OF LEFT ARTIFICIAL KNEE JOINT: Chronic | ICD-10-CM

## 2021-06-08 DIAGNOSIS — Z95.1 PRESENCE OF AORTOCORONARY BYPASS GRAFT: Chronic | ICD-10-CM

## 2021-06-08 PROCEDURE — 73630 X-RAY EXAM OF FOOT: CPT | Mod: 26,LT

## 2021-06-08 PROCEDURE — 73630 X-RAY EXAM OF FOOT: CPT

## 2021-06-08 PROCEDURE — 99283 EMERGENCY DEPT VISIT LOW MDM: CPT

## 2021-06-08 PROCEDURE — 99283 EMERGENCY DEPT VISIT LOW MDM: CPT | Mod: 25

## 2021-06-08 RX ORDER — GABAPENTIN 400 MG/1
300 CAPSULE ORAL ONCE
Refills: 0 | Status: COMPLETED | OUTPATIENT
Start: 2021-06-08 | End: 2021-06-08

## 2021-06-08 RX ORDER — IBUPROFEN 200 MG
400 TABLET ORAL ONCE
Refills: 0 | Status: COMPLETED | OUTPATIENT
Start: 2021-06-08 | End: 2021-06-08

## 2021-06-08 RX ADMIN — GABAPENTIN 300 MILLIGRAM(S): 400 CAPSULE ORAL at 10:17

## 2021-06-08 RX ADMIN — Medication 400 MILLIGRAM(S): at 10:17

## 2021-06-08 NOTE — ED ADULT TRIAGE NOTE - CHIEF COMPLAINT QUOTE
Patient BIBA to ED from home with c/o left foot pain, no injury, uses walker to get around at home and has 24/7 home aide.  Patient hx DM.

## 2021-06-08 NOTE — ED PROVIDER NOTE - CLINICAL SUMMARY MEDICAL DECISION MAKING FREE TEXT BOX
Patient is a 76y male with h/o DM2, HTN, HLD, KEVYN, OA, CAD s/p CABG multiple stents. BIBA to the ED with acute left foot pain, localizing in left 3rd metatarsal. Atraumatic on exam. Will obtain Xray, Gabapentin for neuropathy and pain control. Patient is a 76y male with h/o DM2, HTN, HLD, KEVYN, OA, CAD s/p CABG multiple stents. BIBA to the ED with acute left foot pain, localizing in left 3rd metatarsal. Atraumatic on exam, no clinical evidence of ischemia. Will obtain Xray, Gabapentin for neuropathy and pain control.

## 2021-06-08 NOTE — ED PROVIDER NOTE - PHYSICAL EXAMINATION
Constitutional: Awake, alert, in no acute distress  Eyes: no scleral icterus  HENT: normocephalic, atraumatic, moist oral mucosa, bandage to bridge of nose   Neck: supple  CV: RRR, no murmur, 2+ distal pulses in all extremities, no peripheral edema   Pulm: non-labored respirations, CTAB  Abdomen: soft, non-tender, non-distended  Extremities: no edema, no deformity, +motor + strength, diminished sensation as baseline, tender to palpation of 3rd metatarsal   Skin: no rash, no streaking, no cellulitis   Neuro: AAOx3, moving all extremities equally

## 2021-06-08 NOTE — ED PROVIDER NOTE - ATTENDING CONTRIBUTION TO CARE
I, Bogdan Herbert, performed a face to face bedside interview with this patient regarding history of present illness, review of symptoms and relevant past medical, social and family history.  I completed an independent physical examination. I have communicated the patient’s plan of care and disposition with the resident.  76 year old male with PMH CAD presents with L 3rd toe pain x 1 day. NO trauma, No pain at rst but pain with walking only. no numbness, tingling  Gen: NAD, well appearing  CV: RRR, palpable DP and PT pulse, pulses strong with doppler for the PT, and DP all the way down to the web space between the 1st and 2nd toe  Pul: CTA b/l  Abd: Soft, non-distended, non-tender  Neuro: no focal deficits  msk: midfoot and all toes are non-tender, no erythema/warmth/induration, cap refill 2-3 s  No clinical sign of ischemia, infection, or traumatic injury, stable for dc I, Bogdan Herbert, performed a face to face bedside interview with this patient regarding history of present illness, review of symptoms and relevant past medical, social and family history.  I completed an independent physical examination. I have communicated the patient’s plan of care and disposition with the resident and student.  76 year old male with PMH CAD presents with L 3rd toe pain x 1 day. NO trauma, No pain at rst but pain with walking only. no numbness, tingling  Gen: NAD, well appearing  CV: RRR, palpable DP and PT pulse, pulses strong with doppler for the PT, and DP all the way down to the web space between the 1st and 2nd toe  Pul: CTA b/l  Abd: Soft, non-distended, non-tender  Neuro: no focal deficits  msk: midfoot and all toes are non-tender, no erythema/warmth/induration, cap refill 2-3 s  No clinical sign of ischemia, infection, or traumatic injury, stable for dc

## 2021-06-08 NOTE — ED PROVIDER NOTE - OBJECTIVE STATEMENT
Patient is a 76y male with h/o DM2, HTN, HLD, KEVYN, OA, CAD s/p CABG multiple stents. BIBA to the ED with acute left foot pain that began last night. Patient states when went to put his shoe on this morning, pain returned and he was unable to bear weight. Described the pain as intermittent 6/10 and sharp. Endorses baseline numbness to feet from neuropathy. Denies trauma to area, swelling, erythema, pruritis, ulcers, cellulitis.

## 2021-06-08 NOTE — ED PROVIDER NOTE - PMH
Anemia    Back pain  s/p lumbar lami  CAD (coronary artery disease)  with stent placement  Carpal tunnel syndrome, bilateral  s/p surgery  Diabetes    DJD (degenerative joint disease)    H/O ventricular tachycardia    High cholesterol    Hypertension    Macular degeneration    Obesity    Reactive airway disease    Sleep apnea  Intolerant of CPAP  Umbilical hernia    Ventricular tachycardia

## 2021-06-08 NOTE — ED PROVIDER NOTE - NSFOLLOWUPINSTRUCTIONS_ED_ALL_ED_FT
- Follow up with your podiatrist.  - Make take tylenol/ibuprofen as needed for pain.  - May increase your gabapentin dose to 300 mg twice a day as needed.  - Return to the emergency room for worsening pain, coldness in foot, increased numbness/tingling, redness, swelling    ------------------------    Foot Pain    Many things can cause foot pain. Some common causes are:  •An injury.    •A sprain.    •Arthritis.    •Blisters.    •Bunions.    Follow these instructions at home:    Managing pain, stiffness, and swelling   If directed, put ice on the painful area:  •Put ice in a plastic bag.    •Place a towel between your skin and the bag.    •Leave the ice on for 20 minutes, 2–3 times a day.    Activity     • Do not stand or walk for long periods.    •Return to your normal activities as told by your health care provider. Ask your health care provider what activities are safe for you.    •Do stretches to relieve foot pain and stiffness as told by your health care provider.    • Do not lift anything that is heavier than 10 lb (4.5 kg), or the limit that you are told, until your health care provider says that it is safe. Lifting a lot of weight can put added pressure on your feet.    Lifestyle     •Wear comfortable, supportive shoes that fit you well. Do not wear high heels.    •Keep your feet clean and dry.    General instructions     •Take over-the-counter and prescription medicines only as told by your health care provider.    •Rub your foot gently.    •Pay attention to any changes in your symptoms.    •Keep all follow-up visits as told by your health care provider. This is important.      Contact a health care provider if:    •Your pain does not get better after a few days of self-care.    •Your pain gets worse.    •You cannot stand on your foot.      Get help right away if:    •Your foot is numb or tingling.    •Your foot or toes are swollen.    •Your foot or toes turn white or blue.    •You have warmth and redness along your foot.      Summary    •Common causes of foot pain are injury, sprain, arthritis, blisters or bunions.    •Ice, medicines, and comfortable shoes may help foot pain.    •Contact your health care provider if your pain does not get better after a few days of self-care.    This information is not intended to replace advice given to you by your health care provider. Make sure you discuss any questions you have with your health care provider.

## 2021-06-08 NOTE — ED PROVIDER NOTE - NS ED ROS FT
Constitutional: no fever, no chills  Eyes: no vision changes  ENT: no nasal congestion, no sore throat  CV: no chest pain  Resp: no cough, no shortness of breath  GI: no abdominal pain, no vomiting, no diarrhea  MSK: left foot pain, 3rd phalange  Skin: no rash, no erythema  Neuro: no headache, no weakness, no paresthesias, no dizziness

## 2021-06-08 NOTE — ED ADULT NURSE NOTE - NSIMPLEMENTINTERV_GEN_ALL_ED
Implemented All Fall Risk Interventions:  [place] to call system. Call bell, personal items and telephone within reach. Instruct patient to call for assistance. Room bathroom lighting operational. Non-slip footwear when patient is off stretcher. Physically safe environment: no spills, clutter or unnecessary equipment. Stretcher in lowest position, wheels locked, appropriate side rails in place. Provide visual cue, wrist band, yellow gown, etc. Monitor gait and stability. Monitor for mental status changes and reorient to person, place, and time. Review medications for side effects contributing to fall risk. Reinforce activity limits and safety measures with patient and family.

## 2021-06-08 NOTE — ED PROVIDER NOTE - PATIENT PORTAL LINK FT
You can access the FollowMyHealth Patient Portal offered by [facility] by registering at the following website: [email or web address]. By joining [employer]’s FollowMyHealth portal, you will also be able to view your health information using other applications (apps) compatible with our system.

## 2021-06-08 NOTE — ED PROVIDER NOTE - CARE PROVIDER_API CALL
Rk Bowens (DPM)  Podiatric Medicine and Surgery  [facility] [place] [address]  Phone: [phone]  Fax: [phone]  Follow Up Time:

## 2021-06-08 NOTE — ED PROVIDER NOTE - PROGRESS NOTE DETAILS
Seferino: X-rays reviewed.  Pt able to bear weight with assistance but needs walker to ambulate.  Will arrange for ambulance.

## 2021-07-09 ENCOUNTER — APPOINTMENT (OUTPATIENT)
Dept: ORTHOPEDIC SURGERY | Facility: CLINIC | Age: 76
End: 2021-07-09
Payer: MEDICARE

## 2021-07-09 VITALS
HEIGHT: 72 IN | WEIGHT: 220 LBS | DIASTOLIC BLOOD PRESSURE: 57 MMHG | HEART RATE: 59 BPM | BODY MASS INDEX: 29.8 KG/M2 | SYSTOLIC BLOOD PRESSURE: 107 MMHG

## 2021-07-09 DIAGNOSIS — Z86.79 PERSONAL HISTORY OF OTHER DISEASES OF THE CIRCULATORY SYSTEM: ICD-10-CM

## 2021-07-09 DIAGNOSIS — I25.10 ATHEROSCLEROTIC HEART DISEASE OF NATIVE CORONARY ARTERY W/OUT ANGINA PECTORIS: ICD-10-CM

## 2021-07-09 DIAGNOSIS — G95.9 DISEASE OF SPINAL CORD, UNSPECIFIED: ICD-10-CM

## 2021-07-09 DIAGNOSIS — Z86.39 PERSONAL HISTORY OF OTHER ENDOCRINE, NUTRITIONAL AND METABOLIC DISEASE: ICD-10-CM

## 2021-07-09 DIAGNOSIS — Z86.69 PERSONAL HISTORY OF OTHER DISEASES OF THE NERVOUS SYSTEM AND SENSE ORGANS: ICD-10-CM

## 2021-07-09 PROCEDURE — 72040 X-RAY EXAM NECK SPINE 2-3 VW: CPT

## 2021-07-09 PROCEDURE — 99214 OFFICE O/P EST MOD 30 MIN: CPT

## 2021-07-09 NOTE — DISCUSSION/SUMMARY
[de-identified] : Patient seen and examined on today's date. As such he is stable he has no cervical complaints. He is with his caregiver he states he is stable and is seated in a wheelchair. I would not recommend any additional changes under surgical procedures at this point in time patient will followup in 6 months to one-year. \par \par Had a very pleasant conversation with regard to his family and friends he wishes to see his brother in [place] as well as his best friend who currently resides in [place] I believe this is a reasonable question and \par Prior to appointment and during encounter with patient extensive medical records were reviewed including but not limited to, hospital records, out patient records, imaging results, and lab data. During this appointment the patient was examined, diagnoses were discussed and explained in a face to face manner. In addition extensive time was spent reviewing aforementioned diagnostic studies. Counseling including abnormal image results, differential diagnoses, treatment options, risk and benefits, lifestyle changes, current condition, and current medications was performed. Patient's comments, questions, and concerns were address and patient verbalized understanding. Based on this patient's presentation at our office, which is an orthopedic spine surgeon's office, this patient inherently / intrinsically has a risk, however minute, of developing  issues such as Cauda equina syndrome, bowel and bladder changes, or progression of motor or neurological deficits such as paralysis which may be permanent. \par \par Patient with multiple medical comorbidity increasing the risk of perioperative and postoperative complications as well as diminished spine outcomes as per the current medical literature.  These include but are not limited to obesity, anxiety/depression, cardiac illness, kidney disease, peripheral vascular disease, history of cancer, COPD, dysmetabolic syndrome including but not limited to diabetes, hyperlipidemia, hypertension.  Patient is being referred back to primary care provider for medical optimization, as well as other appropriate specialists as needed for optimization prior to spine surgery.

## 2021-07-09 NOTE — PHYSICAL EXAM
[Obese] : obese [Poor Appearance] : well-appearing [Acute Distress] : not in acute distress [de-identified] : CONSTITUTIONAL: Patient is a very pleasant individual who is well-nourished and appears stated age. In a seated position, accompanied by a caregiver\par PSYCHIATRIC: Alert and oriented times three and in no apparent distress, and participates with orthopedic evaluation well.\par HEAD: Atraumatic and nonsyndromic in appearance.\par EENT: No thyromegaly, EOMI.\par RESPIRATORY: Respiratory rate is regular, not dyspneic on examination.\par LYMPHATICS: There is no cervical or axillary lymphadenopathy.\par INTEGUMENTARY: Skin is clean, dry, and intact about the bilateral upper extremities and cervical spine. Anterior posterior incisions are well-healed\par VASCULAR: There is brisk capillary refill about the bilateral upper extremities and radial pulses are 2/4. \par NEUROLOGIC: Negative L'hirmitte, negative Spurling’s sign. There are no pathologic reflexes. There is no decrease in sensation of the bilateral upper extremities on Wartenberg pinwheel examination. Deep tendon reflexes are well-maintained at +2/4 of the bilateral upper extremities and are symmetric. Lower extremities do have diminished sensation are believe consistent with neuropathy\par MUSCULOSKELETAL: There is no visible muscular atrophy. Manual motor strength is well maintained in the bilateral upper extremities. Cervical range of motion is well maintained. The patient ambulates in a non-myelopathic manner. Normal secondary orthopaedic exam of bilateral shoulders, elbows and hands. Elbow flexion and extension, wrist extension, finger flexion and abduction are well maintained. Patient's primaryphysical exam complaint is focal right knee pain that is been shown to have end-stage right knee degenerative joint disease [de-identified] : Xray of a cervical spine taken 12/11/2020  demonstrates a stable cervical fusion. \par \par Cervical x-ray taken today demonstrates stable orientation and positioning of anterior and posterior instrumentation when compared to previous x-rays.\par \par Today's x-ray as compared to an x-ray from June of 2020 that shows again stable alignment and positioning. There was also for reference to his cervical x-ray was obtained in December of 2019

## 2021-07-09 NOTE — HISTORY OF PRESENT ILLNESS
[de-identified] : 75 year old M Presents S/p ACDF and posterior cervical fusion Patient's is stable. He has no acute changes no acute neurologic changes. ELINA questionnaire stable. [Ataxia] : no ataxia [Incontinence] : no incontinence [Loss of Dexterity] : good dexterity [Urinary Ret.] : no urinary retention

## 2021-07-09 NOTE — REVIEW OF SYSTEMS
[Joint Pain] : joint pain [Negative] : Heme/Lymph [Chills] : no chills [Cough] : no cough [SOB on Exertion] : no shortness of breath on exertion

## 2021-09-13 ENCOUNTER — APPOINTMENT (OUTPATIENT)
Dept: ORTHOPEDIC SURGERY | Facility: CLINIC | Age: 76
End: 2021-09-13
Payer: MEDICARE

## 2021-09-13 VITALS
SYSTOLIC BLOOD PRESSURE: 96 MMHG | HEART RATE: 64 BPM | TEMPERATURE: 98.1 F | HEIGHT: 72 IN | WEIGHT: 220 LBS | BODY MASS INDEX: 29.8 KG/M2 | DIASTOLIC BLOOD PRESSURE: 51 MMHG

## 2021-09-13 PROCEDURE — 99213 OFFICE O/P EST LOW 20 MIN: CPT | Mod: 25

## 2021-09-13 PROCEDURE — 20610 DRAIN/INJ JOINT/BURSA W/O US: CPT

## 2021-09-13 NOTE — END OF VISIT
[FreeTextEntry3] : I, Geovani Jacobson, acted solely as a scribe for Dr. Arie Randall on this date 09/13/2021.

## 2021-09-13 NOTE — DISCUSSION/SUMMARY
[Medication Risks Reviewed] : Medication risks reviewed [de-identified] : 77 y/o M with bone on bone medial compartmental osteoarthritis with varus deformity of the right knee. Conservative therapy and surgical options discussed in detail with the patient. Pt is a candidate for a right TKA; however, he is not interested in pursuing surgery at this time. He has been receiving cortisone injections which have been providing him with some relief. Today he opted for a repeat right knee cortisone injection which he tolerated well. F/u with us in three months for repeat cortisone injections if needed. \par \par The patient is a 76 year old individual with end stage arthritis of their right knee joint. Based upon the patient's continued symptoms and failure to respond to conservative treatment I have recommended a staged right total knee arthroplasty for this patient. A long discussion took place with the patient describing what a total joint replacement is and what the procedure would entail. A total knee arthroplasty model, similar to the implant that will be used during the operation, was utilized to demonstrate and to discuss the various bearing surfaces of the implants. The hospitalization and post-operative care and rehabilitation were also discussed. The use of perioperative antibiotics and DVT prophylaxis were discussed. The risk, benefits and alternatives to a surgical intervention were discussed at length with the patient. The patient was also advised of risks related to the medical comorbidities, elevated body mass index (BMI), and smoking where applicable. We discussed how to reduce modifiable risk factors and encouraged smoking cessation were applicable.. A lengthy discussion took place to review the most common complications including but not limited to: deep vein thrombosis, pulmonary embolus, heart attack, stroke, infection, wound breakdown, numbness, damage to nerves, tendon, muscles, arteries or other blood vessels, death and other possible complications from anesthesia. The patient was told that we will take steps to minimize these risks by using sterile technique, antibiotics and DVT prophylaxis when appropriate and follow the patient postoperatively in the office setting to monitor progress. The possibility of recurrent pain, no improvement in pain and actual worsening of pain were also discussed with the patient.\par The discharge plan of care focused on the patient going home following surgery. The patient was encouraged to make the necessary arrangements to have someone stay with them when they are discharged home. Following discharge, a home care nurse will visit the patient. The home care nurse will open your home care case and request home physical therapy services. Home physical therapy will commence following discharge provided it is appropriate and covered by the health insurance benefit plan. \par The benefits of surgery were discussed with the patient including the potential for improving his current clinical condition through operative intervention. Alternatives to surgical intervention including continued conservative management were also discussed in detail. All questions were answered to the satisfaction of the patient. The treatment plan of care, as well as a model of a total knee arthroplasty equivalent to the one that will be used for their total joint replacement, was shared with the patient. The patient agreed to the plan of care as well as the use of implants in their total joint replacement. \par \par The patient is a 76 year individual with end stage arthritis of their left knee joint. Based upon the patient's continued symptoms and failure to respond to conservative treatment, pt successfully completed left total knee arthroplasty. A long discussion took place with the patient describing what a total joint replacement is and what the procedure would entail. A total knee arthroplasty model, similar to the implant that was used during the operation, was utilized to demonstrate and to discuss the various bearing surfaces of the implants. The hospitalization and post-operative care and rehabilitation were also discussed. The use of perioperative antibiotics and DVT prophylaxis were discussed. The risk, benefits and alternatives to a surgical intervention were discussed at length with the patient. The patient was also advised of risks related to the medical comorbidities, elevated body mass index (BMI), and smoking where applicable. We discussed how to reduce modifiable risk factors and encouraged smoking cessation were applicable.. A lengthy discussion took place to review the most common complications including but not limited to: deep vein thrombosis, pulmonary embolus, heart attack, stroke, infection, wound breakdown, numbness, damage to nerves, tendon, muscles, arteries or other blood vessels, death and other possible complications from anesthesia. The patient was told that we will take steps to minimize these risks by using sterile technique, antibiotics and DVT prophylaxis when appropriate and follow the patient postoperatively in the office setting to monitor progress. The possibility of recurrent pain, no improvement in pain and actual worsening of pain were also discussed with the patient.\par The discharge plan of care focused on the patient going home following surgery. The patient was encouraged to make the necessary arrangements to have someone stay with them when they are discharged home. Following discharge, a home care nurse was to the patient. The home care nurse would open the patient’s home care case and request home physical therapy services. Home physical therapy was to commence following discharge provided it was appropriate and covered by the health insurance benefit plan. \par The benefits of surgery were discussed with the patient including the potential for improving his current clinical condition through operative intervention. Alternatives to surgical intervention including continued conservative management were also discussed in detail. All questions were answered to the satisfaction of the patient. The treatment plan of care, as well as a model of a total knee arthroplasty equivalent to the one that will be used for their total joint replacement, was shared with the patient. The patient agreed to the plan of care as well as the use of implants in their total joint replacement.

## 2021-09-13 NOTE — HISTORY OF PRESENT ILLNESS
[Pain Location] : pain [Worsening] : worsening [3] : a current pain level of 3/10 [1] : a minimum pain level of 1/10 [6] : a maximum pain level of 6/10 [Walking] : worsened by walking [Rest] : relieved by rest [de-identified] : 75 y/o M presents with right knee pain. He has a known hx of bone on bone medial compartmental osteoarthritis with varus deformity of the right knee. The majority of his pain in the medial aspect of the knee. He has been receiving cortisone injections in the knee which have been providing him with some relief. His last cortisone injection was on 5/3/2021. The right knee clicks. He had a right knee scope many years ago. He is also s/p left TKA in 2015 with a good outcome. Pt had left TKA in 2015 and still has some pain and stiffness. Pt reports that he has spine issues and left sided weakness. He takes tramadol, and is being followed by pain management Dr. Stevens. [Ataxia] : no ataxia [Incontinence] : no incontinence [Loss of Dexterity] : good dexterity [Urinary Ret.] : no urinary retention [de-identified] : stairs, twisting

## 2021-09-13 NOTE — PHYSICAL EXAM
[Walker] : ambulates with walker [Wheelchair] : uses a wheelchair [LE] : Sensory: Intact in bilateral lower extremities [ALL] : dorsalis pedis, posterior tibial, femoral, popliteal, and radial 2+ and symmetric bilaterally [Normal] : Alert and in no acute distress [Poor Appearance] : well-appearing [de-identified] : GENERAL APPEARANCE:   Well nourished and hydrated, pleasant, alert, and oriented x 4.  \par CARDIOVASCULAR:   No apparent abnormalities.  No lower leg edema.  No varicosities.  Pedal pulses are palpable.\par RESPIRATORY: Breathe sound clear and audible in all lobes. No wheezing, No accessory muscle use.\par NEUROLOGIC:   Sensation is normal, no muscle weakness in the upper or lower extremities.\par DERMATOLOGIC:   No apparent skin lesions, moist, warm, no rash.\par SPINE:   Cervical spine appears normal and moves freely; thoracic spine appears normal and moves freely; lumbosacral spine appears normal and moves freely, normal, nontender.\par MUSCULOSKELETAL:   Hands, wrists, and elbows are normal and move freely, shoulders are normal and move freely. \par  [de-identified] : right knee examination shows knees and severe varus alignment, medial joint line tenderness, his active range of motion is a ° \par left knee midline incision is healed he has no joint effusion no erythema active range of motion is 5-110°.

## 2021-09-13 NOTE — PROCEDURE
[de-identified] : patient received a right knee cortisone injection\par \par I discussed at length with the patient the planned steroid and lidocaine injection for primary osteoarthritis. The risks, benefits, convalescence and alternatives were reviewed and pt consented for injection. The possible side effects discussed included but were not limited to: pain, swelling, heat, bleeding, and redness. Symptoms are generally mild but if they are extensive then contact the office. Giving pain relievers by mouth such as NSAIDs or Tylenol can generally treat the reactions to steroid and lidocaine. Rare cases of infection have been noted. Rash, hives and itching may occur post injection. If you have muscle pain or cramps, flushing and or swelling of the face, rapid heart beat, nausea, dizziness, fever, chills, headache, difficulty breathing, swelling in the arms or legs, or have a prickly feeling of your skin, contact a health care provider immediately. Following this discussion, the knee was prepped with Alcohol and under sterile condition the 80 mg Depo-Medrol and 6 cc Lidocaine injection was performed with a 20 gauge needle through a superolateral injection site. The needle was introduced into the joint, aspiration was performed to ensure intra-articular placement and the medication was injected. Upon withdrawal of the needle the site was cleaned with alcohol and a band aid applied. The patient tolerated the injection well and there were no adverse effects. Post injection instructions included no strenuous activity for 24 hours, cryotherapy and if there are any adverse effects to contact the office.\par

## 2021-10-05 NOTE — DISCHARGE NOTE ADULT - CARE PROVIDER_API CALL
Jacky Fuller (DO), Orthopaedic Surgery  [address] [place] [address]  Phone: [phone]  Fax: [phone] No

## 2021-10-15 ENCOUNTER — EMERGENCY (EMERGENCY)
Facility: HOSPITAL | Age: 76
LOS: 1 days | Discharge: HOME | End: 2021-10-15
Attending: EMERGENCY MEDICINE
Payer: MEDICARE

## 2021-10-15 VITALS
TEMPERATURE: 98 F | SYSTOLIC BLOOD PRESSURE: 133 MMHG | DIASTOLIC BLOOD PRESSURE: 67 MMHG | RESPIRATION RATE: 20 BRPM | HEART RATE: 71 BPM | OXYGEN SATURATION: 98 %

## 2021-10-15 VITALS
HEART RATE: 69 BPM | WEIGHT: 229.94 LBS | RESPIRATION RATE: 18 BRPM | HEIGHT: 71 IN | SYSTOLIC BLOOD PRESSURE: 120 MMHG | TEMPERATURE: 99 F | OXYGEN SATURATION: 95 % | DIASTOLIC BLOOD PRESSURE: 71 MMHG

## 2021-10-15 DIAGNOSIS — Z95.1 PRESENCE OF AORTOCORONARY BYPASS GRAFT: Chronic | ICD-10-CM

## 2021-10-15 DIAGNOSIS — Z12.11 ENCOUNTER FOR SCREENING FOR MALIGNANT NEOPLASM OF COLON: Chronic | ICD-10-CM

## 2021-10-15 DIAGNOSIS — G56.00 CARPAL TUNNEL SYNDROME, UNSPECIFIED UPPER LIMB: Chronic | ICD-10-CM

## 2021-10-15 DIAGNOSIS — Z98.89 OTHER SPECIFIED POSTPROCEDURAL STATES: Chronic | ICD-10-CM

## 2021-10-15 DIAGNOSIS — Z96.652 PRESENCE OF LEFT ARTIFICIAL KNEE JOINT: Chronic | ICD-10-CM

## 2021-10-15 PROCEDURE — 99284 EMERGENCY DEPT VISIT MOD MDM: CPT | Mod: 25

## 2021-10-15 PROCEDURE — 73630 X-RAY EXAM OF FOOT: CPT | Mod: 26,LT

## 2021-10-15 PROCEDURE — 73630 X-RAY EXAM OF FOOT: CPT

## 2021-10-15 PROCEDURE — 99284 EMERGENCY DEPT VISIT MOD MDM: CPT

## 2021-10-15 RX ORDER — ACETAMINOPHEN 500 MG
975 TABLET ORAL ONCE
Refills: 0 | Status: COMPLETED | OUTPATIENT
Start: 2021-10-15 | End: 2021-10-15

## 2021-10-15 RX ADMIN — Medication 975 MILLIGRAM(S): at 15:41

## 2021-10-15 NOTE — ED PROVIDER NOTE - PROGRESS NOTE DETAILS
upon discharge patient expressing concerns going home took ambulance to ED, has flight of stairs at home, PT consult ordered for evaluation, XR shows no FX, cleared to participate with PT WBAT  called ambulance for , has 24 hour aide at home

## 2021-10-15 NOTE — PHYSICAL THERAPY INITIAL EVALUATION ADULT - PERTINENT HX OF CURRENT PROBLEM, REHAB EVAL
76 year old male with multiple co morbidities here for L foot pain x 1 day.  He notes pain is exacerbated by walking.  He describes the pain as sharp and shooting in nature.  He notes did not take any pain medication PTA.  Patient reports no trauma or falls.

## 2021-10-15 NOTE — ED PROVIDER NOTE - PATIENT PORTAL LINK FT
You can access the FollowMyHealth Patient Portal offered by [facility] by registering at the following website: [email or web address]. By joining [employer]’s FollowMyHealth portal, you will also be able to view your health information using other applications (apps) compatible with our system.

## 2021-10-15 NOTE — PHYSICAL THERAPY INITIAL EVALUATION ADULT - LEVEL OF INDEPENDENCE: STAIR NEGOTIATION, REHAB EVAL
deferred stair training. pt reports he is in no pain and will be able to do stairs at home with the help as his aide "like usual"  pt states aide assist him on one side and he uses a cane on the other side.

## 2021-10-15 NOTE — PHYSICAL THERAPY INITIAL EVALUATION ADULT - ADDITIONAL COMMENTS
pt reports he lives in a two story house with 4 steps to enter with no handrails + full flight down to the basement and upstairs. pt states he no longer goes upstairs/downstairs. pt has a 24/7 aide (Anil) for the past two years, who assists with all ADLs and drives pt when needed, pt ambulates small distances with RW or rollator at home, " I don't walk outside very much," pt states he has a w/c and " uses quite a bit." pt owns a RW, rollator, w/c and has grab bars in shower and around the toilet.

## 2021-10-15 NOTE — PHYSICAL THERAPY INITIAL EVALUATION ADULT - LIGHT TOUCH SENSATION, RLE, REHAB EVAL
with eyes closed pt unable to report light touch during assessment from the knee down to feet bilaterally. pt reports sensation when thigh was assessed./severe impairment

## 2021-10-15 NOTE — ED PROVIDER NOTE - ATTENDING CONTRIBUTION TO CARE
Patinet with left foot pain.  As interpreted by undersigned physician, xrays demonstrate no acute pathology. PT evaluated and patient will d/c.  Discussed signs and symptoms and reasons for return with good teachback. will f/u. Uneventful ED observation period. Non toxic.  Well appearing.

## 2021-10-15 NOTE — PHYSICAL THERAPY INITIAL EVALUATION ADULT - ORIENTATION, REHAB EVAL
pt reports " March" when asked for month; when prompted that it is fall pt able to report "October" as the current month. pt reports " 2021" as the year./person/place/situation

## 2021-10-15 NOTE — PHYSICAL THERAPY INITIAL EVALUATION ADULT - GENERAL OBSERVATIONS, REHAB EVAL
pt received semi staley in SUBWR F in ED garcia, A&OX3-4, NAD & willing to participate with PT. pt received semi staley in stretcher in SUBWR F in ED garcia, A&OX3-4, NAD & willing to participate with PT.

## 2021-10-15 NOTE — PHYSICAL THERAPY INITIAL EVALUATION ADULT - GAIT DISTANCE, PT EVAL
x 2; pt denies pain with ambulation. Kyphotic posture noted and step to pattern. pt states this is his usual gait mechanics./10 feet

## 2021-10-15 NOTE — ED PROVIDER NOTE - CARE PROVIDER_API CALL
Matt Mayberry (DPM)  Podiatric Medicine and Surgery  [address] [place] [address]  Phone: [phone]  Fax: [phone]  Follow Up Time:

## 2021-10-15 NOTE — ED PROVIDER NOTE - OBJECTIVE STATEMENT
This is a 76 year old male with multiple co morbidities here for L foot pain x 1 day.  He notes pain is exacerbated by walking.  He describes the pain as sharp and shooting in nature.  He notes did not take any pain medication PTA.  Patient reports no trauma or falls.

## 2021-10-15 NOTE — ED PROVIDER NOTE - PHYSICAL EXAMINATION
Constitutional - well-developed; well nourished. Head - NCAT. Airway patent.  Neuro - A&Ox3. strength 5/5 x4. sensation intact x4. normal gait. Skin - No rash. MSK - +TTP along +TTP along 3rd/4th digit, no skin changes, DP and PT pulses intact, skin warm and dry.  normal ROM.

## 2021-10-15 NOTE — PROVIDER CONTACT NOTE (OTHER) - ASSESSMENT
PT orders received, chart reviewed and contents noted. GUIDO medrano'ed pt to be seen pending x-ray results; x -rays have been reviewed and no fractures were noted. PT eval completed and documented, please see for details. pt received semi staley in stretcher in SUBWR F in ED garcia, A&O3-4 with NAD and willing to participate with PT. pt reports 1/10 left toe pain pre PT and 0/10 post PT. pt amb & performed transfers w/ no adverse events. Pt is at baseline, no further PT services are indicated, will not follow. pt left semi staley in ED stretcher with all lines intact and NAD. GUIDO Lujan aware.  pt education: goals and role of PT, safety, use of call bell, RW, energy conservation, benefits of OOB mobility and transfers.

## 2021-12-11 ENCOUNTER — EMERGENCY (EMERGENCY)
Facility: HOSPITAL | Age: 76
LOS: 1 days | Discharge: HOME | End: 2021-12-11
Attending: EMERGENCY MEDICINE
Payer: MEDICARE

## 2021-12-11 VITALS
TEMPERATURE: 101 F | SYSTOLIC BLOOD PRESSURE: 113 MMHG | HEART RATE: 83 BPM | OXYGEN SATURATION: 94 % | HEIGHT: 71 IN | DIASTOLIC BLOOD PRESSURE: 54 MMHG | RESPIRATION RATE: 18 BRPM

## 2021-12-11 VITALS
OXYGEN SATURATION: 96 % | RESPIRATION RATE: 19 BRPM | TEMPERATURE: 99 F | DIASTOLIC BLOOD PRESSURE: 64 MMHG | SYSTOLIC BLOOD PRESSURE: 109 MMHG | HEART RATE: 70 BPM

## 2021-12-11 DIAGNOSIS — Z95.1 PRESENCE OF AORTOCORONARY BYPASS GRAFT: Chronic | ICD-10-CM

## 2021-12-11 DIAGNOSIS — Z98.89 OTHER SPECIFIED POSTPROCEDURAL STATES: Chronic | ICD-10-CM

## 2021-12-11 DIAGNOSIS — Z96.652 PRESENCE OF LEFT ARTIFICIAL KNEE JOINT: Chronic | ICD-10-CM

## 2021-12-11 DIAGNOSIS — G56.00 CARPAL TUNNEL SYNDROME, UNSPECIFIED UPPER LIMB: Chronic | ICD-10-CM

## 2021-12-11 DIAGNOSIS — Z12.11 ENCOUNTER FOR SCREENING FOR MALIGNANT NEOPLASM OF COLON: Chronic | ICD-10-CM

## 2021-12-11 PROCEDURE — 99283 EMERGENCY DEPT VISIT LOW MDM: CPT | Mod: 25

## 2021-12-11 PROCEDURE — 73502 X-RAY EXAM HIP UNI 2-3 VIEWS: CPT | Mod: 26,RT

## 2021-12-11 PROCEDURE — G0378: CPT

## 2021-12-11 PROCEDURE — 73502 X-RAY EXAM HIP UNI 2-3 VIEWS: CPT

## 2021-12-11 PROCEDURE — 99220: CPT

## 2021-12-11 RX ORDER — ACETAMINOPHEN 500 MG
650 TABLET ORAL ONCE
Refills: 0 | Status: COMPLETED | OUTPATIENT
Start: 2021-12-11 | End: 2021-12-11

## 2021-12-11 RX ADMIN — Medication 650 MILLIGRAM(S): at 00:44

## 2021-12-11 NOTE — ED ADULT TRIAGE NOTE - CHIEF COMPLAINT QUOTE
patient states that he was walking when he tripped and fell landing on his right hip, patient denies hitting his head. c/o right hip pain

## 2021-12-11 NOTE — PHYSICAL THERAPY INITIAL EVALUATION ADULT - MANUAL MUSCLE TESTING RESULTS, REHAB EVAL
functional 3+/5 R<L secondary to pain/grossly assessed due to functional 3+/5 RLE<LLE secondary to pain/grossly assessed due to

## 2021-12-11 NOTE — ED ADULT NURSE NOTE - OBJECTIVE STATEMENT
Pt is A&Ox4, appears uncomfortable. Patient BIBEMS s/p trip and fall on to his right hip. Patient denies LOC or hitting his head. No obvious deformity noted. Reports hx of lumbar surgery + pain to his left hip. Pending xray at this time. Will continue to monitor.

## 2021-12-11 NOTE — ED STATDOCS - CARE PLAN
Principal Discharge DX:	Contusion, buttock, initial encounter  Secondary Diagnosis:	Gait instability   1

## 2021-12-11 NOTE — ED ADULT NURSE REASSESSMENT NOTE - NS ED NURSE REASSESS COMMENT FT1
Assumed care of pt from previous RN. Pt awake, alert, NAD. Breathing even and unlabored. Offering no complaints.

## 2021-12-11 NOTE — ED STATDOCS - NSICDXPASTMEDICALHX_GEN_ALL_CORE_FT
PAST MEDICAL HISTORY:  Anemia     Back pain s/p lumbar lami    CAD (coronary artery disease) with stent placement    Carpal tunnel syndrome, bilateral s/p surgery    Diabetes     DJD (degenerative joint disease)     H/O ventricular tachycardia     High cholesterol     Hypertension     Macular degeneration     Obesity     Reactive airway disease     Sleep apnea Intolerant of CPAP    Umbilical hernia     Ventricular tachycardia

## 2021-12-11 NOTE — PROVIDER CONTACT NOTE (OTHER) - ASSESSMENT
Bed mobility: Min A  STS: Min A  Amb: CGA with RW x 50ft  Stair negotiation: 8in step, B HRs, Max a x 1 - pt verbalizing severe fear of falling, home set up much easier

## 2021-12-11 NOTE — ED STATDOCS - MUSCULOSKELETAL, MLM
neck supple full ROM. Right lateral posterior tenderness. Mild pain on active motion. neck supple full ROM. R hip with lateral posterior tenderness. Mild pain on active motion, NVI

## 2021-12-11 NOTE — ED ADULT NURSE NOTE - NSIMPLEMENTINTERV_GEN_ALL_ED
Implemented All Fall with Harm Risk Interventions:  [place] to call system. Call bell, personal items and telephone within reach. Instruct patient to call for assistance. Room bathroom lighting operational. Non-slip footwear when patient is off stretcher. Physically safe environment: no spills, clutter or unnecessary equipment. Stretcher in lowest position, wheels locked, appropriate side rails in place. Provide visual cue, wrist band, yellow gown, etc. Monitor gait and stability. Monitor for mental status changes and reorient to person, place, and time. Review medications for side effects contributing to fall risk. Reinforce activity limits and safety measures with patient and family. Provide visual clues: red socks.

## 2021-12-11 NOTE — ED STATDOCS - PROGRESS NOTE DETAILS
X-rays neg.  Attempted trial of ambulation but pt unsteady and will place on OBS for PT eval this AM

## 2021-12-11 NOTE — ED CDU PROVIDER INITIAL DAY NOTE - WET READ LAUNCH FT
"   LOS: 8 days   Patient Care Team:  Neo Gresham DO as PCP - General (Family Medicine)    Subjective resting comfortably    Objective    Vital Sign Min/Max for last 24 hours  Temp  Min: 97.7 °F (36.5 °C)  Max: 98.7 °F (37.1 °C)   BP  Min: 92/45  Max: 216/105   Pulse  Min: 81  Max: 121   Resp  Min: 18  Max: 24   SpO2  Min: 94 %  Max: 100 %   Flow (L/min)  Min: 3  Max: 6   No Data Recorded     Flowsheet Rows         First Filed Value    Admission Height  61\" (154.9 cm) Documented at 06/13/2017 0525    Admission Weight  97 lb (44 kg) Documented at 06/13/2017 0525          Physical Exam:    Wound:    Pulses:     Mediastinal and Chest Tube Drainage:       Results Review: Chest x-ray satisfactory    Results from last 7 days  Lab Units 06/20/17  0346   WBC 10*3/mm3 10.36   HEMOGLOBIN g/dL 11.1*   HEMATOCRIT % 36.2   PLATELETS 10*3/mm3 240       Results from last 7 days  Lab Units 06/20/17  0346   SODIUM mmol/L 136   POTASSIUM mmol/L 3.7   CHLORIDE mmol/L 102   TOTAL CO2 mmol/L 20.0   BUN mg/dL 26*   CREATININE mg/dL 4.60*   GLUCOSE mg/dL 69*   CALCIUM mg/dL 8.8       Results from last 7 days  Lab Units 06/15/17  0340   PH, ARTERIAL pH units 7.416   PO2 ART mm Hg 119.0*   PCO2, ARTERIAL mm Hg 31.2*   HCO3 ART mmol/L 20.0         Assessment    Principal Problem:    Loculated pleural effusion  Active Problems:    Tobacco use    Bipolar disorder    Depression    Hyperparathyroidism    Pneumonia of right lung due to infectious organism    ESRD (end stage renal disease) on dialysis    Renovascular hypertension    Hepatitis C antibody positive in blood    IV drug abuse    Anemia of ESRD    COPD exacerbation    Medical non-compliance      We will see back in the office        Shawn Hicks MD  06/20/17  7:04 AM      Please note that portions of this note were completed with a voice recognition program. Efforts were made to edit the dictations, but words may be mistranscribed  " There is 1 Wet Read(s) to document. There are no Wet Read(s) to document.

## 2021-12-11 NOTE — ED STATDOCS - CLINICAL SUMMARY MEDICAL DECISION MAKING FREE TEXT BOX
Tylenol and x-rays. Attempt ambulation trail if x-ray is negative. If cannot walk will give ct-scan.

## 2021-12-11 NOTE — ED ADULT NURSE REASSESSMENT NOTE - NS ED NURSE REASSESS COMMENT FT1
Patient educated on x-ray results. Attempted to ambulate patient with additional RN. Patient is able to bear weight on both legs but is unable to take more than one step without holding on to side railing. Dr. Francis aware. Patient to be seen by PT tomorrow.

## 2021-12-11 NOTE — ED PROVIDER NOTE - CARE PLAN
1 Principal Discharge DX:	Contusion, buttock, initial encounter  Secondary Diagnosis:	Gait instability

## 2021-12-11 NOTE — ED CDU PROVIDER DISPOSITION NOTE - ATTENDING CONTRIBUTION TO CARE
77 y/o M c/o mechanical fall - imaging negative, pt cleared by PT - spoke with patient's home aide, Anil, who will  up

## 2021-12-11 NOTE — ED CDU PROVIDER DISPOSITION NOTE - PATIENT PORTAL LINK FT
You can access the FollowMyHealth Patient Portal offered by [facility] by registering at the following website: [email or web address]. By joining [employer]’s FollowMyHealth portal, you will also be able to view your health information using other applications (apps) compatible with our system.

## 2021-12-11 NOTE — PHYSICAL THERAPY INITIAL EVALUATION ADULT - ADDITIONAL COMMENTS
Verbalizes living in 1 story home with 1 platform step + 2 steps with B HRs/grab bars to enter home. Reports having 24/7 aide who assists with all IADLS and MRADLs including assistance on stairs, showers, dressing, bed mobility. Pt has RW x2, SAC, WC, shower chair, grab bars.

## 2022-01-10 ENCOUNTER — APPOINTMENT (OUTPATIENT)
Dept: ORTHOPEDIC SURGERY | Facility: CLINIC | Age: 77
End: 2022-01-10
Payer: MEDICARE

## 2022-02-17 ENCOUNTER — APPOINTMENT (OUTPATIENT)
Dept: ORTHOPEDIC SURGERY | Facility: CLINIC | Age: 77
End: 2022-02-17

## 2022-03-24 NOTE — PATIENT PROFILE ADULT. - FUNCTIONAL SCREEN CURRENT LEVEL: TRANSFERRING, MLM
(3) assistive equipment and person Griseofulvin Counseling:  I discussed with the patient the risks of griseofulvin including but not limited to photosensitivity, cytopenia, liver damage, nausea/vomiting and severe allergy.  The patient understands that this medication is best absorbed when taken with a fatty meal (e.g., ice cream or french fries).

## 2022-03-29 ENCOUNTER — APPOINTMENT (OUTPATIENT)
Dept: ORTHOPEDIC SURGERY | Facility: CLINIC | Age: 77
End: 2022-03-29
Payer: MEDICARE

## 2022-03-29 VITALS
BODY MASS INDEX: 29.8 KG/M2 | SYSTOLIC BLOOD PRESSURE: 118 MMHG | WEIGHT: 220 LBS | HEART RATE: 62 BPM | DIASTOLIC BLOOD PRESSURE: 63 MMHG | HEIGHT: 72 IN

## 2022-03-29 DIAGNOSIS — Z98.1 ARTHRODESIS STATUS: ICD-10-CM

## 2022-03-29 PROCEDURE — 72040 X-RAY EXAM NECK SPINE 2-3 VW: CPT

## 2022-03-29 PROCEDURE — 99213 OFFICE O/P EST LOW 20 MIN: CPT

## 2022-03-29 NOTE — PHYSICAL EXAM
[Obese] : obese [Poor Appearance] : well-appearing [Acute Distress] : not in acute distress [de-identified] : CONSTITUTIONAL: Patient is a very pleasant individual who is well-nourished and appears stated age. In a seated position, accompanied by a caregiver\par PSYCHIATRIC: Alert and oriented times three and in no apparent distress, and participates with orthopedic evaluation well.\par HEAD: Atraumatic and nonsyndromic in appearance.\par EENT: No thyromegaly, EOMI.\par RESPIRATORY: Respiratory rate is regular, not dyspneic on examination.\par LYMPHATICS: There is no cervical or axillary lymphadenopathy.\par INTEGUMENTARY: Skin is clean, dry, and intact about the bilateral upper extremities and cervical spine. Anterior posterior incisions are well-healed\par VASCULAR: There is brisk capillary refill about the bilateral upper extremities and radial pulses are 2/4. \par NEUROLOGIC: Negative L'hirmitte, negative Spurling’s sign. There are no pathologic reflexes. There is no decrease in sensation of the bilateral upper extremities on Wartenberg pinwheel examination. Deep tendon reflexes are well-maintained at +2/4 of the bilateral upper extremities and are symmetric. Lower extremities do have diminished sensation are believe consistent with neuropathy\par MUSCULOSKELETAL: There is no visible muscular atrophy. Manual motor strength is well maintained in the bilateral upper extremities. Cervical range of motion is well maintained. The patient ambulates in a non-myelopathic manner. Normal secondary orthopaedic exam of bilateral shoulders, elbows and hands. Elbow flexion and extension, wrist extension, finger flexion and abduction are well maintained.  [de-identified] : AP and Lateral views of the cervical spine taken 03/29/2022 demonstrates S/p anterior posterior cervical fusion.

## 2022-03-29 NOTE — ADDENDUM
[FreeTextEntry1] : Documented by Manohar Black acting as a scribe for Dr. Jacky Fuller on 03/29/2022. All medical record entries made by the Scribe were at my, Dr. Jacky Fuller, direction and personally dictated by me on 03/29/2022 . I have reviewed the chart and agree that the record accurately reflects my personal performance of the history, physical exam, assessment and plan. I have also personally directed, reviewed, and agreed with the chart.

## 2022-03-29 NOTE — REASON FOR VISIT
[Follow-Up Visit] : a follow-up visit for [Neck Pain] : neck pain [Formal Caregiver] : formal caregiver

## 2022-03-29 NOTE — HISTORY OF PRESENT ILLNESS
[de-identified] : 75 year old M Presents S/p ACDF and posterior cervical fusion, Patient is stable. He has no acute neurologic changes. ELINA questionnaire stable. He is an at home ambulator.  [Ataxia] : no ataxia [Incontinence] : no incontinence [Loss of Dexterity] : good dexterity [Urinary Ret.] : no urinary retention

## 2022-03-29 NOTE — DISCUSSION/SUMMARY
[de-identified] : We talked about the nature of the condition and treatment options. Anticipatory guidance regarding S/p ACDF was given. Continue with care under home health aid. Continue with ADLs as tolerated at this time. The patient will follow up in 1 year for a repeat clinical assessment. \par \par Prior to appointment and during encounter with patient extensive medical records were reviewed including but not limited to, hospital records, out patient records, imaging results, and lab data. During this appointment the patient was examined, diagnoses were discussed and explained in a face to face manner. In addition extensive time was spent reviewing aforementioned diagnostic studies. Counseling including abnormal image results, differential diagnoses, treatment options, risk and benefits, lifestyle changes, current condition, and current medications was performed. Patient's comments, questions, and concerns were address and patient verbalized understanding. Based on this patient's presentation at our office, which is an orthopedic spine surgeon's office, this patient inherently / intrinsically has a risk, however minute, of developing  issues such as Cauda equina syndrome, bowel and bladder changes, or progression of motor or neurological deficits such as paralysis which may be permanent.

## 2022-04-25 ENCOUNTER — APPOINTMENT (OUTPATIENT)
Dept: ORTHOPEDIC SURGERY | Facility: CLINIC | Age: 77
End: 2022-04-25
Payer: MEDICARE

## 2022-04-25 VITALS
SYSTOLIC BLOOD PRESSURE: 130 MMHG | HEIGHT: 70 IN | TEMPERATURE: 97.1 F | DIASTOLIC BLOOD PRESSURE: 71 MMHG | WEIGHT: 225 LBS | HEART RATE: 63 BPM | BODY MASS INDEX: 32.21 KG/M2

## 2022-04-25 DIAGNOSIS — M17.11 UNILATERAL PRIMARY OSTEOARTHRITIS, RIGHT KNEE: ICD-10-CM

## 2022-04-25 DIAGNOSIS — Z47.1 AFTERCARE FOLLOWING JOINT REPLACEMENT SURGERY: ICD-10-CM

## 2022-04-25 DIAGNOSIS — Z96.659 AFTERCARE FOLLOWING JOINT REPLACEMENT SURGERY: ICD-10-CM

## 2022-04-25 DIAGNOSIS — Z96.652 PRESENCE OF LEFT ARTIFICIAL KNEE JOINT: ICD-10-CM

## 2022-04-25 PROCEDURE — 20610 DRAIN/INJ JOINT/BURSA W/O US: CPT | Mod: RT

## 2022-04-25 PROCEDURE — 99214 OFFICE O/P EST MOD 30 MIN: CPT | Mod: 25

## 2022-04-25 PROCEDURE — 73562 X-RAY EXAM OF KNEE 3: CPT | Mod: RT

## 2022-04-25 NOTE — PROCEDURE
[de-identified] : I injected the patient's right knee today with cortisone for primary osteoarthritis.\par \par I discussed at length with the patient the planned steroid and lidocaine injection. The risks, benefits, convalescence and alternatives were reviewed. The possible side effects discussed included but were not limited to: pain, swelling, heat, bleeding, and redness. Symptoms are generally mild but if they are extensive then contact the office. Giving pain relievers by mouth such as NSAIDs or Tylenol can generally treat the reactions to steroid and lidocaine. Rare cases of infection have been noted. Rash, hives and itching may occur post injection. If you have muscle pain or cramps, flushing and or swelling of the face, rapid heart beat, nausea, dizziness, fever, chills, headache, difficulty breathing, swelling in the arms or legs, or have a prickly feeling of your skin, contact a health care provider immediately. Following this discussion, the knee was prepped with Alcohol and under sterile condition the 80 mg Depo-Medrol and 6 cc Lidocaine injection was performed with a 20 gauge needle through a superolateral injection site. The needle was introduced into the joint, aspiration was performed to ensure intra-articular placement and the medication was injected. Upon withdrawal of the needle the site was cleaned with alcohol and a band aid applied. The patient tolerated the injection well and there were no adverse effects. Post injection instructions included no strenuous activity for 24 hours, cryotherapy and if there are any adverse effects to contact the office.

## 2022-04-25 NOTE — REASON FOR VISIT
[Follow-Up Visit] : a follow-up visit for [Formal Caregiver] : formal caregiver [FreeTextEntry2] : right knee pain

## 2022-04-25 NOTE — END OF VISIT
[FreeTextEntry3] : I, Geovani Jacobson, acted solely as a scribe for Dr. Arie Randall on this date 04/25/2022.

## 2022-04-25 NOTE — DISCUSSION/SUMMARY
[Medication Risks Reviewed] : Medication risks reviewed [de-identified] : 78 y/o M with bone on bone medial compartmental osteoarthritis with varus deformity of the right knee. We discussed the nature of the condition and the treatment options. Based on imaging, he is a candidate for a right TKA once he fails conservative therapies. He is still getting some relief with intermittent cortisone injections and opted for another injection today. He tolerated the cortisone injection well and may repeat it in three months if needed. \par \par The patient is a 77 year old individual with end stage arthritis of their right knee joint. Based upon the patient's continued symptoms and failure to respond to conservative treatment I have recommended a staged right total knee arthroplasty for this patient. A long discussion took place with the patient describing what a total joint replacement is and what the procedure would entail. A total knee arthroplasty model, similar to the implant that will be used during the operation, was utilized to demonstrate and to discuss the various bearing surfaces of the implants. The hospitalization and post-operative care and rehabilitation were also discussed. The use of perioperative antibiotics and DVT prophylaxis were discussed. The risk, benefits and alternatives to a surgical intervention were discussed at length with the patient. The patient was also advised of risks related to the medical comorbidities, elevated body mass index (BMI), and smoking where applicable. We discussed how to reduce modifiable risk factors and encouraged smoking cessation were applicable.. A lengthy discussion took place to review the most common complications including but not limited to: deep vein thrombosis, pulmonary embolus, heart attack, stroke, infection, wound breakdown, numbness, damage to nerves, tendon, muscles, arteries or other blood vessels, death and other possible complications from anesthesia. The patient was told that we will take steps to minimize these risks by using sterile technique, antibiotics and DVT prophylaxis when appropriate and follow the patient postoperatively in the office setting to monitor progress. The possibility of recurrent pain, no improvement in pain and actual worsening of pain were also discussed with the patient.\par The discharge plan of care focused on the patient going home following surgery. The patient was encouraged to make the necessary arrangements to have someone stay with them when they are discharged home. Following discharge, a home care nurse will visit the patient. The home care nurse will open your home care case and request home physical therapy services. Home physical therapy will commence following discharge provided it is appropriate and covered by the health insurance benefit plan. \par The benefits of surgery were discussed with the patient including the potential for improving his current clinical condition through operative intervention. Alternatives to surgical intervention including continued conservative management were also discussed in detail. All questions were answered to the satisfaction of the patient. The treatment plan of care, as well as a model of a total knee arthroplasty equivalent to the one that will be used for their total joint replacement, was shared with the patient. The patient agreed to the plan of care as well as the use of implants in their total joint replacement. \par \par The patient is a 76 year individual with end stage arthritis of their left knee joint. Based upon the patient's continued symptoms and failure to respond to conservative treatment, pt successfully completed left total knee arthroplasty. A long discussion took place with the patient describing what a total joint replacement is and what the procedure would entail. A total knee arthroplasty model, similar to the implant that was used during the operation, was utilized to demonstrate and to discuss the various bearing surfaces of the implants. The hospitalization and post-operative care and rehabilitation were also discussed. The use of perioperative antibiotics and DVT prophylaxis were discussed. The risk, benefits and alternatives to a surgical intervention were discussed at length with the patient. The patient was also advised of risks related to the medical comorbidities, elevated body mass index (BMI), and smoking where applicable. We discussed how to reduce modifiable risk factors and encouraged smoking cessation were applicable.. A lengthy discussion took place to review the most common complications including but not limited to: deep vein thrombosis, pulmonary embolus, heart attack, stroke, infection, wound breakdown, numbness, damage to nerves, tendon, muscles, arteries or other blood vessels, death and other possible complications from anesthesia. The patient was told that we will take steps to minimize these risks by using sterile technique, antibiotics and DVT prophylaxis when appropriate and follow the patient postoperatively in the office setting to monitor progress. The possibility of recurrent pain, no improvement in pain and actual worsening of pain were also discussed with the patient.\par The discharge plan of care focused on the patient going home following surgery. The patient was encouraged to make the necessary arrangements to have someone stay with them when they are discharged home. Following discharge, a home care nurse was to the patient. The home care nurse would open the patient’s home care case and request home physical therapy services. Home physical therapy was to commence following discharge provided it was appropriate and covered by the health insurance benefit plan. \par The benefits of surgery were discussed with the patient including the potential for improving his current clinical condition through operative intervention. Alternatives to surgical intervention including continued conservative management were also discussed in detail. All questions were answered to the satisfaction of the patient. The treatment plan of care, as well as a model of a total knee arthroplasty equivalent to the one that will be used for their total joint replacement, was shared with the patient. The patient agreed to the plan of care as well as the use of implants in their total joint replacement.

## 2022-04-25 NOTE — HISTORY OF PRESENT ILLNESS
[Pain Location] : pain [1] : a minimum pain level of 1/10 [Walking] : worsened by walking [Rest] : relieved by rest [4] : a current pain level of 4/10 [8] : a maximum pain level of 8/10 [de-identified] : 76 y/o M presents with right knee pain. His pain is in the medial knee and he has a known hx of bone on bone medial compartmental osteoarthritis. He had a cortisone injection on 9/13/2021 which provided some relief. He feels like his knee has worsened since his last visit. He has pain with walking, but does not have pain at rest. He uses a wheelchair when he is out of the house, but doesn't always use it at home. At home he sometimes uses a walker. He is using a compressive sleeve. The right knee clicks. He had a right knee scope many years ago. He is also s/p left TKA in 2015. Pt reports that he has spine issues and left sided weakness. He takes tramadol, and is being followed by pain management Dr. Stevens. [Ataxia] : no ataxia [Incontinence] : no incontinence [Loss of Dexterity] : good dexterity [Urinary Ret.] : no urinary retention [de-identified] : stairs, twisting

## 2022-04-25 NOTE — PHYSICAL EXAM
[Walker] : ambulates with walker [Wheelchair] : uses a wheelchair [LE] : Sensory: Intact in bilateral lower extremities [ALL] : dorsalis pedis, posterior tibial, femoral, popliteal, and radial 2+ and symmetric bilaterally [Normal] : Alert and in no acute distress [Poor Appearance] : well-appearing [de-identified] : GENERAL APPEARANCE:   Well nourished and hydrated, pleasant, alert, and oriented x 4.  \par CARDIOVASCULAR:   No apparent abnormalities.  No lower leg edema.  No varicosities.  Pedal pulses are palpable.\par RESPIRATORY: Breathe sound clear and audible in all lobes. No wheezing, No accessory muscle use.\par NEUROLOGIC:   Sensation is normal, no muscle weakness in the upper or lower extremities.\par DERMATOLOGIC:   No apparent skin lesions, moist, warm, no rash.\par SPINE:   Cervical spine appears normal and moves freely; thoracic spine appears normal and moves freely; lumbosacral spine appears normal and moves freely, normal, nontender.\par MUSCULOSKELETAL:   Hands, wrists, and elbows are normal and move freely, shoulders are normal and move freely. \par  [de-identified] : right knee examination shows knees and severe varus alignment, medial joint line tenderness, his active range of motion is a ° \par left knee midline incision is healed, he has no joint effusion no erythema active range of motion is 5-110°.  [de-identified] : 3V xray of the right knee done in the office today and reviewed by Dr. Arie Randall demonstrates bone on bone medial compartmental osteoarthritis

## 2022-08-01 NOTE — ED ADULT TRIAGE NOTE - HEIGHT IN FEET
5
Functionally assessed atleast 3+/5 for B UE and LE while pushing up from bed into sitting./grossly assessed due to

## 2022-08-02 ENCOUNTER — EMERGENCY (EMERGENCY)
Facility: HOSPITAL | Age: 77
LOS: 1 days | Discharge: HOME | End: 2022-08-02
Attending: STUDENT IN AN ORGANIZED HEALTH CARE EDUCATION/TRAINING PROGRAM
Payer: MEDICARE

## 2022-08-02 VITALS
SYSTOLIC BLOOD PRESSURE: 120 MMHG | TEMPERATURE: 98 F | WEIGHT: 199.96 LBS | DIASTOLIC BLOOD PRESSURE: 62 MMHG | RESPIRATION RATE: 20 BRPM | HEART RATE: 60 BPM | HEIGHT: 71 IN | OXYGEN SATURATION: 98 %

## 2022-08-02 VITALS
OXYGEN SATURATION: 97 % | SYSTOLIC BLOOD PRESSURE: 133 MMHG | RESPIRATION RATE: 18 BRPM | TEMPERATURE: 98 F | DIASTOLIC BLOOD PRESSURE: 71 MMHG | HEART RATE: 52 BPM

## 2022-08-02 DIAGNOSIS — Z12.11 ENCOUNTER FOR SCREENING FOR MALIGNANT NEOPLASM OF COLON: Chronic | ICD-10-CM

## 2022-08-02 DIAGNOSIS — Z95.1 PRESENCE OF AORTOCORONARY BYPASS GRAFT: Chronic | ICD-10-CM

## 2022-08-02 DIAGNOSIS — G56.00 CARPAL TUNNEL SYNDROME, UNSPECIFIED UPPER LIMB: Chronic | ICD-10-CM

## 2022-08-02 DIAGNOSIS — Z98.89 OTHER SPECIFIED POSTPROCEDURAL STATES: Chronic | ICD-10-CM

## 2022-08-02 DIAGNOSIS — Z96.652 PRESENCE OF LEFT ARTIFICIAL KNEE JOINT: Chronic | ICD-10-CM

## 2022-08-02 PROCEDURE — 99283 EMERGENCY DEPT VISIT LOW MDM: CPT

## 2022-08-02 PROCEDURE — 99282 EMERGENCY DEPT VISIT SF MDM: CPT

## 2022-08-02 RX ORDER — ACETAMINOPHEN 500 MG
975 TABLET ORAL ONCE
Refills: 0 | Status: COMPLETED | OUTPATIENT
Start: 2022-08-02 | End: 2022-08-02

## 2022-08-02 RX ADMIN — Medication 975 MILLIGRAM(S): at 21:03

## 2022-08-02 NOTE — ED ADULT TRIAGE NOTE - CHIEF COMPLAINT QUOTE
Right toe pain on /off for a few day, patient denies any fever, VSS, patient currently on Gabapentin for pain, resp even/unlabored.

## 2022-08-02 NOTE — ED PROVIDER NOTE - NS ED ROS FT
Const: Denies fever, chills  HEENT: Denies blurry vision, sore throat  Neck: Denies neck pain/stiffness  Resp: Denies coughing, SOB  Cardiovascular: Denies CP, palpitations, LE edema  GI: Denies nausea, vomiting, abdominal pain, diarrhea, constipation, blood in stool  : Denies urinary frequency/urgency/dysuria, hematuria  MSK: + right 5th toe pain   Neuro: Denies HA, dizziness, numbness, weakness  Skin: Denies rashes.

## 2022-08-02 NOTE — ED ADULT NURSE NOTE - NSIMPLEMENTINTERV_GEN_ALL_ED
Implemented All Fall Risk Interventions:  [place] to call system. Call bell, personal items and telephone within reach. Instruct patient to call for assistance. Room bathroom lighting operational. Non-slip footwear when patient is off stretcher. Physically safe environment: no spills, clutter or unnecessary equipment. Stretcher in lowest position, wheels locked, appropriate side rails in place. Provide visual cue, wrist band, yellow gown, etc. Monitor gait and stability. Monitor for mental status changes and reorient to person, place, and time. Review medications for side effects contributing to fall risk. Reinforce activity limits and safety measures with patient and family.

## 2022-08-02 NOTE — ED ADULT NURSE NOTE - OBJECTIVE STATEMENT
pt reports R pinky toe pain. pt reports chronic pain and requested gabapentin to home aide however he wanted him to be evaluated in ED. pt medicated for pain. NAD noted.

## 2022-08-02 NOTE — ED PROVIDER NOTE - OBJECTIVE STATEMENT
78 y/o male with PMHx of CAD, DM, HLD presents to the ED c/o "nerve pain" to right 5th digit, which he states happens quite often. Pt states pain is stabbing in nature. Pt states he is not having pain currently. Pt states pain occurred when he was wearing his sneaker and pain improved after removing his sneaker. Pt lives at home with full time home health aid. Pt ambulates with walker and cane. Pt denies trauma. Pt did not take anything for his pain.    Pt home aid: [phone], pt states aid can pick him up

## 2022-08-02 NOTE — ED PROVIDER NOTE - PHYSICAL EXAMINATION
Gen: Well appearing in NAD  Head: NC/AT  Neck: trachea midline  Resp:  No distress  Ext: no deformities  Neuro:  A&O appears non focal  Skin: good cap refill, no ulceration 2+ dp pulse, no edema, no ttp to 5th metatarsal   Psych:  Normal affect and mood

## 2022-08-02 NOTE — ED PROVIDER NOTE - PROGRESS NOTE DETAILS
Alexander: I called and left message for aide As per sign-out from Dr. Albarado, patient pending 24 hour aide to come pick him up. Patient has been in no distress. Aide currently in the department to take him home.

## 2022-08-02 NOTE — ED PROVIDER NOTE - PATIENT PORTAL LINK FT
You can access the FollowMyHealth Patient Portal offered by [facility] by registering at the following website: [email or web address]. By joining [employer]’s FollowMyHealth portal, you will also be able to view your health information using other applications (apps) compatible with our system.

## 2022-08-02 NOTE — ED PROVIDER NOTE - NSCAREINITIATED _GEN_ER
Hospitalist
Infectious Disease
Orthopedics
Destiny Munoz(Attending)

## 2022-08-22 ENCOUNTER — APPOINTMENT (OUTPATIENT)
Dept: ORTHOPEDIC SURGERY | Facility: CLINIC | Age: 77
End: 2022-08-22

## 2022-08-25 ENCOUNTER — EMERGENCY (EMERGENCY)
Facility: HOSPITAL | Age: 77
LOS: 1 days | Discharge: HOME | End: 2022-08-25
Attending: EMERGENCY MEDICINE
Payer: MEDICARE

## 2022-08-25 VITALS
DIASTOLIC BLOOD PRESSURE: 75 MMHG | SYSTOLIC BLOOD PRESSURE: 132 MMHG | RESPIRATION RATE: 18 BRPM | HEIGHT: 71 IN | HEART RATE: 64 BPM | OXYGEN SATURATION: 97 % | TEMPERATURE: 98 F | WEIGHT: 169.98 LBS

## 2022-08-25 DIAGNOSIS — Z98.89 OTHER SPECIFIED POSTPROCEDURAL STATES: Chronic | ICD-10-CM

## 2022-08-25 DIAGNOSIS — Z96.652 PRESENCE OF LEFT ARTIFICIAL KNEE JOINT: Chronic | ICD-10-CM

## 2022-08-25 DIAGNOSIS — Z95.1 PRESENCE OF AORTOCORONARY BYPASS GRAFT: Chronic | ICD-10-CM

## 2022-08-25 DIAGNOSIS — G56.00 CARPAL TUNNEL SYNDROME, UNSPECIFIED UPPER LIMB: Chronic | ICD-10-CM

## 2022-08-25 DIAGNOSIS — Z12.11 ENCOUNTER FOR SCREENING FOR MALIGNANT NEOPLASM OF COLON: Chronic | ICD-10-CM

## 2022-08-25 LAB
ALBUMIN SERPL ELPH-MCNC: 4 G/DL — SIGNIFICANT CHANGE UP (ref 3.3–5.2)
ALP SERPL-CCNC: 98 U/L — SIGNIFICANT CHANGE UP (ref 40–120)
ALT FLD-CCNC: 18 U/L — SIGNIFICANT CHANGE UP
ANION GAP SERPL CALC-SCNC: 13 MMOL/L — SIGNIFICANT CHANGE UP (ref 5–17)
APTT BLD: 29.9 SEC — SIGNIFICANT CHANGE UP (ref 27.5–35.5)
AST SERPL-CCNC: 15 U/L — SIGNIFICANT CHANGE UP
BASOPHILS # BLD AUTO: 0.04 K/UL — SIGNIFICANT CHANGE UP (ref 0–0.2)
BASOPHILS NFR BLD AUTO: 0.4 % — SIGNIFICANT CHANGE UP (ref 0–2)
BILIRUB SERPL-MCNC: 0.8 MG/DL — SIGNIFICANT CHANGE UP (ref 0.4–2)
BUN SERPL-MCNC: 34.4 MG/DL — HIGH (ref 8–20)
CALCIUM SERPL-MCNC: 8.8 MG/DL — SIGNIFICANT CHANGE UP (ref 8.4–10.5)
CHLORIDE SERPL-SCNC: 104 MMOL/L — SIGNIFICANT CHANGE UP (ref 98–107)
CO2 SERPL-SCNC: 23 MMOL/L — SIGNIFICANT CHANGE UP (ref 22–29)
CREAT SERPL-MCNC: 1.16 MG/DL — SIGNIFICANT CHANGE UP (ref 0.5–1.3)
EGFR: 65 ML/MIN/1.73M2 — SIGNIFICANT CHANGE UP
EOSINOPHIL # BLD AUTO: 0.09 K/UL — SIGNIFICANT CHANGE UP (ref 0–0.5)
EOSINOPHIL NFR BLD AUTO: 0.9 % — SIGNIFICANT CHANGE UP (ref 0–6)
GLUCOSE SERPL-MCNC: 148 MG/DL — HIGH (ref 70–99)
HCT VFR BLD CALC: 34.2 % — LOW (ref 39–50)
HGB BLD-MCNC: 11.7 G/DL — LOW (ref 13–17)
IMM GRANULOCYTES NFR BLD AUTO: 0.5 % — SIGNIFICANT CHANGE UP (ref 0–1.5)
INR BLD: 0.97 RATIO — SIGNIFICANT CHANGE UP (ref 0.88–1.16)
LYMPHOCYTES # BLD AUTO: 1.33 K/UL — SIGNIFICANT CHANGE UP (ref 1–3.3)
LYMPHOCYTES # BLD AUTO: 12.8 % — LOW (ref 13–44)
MCHC RBC-ENTMCNC: 32.1 PG — SIGNIFICANT CHANGE UP (ref 27–34)
MCHC RBC-ENTMCNC: 34.2 GM/DL — SIGNIFICANT CHANGE UP (ref 32–36)
MCV RBC AUTO: 93.7 FL — SIGNIFICANT CHANGE UP (ref 80–100)
MONOCYTES # BLD AUTO: 0.53 K/UL — SIGNIFICANT CHANGE UP (ref 0–0.9)
MONOCYTES NFR BLD AUTO: 5.1 % — SIGNIFICANT CHANGE UP (ref 2–14)
NEUTROPHILS # BLD AUTO: 8.38 K/UL — HIGH (ref 1.8–7.4)
NEUTROPHILS NFR BLD AUTO: 80.3 % — HIGH (ref 43–77)
PLATELET # BLD AUTO: 229 K/UL — SIGNIFICANT CHANGE UP (ref 150–400)
POTASSIUM SERPL-MCNC: 4.6 MMOL/L — SIGNIFICANT CHANGE UP (ref 3.5–5.3)
POTASSIUM SERPL-SCNC: 4.6 MMOL/L — SIGNIFICANT CHANGE UP (ref 3.5–5.3)
PROT SERPL-MCNC: 6.6 G/DL — SIGNIFICANT CHANGE UP (ref 6.6–8.7)
PROTHROM AB SERPL-ACNC: 11.3 SEC — SIGNIFICANT CHANGE UP (ref 10.5–13.4)
RBC # BLD: 3.65 M/UL — LOW (ref 4.2–5.8)
RBC # FLD: 13.2 % — SIGNIFICANT CHANGE UP (ref 10.3–14.5)
SARS-COV-2 RNA SPEC QL NAA+PROBE: SIGNIFICANT CHANGE UP
SODIUM SERPL-SCNC: 140 MMOL/L — SIGNIFICANT CHANGE UP (ref 135–145)
WBC # BLD: 10.42 K/UL — SIGNIFICANT CHANGE UP (ref 3.8–10.5)
WBC # FLD AUTO: 10.42 K/UL — SIGNIFICANT CHANGE UP (ref 3.8–10.5)

## 2022-08-25 PROCEDURE — 99218: CPT | Mod: FS

## 2022-08-25 PROCEDURE — 71045 X-RAY EXAM CHEST 1 VIEW: CPT | Mod: 26

## 2022-08-25 RX ORDER — GABAPENTIN 400 MG/1
300 CAPSULE ORAL
Refills: 0 | Status: DISCONTINUED | OUTPATIENT
Start: 2022-08-25 | End: 2022-08-30

## 2022-08-25 RX ORDER — ATENOLOL 25 MG/1
25 TABLET ORAL DAILY
Refills: 0 | Status: DISCONTINUED | OUTPATIENT
Start: 2022-08-25 | End: 2022-08-30

## 2022-08-25 RX ORDER — NITROGLYCERIN 6.5 MG
1 CAPSULE, EXTENDED RELEASE ORAL DAILY
Refills: 0 | Status: DISCONTINUED | OUTPATIENT
Start: 2022-08-25 | End: 2022-08-30

## 2022-08-25 RX ORDER — METFORMIN HYDROCHLORIDE 850 MG/1
500 TABLET ORAL
Refills: 0 | Status: DISCONTINUED | OUTPATIENT
Start: 2022-08-25 | End: 2022-08-30

## 2022-08-25 RX ORDER — TRAMADOL HYDROCHLORIDE 50 MG/1
50 TABLET ORAL EVERY 8 HOURS
Refills: 0 | Status: DISCONTINUED | OUTPATIENT
Start: 2022-08-25 | End: 2022-08-25

## 2022-08-25 RX ORDER — ATORVASTATIN CALCIUM 80 MG/1
40 TABLET, FILM COATED ORAL AT BEDTIME
Refills: 0 | Status: DISCONTINUED | OUTPATIENT
Start: 2022-08-25 | End: 2022-08-30

## 2022-08-25 RX ORDER — MAGNESIUM OXIDE 400 MG ORAL TABLET 241.3 MG
400 TABLET ORAL
Refills: 0 | Status: DISCONTINUED | OUTPATIENT
Start: 2022-08-25 | End: 2022-08-30

## 2022-08-25 RX ORDER — FERROUS SULFATE 325(65) MG
325 TABLET ORAL DAILY
Refills: 0 | Status: DISCONTINUED | OUTPATIENT
Start: 2022-08-25 | End: 2022-08-30

## 2022-08-25 RX ORDER — ALPRAZOLAM 0.25 MG
0.25 TABLET ORAL EVERY 8 HOURS
Refills: 0 | Status: DISCONTINUED | OUTPATIENT
Start: 2022-08-25 | End: 2022-08-25

## 2022-08-25 RX ORDER — ASPIRIN/CALCIUM CARB/MAGNESIUM 324 MG
325 TABLET ORAL DAILY
Refills: 0 | Status: DISCONTINUED | OUTPATIENT
Start: 2022-08-25 | End: 2022-08-30

## 2022-08-25 RX ORDER — TAMSULOSIN HYDROCHLORIDE 0.4 MG/1
0.4 CAPSULE ORAL AT BEDTIME
Refills: 0 | Status: DISCONTINUED | OUTPATIENT
Start: 2022-08-25 | End: 2022-08-30

## 2022-08-25 RX ORDER — MOMETASONE FUROATE 220 UG/1
1 INHALANT RESPIRATORY (INHALATION) AT BEDTIME
Refills: 0 | Status: DISCONTINUED | OUTPATIENT
Start: 2022-08-25 | End: 2022-08-30

## 2022-08-25 RX ORDER — BUMETANIDE 0.25 MG/ML
0.5 INJECTION INTRAMUSCULAR; INTRAVENOUS DAILY
Refills: 0 | Status: DISCONTINUED | OUTPATIENT
Start: 2022-08-25 | End: 2022-08-30

## 2022-08-25 RX ORDER — PANTOPRAZOLE SODIUM 20 MG/1
40 TABLET, DELAYED RELEASE ORAL
Refills: 0 | Status: DISCONTINUED | OUTPATIENT
Start: 2022-08-25 | End: 2022-08-30

## 2022-08-25 RX ORDER — OXYBUTYNIN CHLORIDE 5 MG
5 TABLET ORAL DAILY
Refills: 0 | Status: DISCONTINUED | OUTPATIENT
Start: 2022-08-25 | End: 2022-08-30

## 2022-08-25 RX ADMIN — MOMETASONE FUROATE 1 PUFF(S): 220 INHALANT RESPIRATORY (INHALATION) at 22:47

## 2022-08-25 RX ADMIN — ATORVASTATIN CALCIUM 40 MILLIGRAM(S): 80 TABLET, FILM COATED ORAL at 22:59

## 2022-08-25 RX ADMIN — TAMSULOSIN HYDROCHLORIDE 0.4 MILLIGRAM(S): 0.4 CAPSULE ORAL at 22:59

## 2022-08-25 NOTE — PROVIDER CONTACT NOTE (OTHER) - BACKGROUND
76 yo male with hx htn, dm, hld, cad , arthritis, bibems due to inability to care for himself. patient states he had a 24 hour aid who finished today aid is not coming back.

## 2022-08-25 NOTE — PROVIDER CONTACT NOTE (OTHER) - SITUATION
chart reviewed. order received. pt ok for PT per nursejessica. alma completed and charted. will follow.

## 2022-08-25 NOTE — ED ADULT NURSE NOTE - OBJECTIVE STATEMENT
Pt in ED to receive assistance. Has a 24hr aid who's last day was today. Family at bedside saying they will require assistance because pt cannot go back home alone. He requires 24hr care. Unable to dress, feed, walk or do anything else himself. Becomes confused at night and needs someone to help him around. Denies CP, SOB, dizziness. Presents with scabs and bruising on bilat arms. Denies itchiness. These blisters started coming out a month ago per pt.

## 2022-08-25 NOTE — ED PROVIDER NOTE - OBJECTIVE STATEMENT
78 yo male with hx htn, dm, hld, cad , arthritis, bibems due to inability to care for himself. patient states he had a 24 hour aid who finished today  aid is not coming back  patient ambulates with walker. deneis any recent falls  patient states he needs assistance with toileting and showering  as per patients family member Tricia [phone] they want patient in Nursing home and was advised to send him to ER

## 2022-08-25 NOTE — ED ADULT TRIAGE NOTE - CHIEF COMPLAINT QUOTE
pt states the he needs 24/7 home aide and his aide had to leave and he didn't have anyone to take care of him, p A&Ox4, resp even and unlabored no distress noted

## 2022-08-25 NOTE — ED ADULT NURSE REASSESSMENT NOTE - NS ED NURSE REASSESS COMMENT FT1
Received patient from [facility] RN.  Pt AxO4, VSS.  Pt denies chest pain/SOB at this time.  Cardio NSR on cardiac monitor.   IV insertion site  -, flushing without difficulty. Verbalized feeling better Assisted as needed  during the night Safety measures taken, bed in low position, call bell within reach, side rails up x2.  Plan of care explained.  Pt verbalized understanding.  Will continue to monitor. Awaiting for placement

## 2022-08-25 NOTE — PROVIDER CONTACT NOTE (OTHER) - ASSESSMENT
pt greeted awake on stretcher on room air, +texas catheter. AAOx4. left hip pain 0/10 rest, 3/10 with movement. agreeable to eval. pt required assist for all mobility at this time. pt returned to stretcher. left in nad w/all needs. nurse, zeus, and ccc, humble, made aware pt requesting nourishments. will follow.

## 2022-08-25 NOTE — ED ADULT NURSE NOTE - NSIMPLEMENTINTERV_GEN_ALL_ED
Implemented All Fall with Harm Risk Interventions:  [place] to call system. Call bell, personal items and telephone within reach. Instruct patient to call for assistance. Room bathroom lighting operational. Non-slip footwear when patient is off stretcher. Physically safe environment: no spills, clutter or unnecessary equipment. Stretcher in lowest position, wheels locked, appropriate side rails in place. Provide visual cue, wrist band, yellow gown, etc. Monitor gait and stability. Monitor for mental status changes and reorient to person, place, and time. Review medications for side effects contributing to fall risk. Reinforce activity limits and safety measures with patient and family. Provide visual clues: red socks.

## 2022-08-25 NOTE — ED CDU PROVIDER INITIAL DAY NOTE - MEDICAL DECISION MAKING DETAILS
Pt is a 77y M presenting to hospital because his full time aid left. He cannot take care of himself. Placed in obs for PT eval and CM/ SW consults for possible nursing home placement.

## 2022-08-25 NOTE — PHYSICAL THERAPY INITIAL EVALUATION ADULT - ADDITIONAL COMMENTS
pt states he lives alone in a 2-story house (on first floor) with 1 platform then 2 steps to enter. had a 24/7 aide to assist with ADLs and community. has 2 RW and a rollator, wc, cane, and shower chair.

## 2022-08-25 NOTE — PROVIDER CONTACT NOTE (OTHER) - ACTION/TREATMENT ORDERED:
Goals(1 week):1. independent bed mobility, 2. Modified Independent transfers with RW, 3. Modified Independent amb 50' with RW, 4. minimal assist 2 steps with cane. plan: strengthening, mobility.

## 2022-08-25 NOTE — ED PROVIDER NOTE - PROGRESS NOTE DETAILS
patient sent to ED for placement  d/w SW who will evaluate patient to be placed on obs pending pt, case management

## 2022-08-25 NOTE — PHYSICAL THERAPY INITIAL EVALUATION ADULT - PERTINENT HX OF CURRENT PROBLEM, REHAB EVAL
Statement: 78 yo male with hx htn, dm, hld, cad , arthritis, bibems due to inability to care for himself. patient states he had a 24 hour aid who finished today aid is not coming back. patient ambulates with walker. deneis any recent falls. patient states he needs assistance with toileting and showering.

## 2022-08-25 NOTE — ED CDU PROVIDER INITIAL DAY NOTE - OBJECTIVE STATEMENT
76 yo male with hx htn, dm, hld, cad , arthritis, bibems due to inability to care for himself. patient states he had a 24 hour aid who finished today aid is not coming back. patient ambulates with walker. deneis any recent falls. patient states he needs assistance with toileting and showering  as per patients family member Tricia [phone] they want patient in Nursing home and was advised to send him to ER. Pt denies any physical complaints at this time.

## 2022-08-26 VITALS
DIASTOLIC BLOOD PRESSURE: 71 MMHG | HEART RATE: 61 BPM | TEMPERATURE: 98 F | OXYGEN SATURATION: 94 % | RESPIRATION RATE: 18 BRPM | SYSTOLIC BLOOD PRESSURE: 114 MMHG

## 2022-08-26 PROCEDURE — 99283 EMERGENCY DEPT VISIT LOW MDM: CPT | Mod: 25

## 2022-08-26 PROCEDURE — 85730 THROMBOPLASTIN TIME PARTIAL: CPT

## 2022-08-26 PROCEDURE — U0003: CPT

## 2022-08-26 PROCEDURE — U0005: CPT

## 2022-08-26 PROCEDURE — 36415 COLL VENOUS BLD VENIPUNCTURE: CPT

## 2022-08-26 PROCEDURE — 85610 PROTHROMBIN TIME: CPT

## 2022-08-26 PROCEDURE — 94640 AIRWAY INHALATION TREATMENT: CPT

## 2022-08-26 PROCEDURE — G0378: CPT

## 2022-08-26 PROCEDURE — 71045 X-RAY EXAM CHEST 1 VIEW: CPT

## 2022-08-26 PROCEDURE — 80053 COMPREHEN METABOLIC PANEL: CPT

## 2022-08-26 PROCEDURE — 99217: CPT

## 2022-08-26 PROCEDURE — 85025 COMPLETE CBC W/AUTO DIFF WBC: CPT

## 2022-08-26 RX ADMIN — Medication 325 MILLIGRAM(S): at 11:22

## 2022-08-26 RX ADMIN — Medication 1 PATCH: at 11:23

## 2022-08-26 RX ADMIN — MAGNESIUM OXIDE 400 MG ORAL TABLET 400 MILLIGRAM(S): 241.3 TABLET ORAL at 09:11

## 2022-08-26 RX ADMIN — BUMETANIDE 0.5 MILLIGRAM(S): 0.25 INJECTION INTRAMUSCULAR; INTRAVENOUS at 07:17

## 2022-08-26 RX ADMIN — METFORMIN HYDROCHLORIDE 500 MILLIGRAM(S): 850 TABLET ORAL at 07:15

## 2022-08-26 RX ADMIN — Medication 10 MILLIGRAM(S): at 11:22

## 2022-08-26 RX ADMIN — PANTOPRAZOLE SODIUM 40 MILLIGRAM(S): 20 TABLET, DELAYED RELEASE ORAL at 09:10

## 2022-08-26 RX ADMIN — ATENOLOL 25 MILLIGRAM(S): 25 TABLET ORAL at 07:15

## 2022-08-26 RX ADMIN — MAGNESIUM OXIDE 400 MG ORAL TABLET 400 MILLIGRAM(S): 241.3 TABLET ORAL at 11:32

## 2022-08-26 RX ADMIN — Medication 5 MILLIGRAM(S): at 11:22

## 2022-08-26 RX ADMIN — GABAPENTIN 300 MILLIGRAM(S): 400 CAPSULE ORAL at 07:15

## 2022-08-26 NOTE — ED CDU PROVIDER DISPOSITION NOTE - PATIENT PORTAL LINK FT
You can access the FollowMyHealth Patient Portal offered by [facility] by registering at the following website: [email or web address]. By joining [employer]’s FollowMyHealth portal, you will also be able to view your health information using other applications (apps) compatible with our system.

## 2022-08-26 NOTE — CHART NOTE - NSCHARTNOTEFT_GEN_A_CORE
Patient from home, had a 24h/7d private hire aide who wound up leaving today which left the patient with no aide.  Patient told CCC that he is not able to take care of himself but cannot continue to afford to pay for a private hire aide.  CCC discussed various options with the patient.   Patients states his friends daughter, who is a , is working on a plan for him.  In the meantime, patient states he wants to get stronger and would like to go to [facility].  PT recommending [facility].  [facility] listing given to patient along with  booklet.  CCC/SW to follow.
SW Note: Worker alerted by CCC (Loreta) that as per P/T recc is for [facility]. [facility] reports that list of facilities have been provided to pt. Worker met with pt at bedside to discuss any facilities that he would prefer for placement. Pt reports that he would like to discuss [facility] options with both his friends and family in the AM, but was in agreement with [facility] being circulated to facilities around the [place] area, as he is from [place] and has friends from [place]. Pt also states that his friends had told him "good things" about a rehab in [place], but reports that he does not know the name or details about the facility. Worker circulated [facility] to facilities near [place] and [place]. Pending COVID result. No auth needed (niki medicare). SW to follow up in the AM.
SOCIAL WORK NOTE:  REVIEWED THE 5 BED OFFERS WITH PATIENT. PATIENT CHOSE THE [facility]UE TO CLOSE PROXIMITY OF THE SNF TO [place].  [facility] CONFIRMED THEY CAN ACCEPT THE PATIENT TODAY. AMBULANCE ARRANGED FOR 3:30 PM AND AMBULANCE BILLING FORM LEFT WITH THE PATIENT AND IS AT HIS BEDSIDE. SNF PACKET LEFT ON DC RACK FOR TRANSFER TODAY. PATIENT DENIED NEED TO CALL FRIENDS/ FAMILY.  NO MOLST FORM WITH PATIENT TO PLACE IN PACKET.

## 2022-08-26 NOTE — ED ADULT NURSE REASSESSMENT NOTE - STATUS
Subjective   Bakari Antonio Jr. is a 54 y.o. male.     History of Present Illness     He had his GB out and had low oxygen and low potassium while in the hospital  He has felt fine since that time  He just wants to make sure his potassium is normal      Bakari Antonio Jr.  is here for follow-up of hypertension of several years duration. He is not exercising and is not adherent to a low-salt diet. Patient does not check his blood pressure  Cardiovascular risk factors: hypertension, male gender and obesity (BMI >= 30 kg/m2). He is compliant with meds.      Mood has been doing well with zoloft  No SE with the medicine and he does need to continue this    The following portions of the patient's history were reviewed and updated as appropriate: allergies, current medications, past family history, past medical history, past social history, past surgical history and problem list.    Review of Systems   Constitutional: Negative.    Psychiatric/Behavioral: Negative.        Objective   Physical Exam  Vitals and nursing note reviewed.   Constitutional:       General: He is not in acute distress.     Appearance: Normal appearance. He is well-developed.   Cardiovascular:      Rate and Rhythm: Normal rate and regular rhythm.      Heart sounds: Normal heart sounds.   Pulmonary:      Effort: Pulmonary effort is normal.      Breath sounds: Normal breath sounds.   Neurological:      Mental Status: He is alert and oriented to person, place, and time.   Psychiatric:         Mood and Affect: Mood normal.         Behavior: Behavior normal.         Thought Content: Thought content normal.         Judgment: Judgment normal.         Assessment/Plan   Diagnoses and all orders for this visit:    1. Essential hypertension (Primary)  -     Comprehensive Metabolic Panel  -     triamterene-hydrochlorothiazide (DYAZIDE) 37.5-25 MG per capsule; Take 1 capsule by mouth Every Morning.  Dispense: 90 capsule; Refill: 1  -     valsartan 
(DIOVAN) 80 MG tablet; Take 1 tablet by mouth Daily.  Dispense: 90 tablet; Refill: 1  -     CBC & Differential    2. Essential hypertension  Comments:  Borderline control - see below  Orders:  -     Comprehensive Metabolic Panel  -     triamterene-hydrochlorothiazide (DYAZIDE) 37.5-25 MG per capsule; Take 1 capsule by mouth Every Morning.  Dispense: 90 capsule; Refill: 1  -     valsartan (DIOVAN) 80 MG tablet; Take 1 tablet by mouth Daily.  Dispense: 90 tablet; Refill: 1  -     CBC & Differential    3. Anxiety  Comments:  Start SSRI as noted.  Let me know if not steadily improving over the next couple of weeks.  Recheck with me in 2 to 3 months-24 7 on-call reminder  Orders:  -     sertraline (ZOLOFT) 100 MG tablet; One po q day  Dispense: 90 tablet; Refill: 1    4. Erectile dysfunction, unspecified erectile dysfunction type  -     sildenafil (Viagra) 100 MG tablet; Take 1 tablet by mouth Daily As Needed for Erectile Dysfunction.  Dispense: 30 tablet; Refill: 2    5. Hypokalemia  -     Comprehensive Metabolic Panel    will continue his BP medicine since it is working well,  He continues to lose weight and overall is getting healthier  Mood well controlled with zoloft, no change in regimen  Ok for PRN viagra  Will recheck potassium         
CARMELA placement/awaiting consult
SW for placement/awaiting consult
Awaiting for PT and SW evaluation/awaiting consult

## 2022-08-26 NOTE — ED CDU PROVIDER DISPOSITION NOTE - CLINICAL COURSE
78 y/o M presented to ER for CARMELA placement as his home aides left and he requires assistance. pt otherwise with no complaints. seen by PT recommended CARMELA, arranged by social work.

## 2022-08-26 NOTE — ED ADULT NURSE REASSESSMENT NOTE - COMFORT CARE
meal provided/plan of care explained/po fluids offered/repositioned/wait time explained
darkened lights/plan of care explained/po fluids offered/side rails up/treatment delay explained/wait time explained/warm blanket provided
meal provided/plan of care explained/po fluids offered/repositioned/wait time explained

## 2022-08-26 NOTE — ED ADULT NURSE REASSESSMENT NOTE - GENERAL PATIENT STATE
comfortable appearance/cooperative
anxious/cooperative/smiling/interactive
comfortable appearance/cooperative

## 2022-08-26 NOTE — ED ADULT NURSE REASSESSMENT NOTE - NS ED NURSE REASSESS COMMENT FT1
Assumed care of the patient at 0730. Verbal report received from Joan DEAN Obs. Patient A&Ox4. No s/s of acute distress or pain. Patient pending SW for irena placement. PIV patent. Frequent T&P. Patient in understanding of plan of care. Patient with no further questions for the RN. Resting in comfort. Call bell within reach and encouraged to use when assistance needed. Will continue to monitor. Assumed care of the patient at 0730. Verbal report received from Joan DEAN Obs. Patient A&Ox4. No s/s of acute distress or pain. Patient pending SW for irena placement. PIV patent. Patient noted with stage I to sacral area with surrounding ecchymosis. Perineal care provided, Barrier cream applied, Frequent T&P. Patient in understanding of plan of care. Patient with no further questions for the RN. Resting in comfort. Call bell within reach and encouraged to use when assistance needed. Will continue to monitor. Assumed care of the patient at 0730. Verbal report received from Joan DEAN Obs. Patient A&Ox4. No s/s of acute distress or pain. Patient pending SW for irena placement. PIV patent. Patient noted with stage I to sacral area with surrounding ecchymosis. Left skin tear, covered with dcd and tegaderm. Perineal care provided, Barrier cream applied, Frequent T&P. Patient in understanding of plan of care. Patient with no further questions for the RN. Resting in comfort. Call bell within reach and encouraged to use when assistance needed. Will continue to monitor.

## 2022-12-14 NOTE — ED STATDOCS - LOCATION
OFFICE VISIT    Patient: Maia Gabriel   : 1939 MRN: [id number]    SUBJECTIVE:  Chief Complaint   Patient presents with   • Follow-up     6 month follow up        Patient has given consent to record this visit for documentation in their clinical record.    A 83 year old female presents for a follow-up of multiple medical conditions.      HISTORY OF PRESENT ILLNESS:  Historian: Self accompanied by daughter.     Need for immunization against influenza: Due.     Stage III chronic kidney disease: On medication. Recent labs showed kidney function at 1.2 with stable GFR.     Chronic respiratory failure with hypoxia: Uses oxygen as needed. Is able to wear mask now.     Essential hypertension, benign: BP measured this day was 118/77 mmHg. Was 136/77 mmHg in last visit and 150/70 mmHg in previous visit.       Hyperlipidemia: On medication. Recent labs showed normal cholesterol values with total cholesterol at 172, triglycerides at 61, HDL at 86, and LDL at 74; mildly elevated alkaline phosphatase at 120 which is trending down and used to be 130 where 117 is normal.     Severe obesity (BMI 35.0-39.9) with comorbidity: Has lost weight recently.     Additional comments  Diabetes: Recent labs showed HbA1c in normal range which has been in the range of 5.7 since 2019 and urine test showed mild protein at 30.7 which is trending down when compared to 2020 at 100 where cut off is 30. Has not consulted with podiatrist and ophthalmologist.       Hypothyroidism: On medication. Recent labs showed normal thyroid function at 2.6, normal vitamin D at 50s, and elevated vitamin B12. Has stopped taking vitamin B12 supplements.      Gout: On medication. Complaints occasional pain in joints with fatigue and gas issues due to medication. Inquires on change of timing of medication. Recent labs showed uric acid levels at 6.3 where 6.5 in normal. Has no issues with sleep.  Asks if she can do 100 mg twice daily instead of 200 mg once a day of  allopurinol    History GI bleed: Has completed the 3 months course of iron supplements from hematologist and no longer seeing them. Recent labs showed hemoglobin at 13.8. Denies of black stools.     Advance care planning: Has not made any. Informs of 'no extreme means unless I am going to get better'. Daughter informs that she is her medical power of .     Patient's past medical history, personal history, family history, social history as well as allergy and medications were reviewed and updated accordingly.    PAST MEDICAL HISTORY:  Past Medical History:   Diagnosis Date   • Anemia    • COPD (chronic obstructive pulmonary disease) (CMS/[facility])    • Diabetes mellitus (CMS/[facility])    • Essential (primary) hypertension    • Gout    • High cholesterol    • Obese      MEDICATIONS:  Current Outpatient Medications   Medication Sig Dispense Refill   • levothyroxine 100 MCG tablet Take 1 tablet by mouth daily. 90 tablet 3   • triamterene-hydroCHLOROthiazide (MAXZIDE-25) 37.5-25 MG per tablet Take 1 tablet by mouth daily. 90 tablet 3   • metoPROLOL succinate (TOPROL-XL) 100 MG 24 hr tablet Take 1 tablet by mouth daily. 90 tablet 3   • irbesartan (AVAPRO) 150 MG tablet Take 1 tablet by mouth daily. 90 tablet 3   • allopurinol (ZYLOPRIM) 100 MG tablet Take 2 tablets by mouth daily. 180 tablet 3   • lovastatin (MEVACOR) 40 MG tablet Take 1 tablet by mouth nightly. 90 tablet 3   • psyllium (METAMUCIL MULTIHEALTH FIBER) 58.12 % packet for oral suspension Take 1 packet by mouth daily.     • omeprazole (PriLOSEC) 40 MG capsule Take 1 capsule by mouth daily. 30 capsule 1   • Omega-3 Fatty Acids (Fish Oil) 1000 MG capsule Take 1 g by mouth 2 (two) times a day.     • oxygen (O2) gas Inhale 3 L/min into the lungs daily as needed (Sob).      • Multiple Vitamins-Minerals (CENTRUM SILVER) tablet      • Coenzyme Q10 (COQ-10) 10 MG Cap Take 20 mg by mouth daily.        No current facility-administered medications for this visit.      ALLERGIES:  Allergies as of 2022   • (No Known Allergies)     FAMILY HISTORY:  Family History   Problem Relation Age of Onset   • Hypertension Mother    • Stroke Mother         7 of them   • Alcohol Abuse Father    • Stroke Brother         2 brothers     SOCIAL HISTORY:  Social History     Tobacco Use   • Smoking status: Former     Types: Cigarettes     Quit date: 10/1/2009     Years since quittin.2   • Smokeless tobacco: Never   Vaping Use   • Vaping Use: never used   Substance Use Topics   • Alcohol use: Not Currently   • Drug use: Never     Past Surgical HISTORY  Past Surgical History:   Procedure Laterality Date   • Colonoscopy     • Egd     • No past surgeries     • Tonsillectomy N/A 1945       REVIEW OF SYSTEMS:    Eyes: As Per HPI.  Respiratory: As Per HPI.  Gastrointestinal: As Per HPI.  Musculoskeletal: As Per HPI.  Psychiatric/Behavioral: As Per HPI.    OBJECTIVE:  Visit Vitals  /77   Pulse 85   Resp 16   SpO2 95%       PHYSICAL EXAM:    Constitutional: Alert, in no acute distress and current vital signs reviewed. Patient with morbid obesity.  Daughter present in the room throughout the visit  Head and Face: Atraumatic and normocephalic.   Eyes: No discharge, no eyelid swelling and the sclerae were normal.   ENT: Oropharynx normal. Normal appearing outer ear. Little wax in one ear. Tympanic membranes are bilaterally clear, normal appearing nose and normal lips.   Neck: Normal appearing neck and supple neck. Multiple pigmented skin tags on neck noted. No carotid bruit.   Pulmonary: Breath sounds clear to auscultation bilaterally, but no respiratory distress and normal respiratory rate and effort.   Cardiovascular: Normal rate, regular rhythm, normal S1, normal S2 and edema was not present in the lower extremities with normal pulses.   Abdomen: Soft and nontender. Morbidly obese abdomen.   Skin, Hair, Nails: Normal skin color and pigmentation.    Psychiatric: Alert and awake,  interactive and mood/affect were appropriate.    DIAGNOSTIC STUDIES:  LAB RESULTS:  Lab Services on 12/08/2022   Component Date Value Ref Range Status   • Hemoglobin A1C 12/08/2022 5.6  4.5 - 5.6 % Final   • Fasting Status 12/08/2022 10  0 - 999 Hours Final   • Sodium 12/08/2022 142  135 - 145 mmol/L Final   • Potassium 12/08/2022 3.9  3.4 - 5.1 mmol/L Final   • Chloride 12/08/2022 107  97 - 110 mmol/L Final   • Carbon Dioxide 12/08/2022 28  21 - 32 mmol/L Final   • Anion Gap 12/08/2022 11  7 - 19 mmol/L Final   • Glucose 12/08/2022 93  70 - 99 mg/dL Final   • BUN 12/08/2022 46 (A)  6 - 20 mg/dL Final   • Creatinine 12/08/2022 1.28 (A)  0.51 - 0.95 mg/dL Final   • Glomerular Filtration Rate 12/08/2022 42 (A)  >=60 Final   • BUN/ Creatinine Ratio 12/08/2022 36 (A)  7 - 25 Final   • Calcium 12/08/2022 9.9  8.4 - 10.2 mg/dL Final   • Bilirubin, Total 12/08/2022 0.4  0.2 - 1.0 mg/dL Final   • GOT/AST 12/08/2022 17  <=37 Units/L Final   • GPT/ALT 12/08/2022 22  <64 Units/L Final   • Alkaline Phosphatase 12/08/2022 120 (A)  45 - 117 Units/L Final   • Albumin 12/08/2022 3.8  3.6 - 5.1 g/dL Final   • Protein, Total 12/08/2022 7.0  6.4 - 8.2 g/dL Final   • Globulin 12/08/2022 3.2  2.0 - 4.0 g/dL Final   • A/G Ratio 12/08/2022 1.2  1.0 - 2.4 Final   • Cholesterol 12/08/2022 172  <=199 mg/dL Final   • Triglycerides 12/08/2022 61  <=149 mg/dL Final   • HDL 12/08/2022 86  >=50 mg/dL Final   • LDL 12/08/2022 74  <=129 mg/dL Final   • Non-HDL Cholesterol 12/08/2022 86  mg/dL Final   • Cholesterol/ HDL Ratio 12/08/2022 2.0  <=4.4 Final   • TSH 12/08/2022 2.647  0.350 - 5.000 mcUnits/mL Final   • Microalbumin, Urine 12/08/2022 <0.50  mg/dL Final   • Creatinine, Urine 12/08/2022 65.60  mg/dL Final   • Microalbumin/ Creatinine Ratio 12/08/2022    Final   • Vitamin B12 12/08/2022 1,223 (A)  211 - 911 pg/mL Final   • Vitamin D, 25-Hydroxy 12/08/2022 48.9  30.0 - 100.0 ng/mL Final   • Uric Acid 12/08/2022 6.3 (A)  2.6 - 5.9 mg/dL Final        ASSESSMENT AND PLAN:  This 83 year old female presents with :    Problem List Items Addressed This Visit        Advance Directives and General Issues     ACP (advance care planning)       Cardiac and Vasculature    Essential hypertension, benign    Hyperlipidemia       Endocrine and Metabolic    Iodine hypothyroidism    Abnormal glucose    Severe obesity (BMI 35.0-39.9) with comorbidity (CMS/HCC)       Gastrointestinal and Abdominal    Acute GI bleeding       Genitourinary and Reproductive    Stage III chronic kidney disease (CMS/HCC)       Musculoskeletal and Injuries    Gout, arthritis       Pulmonary and Pneumonias    Chronic respiratory failure with hypoxia (CMS/HCC) - Primary   Other Visit Diagnoses     Need for immunization against influenza        Relevant Orders    INFLUENZA QUADRIVALENT HIGH DOSE PRES FREE 0.7 ML VACC,IM (Completed)            PLAN:    Chronic respiratory failure with hypoxia (CMS/HCC)  Continue current regimen  Patient is more open to using oxygen now.    Need for immunization against influenza      Vaccination recommendation discussed.   Influenza vaccine administered today.   Ordered COVID-19 booster.  She received it today in the office  Recommended shingles vaccine. Side effect mentioned including pain and flu like symptoms. Patient will think of it.       Stage III chronic kidney disease (CMS/HCC)  So far doing okay.   Labs reviewed.   Continue current medications.       Essential hypertension, benign  Well controlled.  BP logs reviewed.   Attributed BP variations to diet and sleep.      Hyperlipidemia  So far okay.  Labs reviewed.  Continue current medications.      Severe obesity (BMI 35.0-39.9) with comorbidity (CMS/HCC)  Has lost weight.  Advised to continue to slowly work on it by increasing activity and monitoring diet      Prediabetes  Labs reviewed.   Improved from last time  Advised to consult ophthalmologist. Patient will schedule it.     Hypothyroidism  Labs reviewed.    Continue current medications.     Gout  Labs reviewed.   Advised to take allopurinol one pill in afternoon and one pill in the evening.     History GI bleed  So far hemoglobin is stable.   Labs reviewed.  We will continue to monitor her labs intermittently  Advised to call if any new or worsening symptoms.     Advance care planning  Extensive discussion in terms of advance care done.  Different options with CPR as well as no CPR, comfort measures as well as limited measures with IV antibiotics and BiPAP and CPAP discussed in detail.  G-tube measures also discussed in detail with patient as well as Dr. Palm patient will think about it and message me to complete the procedure.   Total time spent on advance care discussion was atleast 12 minutes.     Advised to get labs done in 6 months before next appointment.     Follow up with me in 6 months.  If she decides to get her advance care planning done to DO NOT RESUSCITATE status, she will contact us and we can go ahead with POLST orders    Refer to orders.  Medical compliance with plan discussed and risks of non-compliance reviewed.  Patient education completed on disease process, etiology & prognosis.  Proper usage and side effects of medications reviewed & discussed.  Return to clinic as clinically indicated as discussed with the patient who verbalized understanding of the plan and is in agreement with the plan.    I,  Dr. Maya Cotton, have created a visit summary document based on the audio recording between Dr. Nina Santana MD and this patient for the physician to review, edit as needed, and authenticate.    Creation Date: 12/13/2022       I have reviewed and edited the visit summary above and attest that it is accurate.    Electronically signed by:  Nina Santana MD  [facility] [address] [address]  Dept Phone: [phone]      Right hip

## 2022-12-19 NOTE — PHYSICAL EXAM
[Poor Appearance] : well-appearing [Acute Distress] : not in acute distress [de-identified] : CONSTITUTIONAL: Patient is a very pleasant individual who is well-nourished and appears stated age. \par PSYCHIATRIC: Alert and oriented times three and in no apparent distress, and participates with orthopedic evaluation well.\par HEAD: Atraumatic and nonsyndromic in appearance.\par EENT: No thyromegaly, EOMI.\par RESPIRATORY: Respiratory rate is regular, not dyspneic on examination.\par LYMPHATICS: There is no cervical or axillary lymphadenopathy.\par INTEGUMENTARY: Skin is clean, dry, and intact about the bilateral upper extremities and cervical spine. \par VASCULAR: There is brisk capillary refill about the bilateral upper extremities and radial pulses are 2/4. \par NEUROLOGIC: Negative L'hirmitte, negative Spurling’s sign. There are no pathologic reflexes. There is no decrease in sensation of the bilateral upper extremities on Wartenberg pinwheel examination. Deep tendon reflexes are well-maintained at +2/4 of the bilateral upper extremities and are symmetric.\par MUSCULOSKELETAL: Manual motor strength is well maintained in the bilateral upper extremities. Cervical range of motion is well maintained. Normal secondary orthopaedic exam of bilateral shoulders, elbows and hands. Elbow flexion and extension, wrist extension, finger flexion and abduction are well maintained. \par \par B/L Intrinsic hand muscular waisting. he ambulates with a rolling walker, intermittent use of a wheelchair. [de-identified] : X-ray of the cervical spine taken today shows s/p anterior and posterior cervical fusion. Use Enhanced Medication Counseling?: No

## 2023-02-16 NOTE — ED CDU PROVIDER INITIAL DAY NOTE - OBJECTIVE STATEMENT
Pt s/p fall onto R buttock.  X-rays neg, but pt with difficulty ambulating in ED and will require PT eval this AM Detail Level: Simple

## 2023-07-05 NOTE — H&P PST ADULT - BP NONINVASIVE DIASTOLIC (MM HG)
She can hold off on taking the medication but she will need to follow-up with see me in 2 weeks to make sure that she needs to stay off   53

## 2023-07-10 NOTE — PHYSICAL THERAPY INITIAL EVALUATION ADULT - GAIT PATTERN USED, PT EVAL
Contacted pt to reschedule appointment since Dr. Juan will no longer be seeing patients at the [facility] location.  Left voice message to contact clinic at [phone].  
3-point gait

## 2023-07-29 NOTE — ED PROVIDER NOTE - IV ALTEPLASE EXCL ABS HIDDEN
A/P:  Overall stable.  Main issue is keeping wound vac functioning and wound manager in place and sealing up leaks as they arise.  Last night had several that were patched successfully.  This AM were several small ones that I dealt with.  No leakage from the edges or through the central portion over open wound.  Outputs seem reasonable.  Patient is not taking much po which is good idea presently.  Afeb/ VSS.  On same antibioitics with stable WBC.  No evidence of new issues.  I/O's are ok  Follow wound vac function and patch leaks as they arise.  Limited po.    TPN needed, to continue.   show

## 2023-10-17 NOTE — ED ADULT NURSE NOTE - INCIDENT LOCATION
Pt reports taking all medications as prescribed. Reports BPs at home have been at the highest 165/110 and lowest 150/83. Pt BP in clinic today WNL.    Pt stated her baby is home from NICU.  has an appointment with the pediatrician today. Pt is still breast and bottle feeding baby.   home

## 2023-10-25 NOTE — PHYSICAL THERAPY INITIAL EVALUATION ADULT - MD ORDER
Ruba Vargas  1963  male  62 y.o. SUBJECTIVE:       Chief Complaint   Patient presents with    New Patient    Medication Refill     Celexa , cpap supplies or referral to get more  from [facility]    Arm Pain     Right arm     Procedure      heart valve repair paratstesia in right thigh under inscision for the bypass    Irregular Heart Beat    Cough     Month since surgery       HPI:  Patient wants to establish with me. He recently moved from [facility] to [place]. Patient is a status post mitral valve repair by the right groin approach/cut. Since then patient get occasional numbness in the area below the cath site. He denies any significant pain, loss of muscle power weakness affecting right lower extremity.     History of anxiety mostly generalized anxiety disorder in the past stable with citalopram.    Past Medical History:   Diagnosis Date    Mitral valve disorder     Sleep apnea      Past Surgical History:   Procedure Laterality Date    MITRAL VALVE REPAIR  2023    PILONIDAL CYST EXCISION       Social History     Socioeconomic History    Marital status:      Spouse name: Cristin    Number of children: 2    Years of education: 16    Highest education level: None   Tobacco Use    Smoking status: Former     Packs/day: 3.00     Years: 4.00     Additional pack years: 0.00     Total pack years: 12.00     Types: Cigarettes     Quit date: 1984     Years since quittin.3    Smokeless tobacco: Never   Vaping Use    Vaping Use: Never used   Substance and Sexual Activity    Alcohol use: Not Currently    Drug use: Never    Sexual activity: Never     Social Determinants of Health     Physical Activity: Sufficiently Active (10/24/2023)    Exercise Vital Sign     Days of Exercise per Week: 4 days     Minutes of Exercise per Session: 50 min   Intimate Partner Violence: Not At Risk (10/24/2023)    Humiliation, Afraid, Rape, and Kick questionnaire     Fear of Current or Ex-Partner: No Received and reviewed. PT eval and treat. WBAT, cervical collar encouraged. No lifting greater than 5 pounds.

## 2024-04-21 NOTE — PHYSICAL THERAPY INITIAL EVALUATION ADULT - LEVEL OF INDEPENDENCE: GAIT, REHAB EVAL
Subjective   Patient ID: Shree Horta is a 88 y.o. male who presents for follow-up after the patient's visit to the emergency department April 15, 2024 secondary to urinary symptoms; constipation    HPI   Since discharge from the emergency department, the patient reports no recurrent constipation.  The patient and his wife report continued chronic urinary urgency-which has been back to baseline since the patient completed his 7-day course of nitrofurantoin.  They report no urinary frequency, dysuria, hematuria, weak stream, interruption of stream, postvoid dribbling.  Review of Systems    Objective   There were no vitals taken for this visit.    Physical Exam  Abdomen-soft, nondistended.  Normal active bowel sounds.  There is a 12 x 4 cm mass in the epigastrium.  Palpation revealed no tenderness or increase in warmth.  No tenderness or masses noted throughout the rest of the abdomen  Assessment/Plan        Assessment  Increased urinary urgency, urinary frequency, hematuria-possible etiologies include uncomplicated urinary tract infection, chronic nonbacterial prostatitis/chronic pelvic pain syndrome-inflammatory versus noninflammatory.  Constipation-unsure of etiology.  Plan  Obtain urinalysis today.  For now I told the patient to continue his current medication regimen including the trospium 20 mg p.o. twice daily.  
contact guard

## 2025-03-29 NOTE — ED ADULT TRIAGE NOTE - HISTORY OF COVID-19 VACCINATION
Discharge instructions reviewed with patient. IV removed without complications. Patient is alert and oriented at discharge and in no acute distress.    
Vaccine status unknown

## 2025-05-15 NOTE — H&P PST ADULT - MARITAL STATUS
Add Aldactone 12.5mg PO daily  Resume Lopressor 25mg PO twice daily  Continue all other cardiac meds the same  DAPT with aspirin/Plavix, amlodipine, Jardiance, Lasix, losartan and Crestor  Refer to general surgery for evaluation of anemia/rectal bleeding with history of hemorrhoids  Please follow-up with PCP regarding anemia as well as elevated TSH as Synthroid dose may need adjusted  Please call pulmonology office to reschedule sleep study which was previously scheduled for 5/19/2025 but apparently canceled reportedly due to insurance denial  Recommend low-sodium diet under 2000 mg/day  Please limit processed foods such as canned soups, canned vegetables, frozen TV dinners or processed meats  Follow-up in CHF clinic on 5/20/2025 as scheduled  Follow-up with Dr. Sargent on 7/23/2025 as scheduled    -Weigh yourself daily    -Stay Hydrated, 64 oz fluid daily    -Diet should be sodium restricted to 2 grams    -Again watch your daily weight trends and if you gain water weight please follow below instructions.    -If you gain 3-5 pounds in 2-3 days OR notice that you are retaining fluid in anyway just like you did before then take an extra dose of your water pill (furosemide/Lasix) every day until you lose the weight or feel better.     -If you notice that you have taken more than 2 extra doses in 1 week then please call and let us know.    -If at any time you feel that you are retaining fluid, your medications are not working, or you feel ill in anyway, then please call us for either same day appointment or the next day, and for instructions. Our goal is to keep you out of the emergency room and the hospital and we have ways to do it. You just need to call us in a timely manner.     -If you become sick for other reasons, and notice that you are not urinating as much, the urine is very dark, you have significant diarrhea or vomiting, then please DO NOT take your water pill and CALL US immediately.    
Single
